# Patient Record
Sex: FEMALE | Race: BLACK OR AFRICAN AMERICAN | Employment: FULL TIME | URBAN - METROPOLITAN AREA
[De-identification: names, ages, dates, MRNs, and addresses within clinical notes are randomized per-mention and may not be internally consistent; named-entity substitution may affect disease eponyms.]

---

## 2017-01-19 DIAGNOSIS — Z30.011 ENCOUNTER FOR INITIAL PRESCRIPTION OF CONTRACEPTIVE PILLS: Primary | ICD-10-CM

## 2017-01-19 NOTE — TELEPHONE ENCOUNTER
Ortho Tri-Cycl      Last Written Prescription Date: 01/22/16  Last Fill Quantity: 28,  # refills: 11   Last Office Visit with [facility], [facility] or [facility] prescribing provider: 11/29/16

## 2017-01-20 ENCOUNTER — TELEPHONE (OUTPATIENT)
Dept: FAMILY MEDICINE | Facility: CLINIC | Age: 22
End: 2017-01-20

## 2017-01-20 RX ORDER — NORGESTIMATE AND ETHINYL ESTRADIOL 7DAYSX3 28
1 KIT ORAL DAILY
Qty: 28 TABLET | Refills: 9 | Status: SHIPPED | OUTPATIENT
Start: 2017-01-20 | End: 2017-11-29

## 2017-01-20 NOTE — TELEPHONE ENCOUNTER
Prescription approved per [facility] Refill Protocol.    Selena Lindsey, PharmD  [facility]  On behalf of [facility]

## 2017-03-08 ENCOUNTER — OFFICE VISIT (OUTPATIENT)
Dept: FAMILY MEDICINE | Facility: CLINIC | Age: 22
End: 2017-03-08
Payer: COMMERCIAL

## 2017-03-08 VITALS
DIASTOLIC BLOOD PRESSURE: 66 MMHG | SYSTOLIC BLOOD PRESSURE: 112 MMHG | WEIGHT: 161 LBS | TEMPERATURE: 98.1 F | HEART RATE: 81 BPM | BODY MASS INDEX: 29.63 KG/M2 | HEIGHT: 62 IN

## 2017-03-08 DIAGNOSIS — R07.0 THROAT PAIN: Primary | ICD-10-CM

## 2017-03-08 LAB
DEPRECATED S PYO AG THROAT QL EIA: NORMAL
HETEROPH AB SER QL: NEGATIVE
MICRO REPORT STATUS: NORMAL
SPECIMEN SOURCE: NORMAL

## 2017-03-08 PROCEDURE — 36415 COLL VENOUS BLD VENIPUNCTURE: CPT | Performed by: NURSE PRACTITIONER

## 2017-03-08 PROCEDURE — 87880 STREP A ASSAY W/OPTIC: CPT | Performed by: NURSE PRACTITIONER

## 2017-03-08 PROCEDURE — 86308 HETEROPHILE ANTIBODY SCREEN: CPT | Performed by: NURSE PRACTITIONER

## 2017-03-08 PROCEDURE — 99213 OFFICE O/P EST LOW 20 MIN: CPT | Performed by: NURSE PRACTITIONER

## 2017-03-08 PROCEDURE — 87081 CULTURE SCREEN ONLY: CPT | Performed by: NURSE PRACTITIONER

## 2017-03-08 NOTE — PATIENT INSTRUCTIONS
Gargle with salt water as needed for throat pain  Cepacol lozenges as needed for pain every 3 hrs  Tylenol 500 mg 4 times daily as needed   Rapid strep test negative     Mono test if also negative recommend to see ENT doctor     Thank you for choosing [facility].  You may be receiving a survey in the mail from [employer]aut[employer] regarding your visit today.  Please take a few minutes to complete and return the survey to let us know how we are doing.      If you have questions or concerns, please contact us via [employer] or you can contact your care team at [phone].    Our Clinic hours are:  Monday 6:40 am  to 7:00 pm  Tuesday -Friday 6:40 am to 5:00 pm    The [place] outpatient lab hours are:  Monday - Friday 6:10 am to 4:45 pm  Saturdays 7:00 am to 11:00 am  Appointments are required, call [phone]    If you have clinical questions after hours or would like to schedule an appointment,  call the clinic at [phone].

## 2017-03-08 NOTE — MR AVS SNAPSHOT
After Visit Summary   3/8/2017    Madelyn Quintnaa    MRN: [id number]           Patient Information     Date Of Birth          1995        Visit Information        Provider Department      3/8/2017 11:00 AM Ayah Goldstein APRN CNP [facility]        Today's Diagnoses     Throat pain    -  1      Care Instructions    Gargle with salt water as needed for throat pain  Cepacol lozenges as needed for pain every 3 hrs  Tylenol 500 mg 4 times daily as needed   Rapid strep test     Mono test if also negative recommend to see ENT doctor     Thank you for choosing [facility].  You may be receiving a survey in the mail from [employer] regarding your visit today.  Please take a few minutes to complete and return the survey to let us know how we are doing.      If you have questions or concerns, please contact us via [employer] or you can contact your care team at [phone].    Our Clinic hours are:  Monday 6:40 am  to 7:00 pm  Tuesday -Friday 6:40 am to 5:00 pm    The [place] outpatient lab hours are:  Monday - Friday 6:10 am to 4:45 pm  Saturdays 7:00 am to 11:00 am  Appointments are required, call [phone]    If you have clinical questions after hours or would like to schedule an appointment,  call the clinic at [phone].          Follow-ups after your visit        Who to contact     If you have questions or need follow up information about today's clinic visit or your schedule please contact [facility] directly at [phone].  Normal or non-critical lab and imaging results will be communicated to you by [employer], letter or phone within 4 business days after the clinic has received the results. If you do not hear from us within 7 days, please contact the clinic through [employer] or phone. If you have a critical or abnormal lab result, we will notify you by phone as soon as possible.  Submit refill requests through [employer] or call your pharmacy and they will  "forward the refill request to us. Please allow 3 business days for your refill to be completed.          Additional Information About Your Visit        [employer] Information     [email or web address] gives you secure access to your electronic health record. If you see a primary care provider, you can also send messages to your care team and make appointments. If you have questions, please call your primary care clinic.  If you do not have a primary care provider, please call [phone] and they will assist you.        Care EveryWhere ID     This is your Care EveryWhere ID. This could be used by other organizations to access your [place] medical records  [id number]        Your Vitals Were     Pulse Temperature Height BMI (Body Mass Index)          81 98.1  F (36.7  C) (Oral) 5' 2\" (1.575 m) 29.45 kg/m2         Blood Pressure from Last 3 Encounters:   03/08/17 112/66   11/29/16 114/56   11/26/16 105/58    Weight from Last 3 Encounters:   03/08/17 161 lb (73 kg)   11/29/16 154 lb (69.9 kg)   11/26/16 157 lb 9.6 oz (71.5 kg)              We Performed the Following     Mononucleosis screen     Rapid strep screen        Primary Care Provider Office Phone #    [facility] [phone]       No address on file        Thank you!     Thank you for choosing [facility]  for your care. Our goal is always to provide you with excellent care. Hearing back from our patients is one way we can continue to improve our services. Please take a few minutes to complete the written survey that you may receive in the mail after your visit with us. Thank you!             Your Updated Medication List - Protect others around you: Learn how to safely use, store and throw away your medicines at www.disposemymeds.org.          This list is accurate as of: 3/8/17 11:34 AM.  Always use your most recent med list.                   Brand Name Dispense Instructions for use    albuterol 108 (90 BASE) MCG/ACT Inhaler    PROAIR " HFA/PROVENTIL HFA/VENTOLIN HFA    1 Inhaler    Inhale 2 puffs into the lungs every 6 hours as needed for shortness of breath / dyspnea or wheezing       fluocinonide 0.05 % cream    LIDEX    60 g    Apply sparingly to affected area twice daily as needed.  Do not apply to face.       hydrOXYzine 50 MG capsule    VISTARIL    60 capsule    Take 1-2 capsules ( mg) at bedtime as needed for sleep.       LORazepam 1 MG tablet    ATIVAN    20 tablet    Take 1/2 - 1 tab as needed for panic up to 3 times a week PRN       norgestim-eth estrad triphasic 0.18/0.215/0.25 MG-35 MCG per tablet    ORTHO TRI-CYCLEN (28)    28 tablet    Take 1 tablet by mouth daily       sertraline 100 MG tablet    ZOLOFT    135 tablet    Take 1.5 tablets (150 mg) by mouth daily          Alert and oriented to person, place and time, memory intact, behavior appropriate to situation, PERRL.

## 2017-03-08 NOTE — PROGRESS NOTES
"  SUBJECTIVE:                                                    Madelyn Quintana is a 21 year old female who presents to clinic today for the following health issues:throat pain, bilateral ear pain and occasional dry cough for almost a month.  Denies fever, chills, shortness of breath , abdominal pain and nausea.     ENT Symptoms             Symptoms: cc Present Absent Comment   Fever/Chills   x Hasn't checked   Fatigue  x     Muscle Aches  x  Little bit   Eye Irritation       Sneezing   x    Nasal Андрей/Drg   x    Sinus Pressure/Pain   x    Loss of smell   x    Dental pain       Sore Throat x x     Swollen Glands x x     Ear Pain/Fullness  x     Cough  x     Wheeze   x    Chest Pain   x    Shortness of breath   x    Rash       Other         Symptom duration:  4 weeks   Symptom severity:  moderate   Treatments tried:  tylenol   Contacts:  unknown     Problem list and histories reviewed & adjusted, as indicated.  Additional history: as documented    Labs reviewed in EPIC    Reviewed and updated as needed this visit by clinical staff  Tobacco  Allergies  Meds       Reviewed and updated as needed this visit by Provider         ROS:  Constitutional, HEENT, cardiovascular, pulmonary, gi and gu systems are negative, except as otherwise noted.    OBJECTIVE:                                                    /66 (BP Location: Left arm, Cuff Size: Adult Regular)  Pulse 81  Temp 98.1  F (36.7  C) (Oral)  Ht 5' 2\" (1.575 m)  Wt 161 lb (73 kg)  BMI 29.45 kg/m2  Body mass index is 29.45 kg/(m^2).  GENERAL: healthy, alert and no distress  HENT: ear canals and TM's normal, oropharynx clear, oral mucous membranes moist and sinuses: not tender  NECK: no adenopathy, no asymmetry, masses, or scars and thyroid normal to palpation  RESP: lungs clear to auscultation - no rales, rhonchi or wheezes  CV: regular rate and rhythm, normal S1 S2, no S3 or S4, no murmur, click or rub, no peripheral edema and peripheral pulses " strong    Diagnostic Test Results:  Strep screen - Negative  Mono -  Negative     ASSESSMENT/PLAN:                                                      1. Throat pain    - Rapid strep screen  - Mononucleosis screen  - Beta strep group A culture  - OTOLARYNGOLOGY REFERRAL  -symptomatic therapy recommended, see AVS     See Patient Instructions    NATHANAEL Alvarez [facility]

## 2017-03-08 NOTE — NURSING NOTE
"Chief Complaint   Patient presents with     Pharyngitis     sore throat for 4 weeks, hard to swallow;      Patient Request     referral to allergy for food testing.       Initial /66 (BP Location: Left arm, Cuff Size: Adult Regular)  Pulse 81  Temp 98.1  F (36.7  C) (Oral)  Ht 5' 2\" (1.575 m)  Wt 161 lb (73 kg)  BMI 29.45 kg/m2 Estimated body mass index is 29.45 kg/(m^2) as calculated from the following:    Height as of this encounter: 5' 2\" (1.575 m).    Weight as of this encounter: 161 lb (73 kg).  Medication Reconciliation: complete  "

## 2017-03-10 LAB
BACTERIA SPEC CULT: NORMAL
MICRO REPORT STATUS: NORMAL
SPECIMEN SOURCE: NORMAL

## 2017-04-18 DIAGNOSIS — G47.9 SLEEP DISTURBANCE: ICD-10-CM

## 2017-04-18 DIAGNOSIS — F41.1 GENERALIZED ANXIETY DISORDER: ICD-10-CM

## 2017-04-18 RX ORDER — HYDROXYZINE PAMOATE 50 MG/1
CAPSULE ORAL
Qty: 60 CAPSULE | Refills: 3 | Status: SHIPPED | OUTPATIENT
Start: 2017-04-18 | End: 2018-01-22

## 2017-04-18 NOTE — TELEPHONE ENCOUNTER
Routing refill request to provider for review/approval because:  Drug not on the [facility] refill protocol for this indication (anxiety)    Selena Lindsey, PharmD  [facility]  On behalf of [facility]

## 2017-04-18 NOTE — TELEPHONE ENCOUNTER
Hydroxyzine 50 pamoate      Last Written Prescription Date: 8/4/16  Last Fill Quantity: 60,  # refills: 3   Last Office Visit with [facility], [facility] or [facility] prescribing provider: 3/8/17      Thank You!  Jammie Da Silva  [facility]  P: [phone] F:[phone]

## 2017-08-15 ENCOUNTER — OFFICE VISIT (OUTPATIENT)
Dept: FAMILY MEDICINE | Facility: CLINIC | Age: 22
End: 2017-08-15
Payer: COMMERCIAL

## 2017-08-15 VITALS
TEMPERATURE: 96.8 F | DIASTOLIC BLOOD PRESSURE: 64 MMHG | SYSTOLIC BLOOD PRESSURE: 110 MMHG | BODY MASS INDEX: 28.35 KG/M2 | RESPIRATION RATE: 18 BRPM | WEIGHT: 155 LBS | HEART RATE: 80 BPM

## 2017-08-15 DIAGNOSIS — R11.2 NON-INTRACTABLE VOMITING WITH NAUSEA, UNSPECIFIED VOMITING TYPE: ICD-10-CM

## 2017-08-15 DIAGNOSIS — R39.9 UTI SYMPTOMS: ICD-10-CM

## 2017-08-15 DIAGNOSIS — F33.0 MAJOR DEPRESSIVE DISORDER, RECURRENT EPISODE, MILD (H): ICD-10-CM

## 2017-08-15 DIAGNOSIS — N76.0 BV (BACTERIAL VAGINOSIS): ICD-10-CM

## 2017-08-15 DIAGNOSIS — Z11.3 SCREEN FOR STD (SEXUALLY TRANSMITTED DISEASE): ICD-10-CM

## 2017-08-15 DIAGNOSIS — B96.89 BV (BACTERIAL VAGINOSIS): ICD-10-CM

## 2017-08-15 DIAGNOSIS — Z01.411 ENCOUNTER FOR GYNECOLOGICAL EXAMINATION WITH ABNORMAL FINDING: Primary | ICD-10-CM

## 2017-08-15 LAB
ALBUMIN UR-MCNC: 30 MG/DL
APPEARANCE UR: CLEAR
BACTERIA #/AREA URNS HPF: ABNORMAL /HPF
BETA HCG QUAL IFA URINE: NEGATIVE
BILIRUB UR QL STRIP: NEGATIVE
COLOR UR AUTO: YELLOW
GLUCOSE UR STRIP-MCNC: NEGATIVE MG/DL
HGB UR QL STRIP: NEGATIVE
KETONES UR STRIP-MCNC: NEGATIVE MG/DL
LEUKOCYTE ESTERASE UR QL STRIP: ABNORMAL
MICRO REPORT STATUS: ABNORMAL
MUCOUS THREADS #/AREA URNS LPF: PRESENT /LPF
NITRATE UR QL: NEGATIVE
NON-SQ EPI CELLS #/AREA URNS LPF: ABNORMAL /LPF
PH UR STRIP: 6 PH (ref 5–7)
RBC #/AREA URNS AUTO: ABNORMAL /HPF (ref 0–2)
SP GR UR STRIP: >1.03 (ref 1–1.03)
SPECIMEN SOURCE: ABNORMAL
URN SPEC COLLECT METH UR: ABNORMAL
UROBILINOGEN UR STRIP-ACNC: 0.2 EU/DL (ref 0.2–1)
WBC #/AREA URNS AUTO: ABNORMAL /HPF (ref 0–2)
WET PREP SPEC: ABNORMAL

## 2017-08-15 PROCEDURE — 87491 CHLMYD TRACH DNA AMP PROBE: CPT | Performed by: NURSE PRACTITIONER

## 2017-08-15 PROCEDURE — 84703 CHORIONIC GONADOTROPIN ASSAY: CPT | Performed by: NURSE PRACTITIONER

## 2017-08-15 PROCEDURE — 87210 SMEAR WET MOUNT SALINE/INK: CPT | Performed by: NURSE PRACTITIONER

## 2017-08-15 PROCEDURE — 99213 OFFICE O/P EST LOW 20 MIN: CPT | Performed by: NURSE PRACTITIONER

## 2017-08-15 PROCEDURE — 81001 URINALYSIS AUTO W/SCOPE: CPT | Performed by: NURSE PRACTITIONER

## 2017-08-15 PROCEDURE — G0145 SCR C/V CYTO,THINLAYER,RESCR: HCPCS | Performed by: NURSE PRACTITIONER

## 2017-08-15 RX ORDER — ONDANSETRON 4 MG/1
4-8 TABLET, ORALLY DISINTEGRATING ORAL EVERY 8 HOURS PRN
Qty: 20 TABLET | Refills: 1 | Status: SHIPPED | OUTPATIENT
Start: 2017-08-15 | End: 2017-08-31 | Stop reason: ALTCHOICE

## 2017-08-15 RX ORDER — METRONIDAZOLE 500 MG/1
500 TABLET ORAL 2 TIMES DAILY
Qty: 14 TABLET | Refills: 0 | Status: SHIPPED | OUTPATIENT
Start: 2017-08-15 | End: 2017-08-31

## 2017-08-15 RX ORDER — CIPROFLOXACIN 250 MG/1
250 TABLET, FILM COATED ORAL 2 TIMES DAILY
Qty: 6 TABLET | Refills: 0 | Status: SHIPPED | OUTPATIENT
Start: 2017-08-15 | End: 2017-08-15

## 2017-08-15 ASSESSMENT — PAIN SCALES - GENERAL: PAINLEVEL: MODERATE PAIN (5)

## 2017-08-15 ASSESSMENT — PATIENT HEALTH QUESTIONNAIRE - PHQ9: SUM OF ALL RESPONSES TO PHQ QUESTIONS 1-9: 7

## 2017-08-15 NOTE — LETTER
My Depression Action Plan  Name: Madelyn Quintana   Date of Birth 1995  Date: 8/15/2017    My doctor: Honey Foley   My clinic: [address] [address] [phone]          GREEN    ZONE   Good Control    What it looks like:     Things are going generally well. You have normal up s and down s. You may even feel depressed from time to time, but bad moods usually last less than a day.   What you need to do:  1. Continue to care for yourself (see self care plan)  2. Check your depression survival kit and update it as needed  3. Follow your physician s recommendations including any medication.  4. Do not stop taking medication unless you consult with your physician first.           YELLOW         ZONE Getting Worse    What it looks like:     Depression is starting to interfere with your life.     It may be hard to get out of bed; you may be starting to isolate yourself from others.    Symptoms of depression are starting to last most all day and this has happened for several days.     You may have suicidal thoughts but they are not constant.   What you need to do:     1. Call your care team, your response to treatment will improve if you keep your care team informed of your progress. Yellow periods are signs an adjustment may need to be made.     2. Continue your self-care, even if you have to fake it!    3. Talk to someone in your support network    4. Open up your depression survival kit           RED    ZONE Medical Alert - Get Help    What it looks like:     Depression is seriously interfering with your life.     You may experience these or other symptoms: You can t get out of bed most days, can t work or engage in other necessary activities, you have trouble taking care of basic hygiene, or basic responsibilities, thoughts of suicide or death that will not go away, self-injurious behavior.     What you need to do:  1. Call your care team and  request a same-day appointment. If they are not available (weekends or after hours) call your local crisis line, emergency room or 911.      Electronically signed by: Honey Foley, August 15, 2017    Depression Self Care Plan / Survival Kit    Self-Care for Depression  Here s the deal. Your body and mind are really not as separate as most people think.  What you do and think affects how you feel and how you feel influences what you do and think. This means if you do things that people who feel good do, it will help you feel better.  Sometimes this is all it takes.  There is also a place for medication and therapy depending on how severe your depression is, so be sure to consult with your medical provider and/ or Behavioral Health Consultant if your symptoms are worsening or not improving.     In order to better manage my stress, I will:    Exercise  Get some form of exercise, every day. This will help reduce pain and release endorphins, the  feel good  chemicals in your brain. This is almost as good as taking antidepressants!  This is not the same as joining a gym and then never going! (they count on that by the way ) It can be as simple as just going for a walk or doing some gardening, anything that will get you moving.      Hygiene   Maintain good hygiene (Get out of bed in the morning, Make your bed, Brush your teeth, Take a shower, and Get dressed like you were going to work, even if you are unemployed).  If your clothes don't fit try to get ones that do.    Diet  I will strive to eat foods that are good for me, drink plenty of water, and avoid excessive sugar, caffeine, alcohol, and other mood-altering substances.  Some foods that are helpful in depression are: complex carbohydrates, B vitamins, flaxseed, fish or fish oil, fresh fruits and vegetables.    Psychotherapy  I agree to participate in Individual Therapy (if recommended).    Medication  If prescribed medications, I agree to take them.  Missing doses  can result in serious side effects.  I understand that drinking alcohol, or other illicit drug use, may cause potential side effects.  I will not stop my medication abruptly without first discussing it with my provider.    Staying Connected With Others  I will stay in touch with my friends, family members, and my primary care provider/team.    Use your imagination  Be creative.  We all have a creative side; it doesn t matter if it s oil painting, sand castles, or mud pies! This will also kick up the endorphins.    Witness Beauty  (AKA stop and smell the roses) Take a look outside, even in mid-winter. Notice colors, textures. Watch the squirrels and birds.     Service to others  Be of service to others.  There is always someone else in need.  By helping others we can  get out of ourselves  and remember the really important things.  This also provides opportunities for practicing all the other parts of the program.    Humor  Laugh and be silly!  Adjust your TV habits for less news and crime-drama and more comedy.    Control your stress  Try breathing deep, massage therapy, biofeedback, and meditation. Find time to relax each day.     My support system    Clinic Contact:  Phone number:    Contact 1:  Phone number:    Contact 2:  Phone number:    Protestant/:  Phone number:    Therapist:  Phone number:    Local crisis center:    Phone number:    Other community support:  Phone number:

## 2017-08-15 NOTE — MR AVS SNAPSHOT
After Visit Summary   8/15/2017    Madelyn Quintana    MRN: [id number]           Patient Information     Date Of Birth          1995        Visit Information        Provider Department      8/15/2017 7:20 AM Honey Foley APRN [facility]        Today's Diagnoses     UTI symptoms    -  1    Vaginal itching        Acute cystitis without hematuria        Encounter for gynecological examination with abnormal finding        Screen for STD (sexually transmitted disease)        Non-intractable vomiting with nausea, unspecified vomiting type        BV (bacterial vaginosis)          Care Instructions    Urine looks like possibly a start of a Urinary Tract Infection  - will not treat at thist donato      You also have bacterial vaginosis  - which we should treat with an antibiotics for 7 days    Will call you with urine pregnancy    Make sure you're getting a lot of fluids     Return to clinic if symptoms persist or do not resolve       Bacterial Vaginosis    You have a vaginal infection called bacterial vaginosis (BV). Both good and bad bacteria are present in a healthy vagina. BV occurs when these bacteria get out of balance. The number of bad bacteria increase. And the number of good bacteria decrease.  BV may or may not cause symptoms. If symptoms do occur, they can include:    Thin, gray, milky-white, or sometimes green discharge    Unpleasant odor or  fishy  smell    Itching, burning, or pain in or around the vagina  It is not known what causes BV, but certain factors can make the problem more likely. This can include:    Douching    Having sex with a new partner    Having sex with more than one partner  BV will sometimes go away on its own. But treatment is usually recommended. This is because untreated BV can increase the risk of more serious health problems such as:    Pelvic inflammatory disease (PID)     delivery (giving birth to a baby early if you re  pregnant)    HIV and certain other sexually transmitted diseases (STDs)    Infection after surgery on the reproductive organs  Home care  General care    BV is most often treated with medicines called antibiotics. These may be given as pills or as a vaginal cream. If antibiotics are prescribed, be sure to use them exactly as directed. Also, be sure to complete all of the medicine, even if your symptoms go away.    Avoid douching or having sex during treatment.    If you have sex with a female partner, ask your healthcare provider if she should also be treated.  Prevention    Limit or avoid douching.    Avoid having sex. If you do have sex, then take steps to lower your risk:    Use condoms when having sex.    Limit the number of partners you have sex with.  Follow-up care  Follow up with your healthcare provider, or as advised.  When to seek medical advice  Call your healthcare provider right away if:    You have a fever of 100.4 F (38 C) or higher, or as directed by your provider.    Your symptoms worsen, or they don t go away within a few days of starting treatment.    You have new pain in the lower belly or pelvic region.    You have side effects that bother you or a reaction to the pills or cream you re prescribed.    You or any partners you have sex with have new symptoms, such as a rash, joint pain, or sores.  Date Last Reviewed: 7/30/2015 2000-2017 [employer]. [address] [place] [address], [place] [address]. All rights reserved. This information is not intended as a substitute for professional medical care. Always follow your healthcare professional's instructions.                Follow-ups after your visit        Who to contact     If you have questions or need follow up information about today's clinic visit or your schedule please contact [facility] directly at [phone].  Normal or non-critical lab and imaging results will be communicated to you by MyChart, letter or  phone within 4 business days after the clinic has received the results. If you do not hear from us within 7 days, please contact the clinic through [employer] or phone. If you have a critical or abnormal lab result, we will notify you by phone as soon as possible.  Submit refill requests through [employer] or call your pharmacy and they will forward the refill request to us. Please allow 3 business days for your refill to be completed.          Additional Information About Your Visit        [employer] Information     [employer] gives you secure access to your electronic health record. If you see a primary care provider, you can also send messages to your care team and make appointments. If you have questions, please call your primary care clinic.  If you do not have a primary care provider, please call [phone] and they will assist you.        Care EveryWhere ID     This is your Care EveryWhere ID. This could be used by other organizations to access your [facility] medical records  [id number]        Your Vitals Were     Pulse Temperature Respirations BMI (Body Mass Index)          80 96.8  F (36  C) (Tympanic) 18 28.35 kg/m2         Blood Pressure from Last 3 Encounters:   08/15/17 110/64   03/08/17 112/66   11/29/16 114/56    Weight from Last 3 Encounters:   08/15/17 155 lb (70.3 kg)   03/08/17 161 lb (73 kg)   11/29/16 154 lb (69.9 kg)              We Performed the Following     *UA reflex to Microscopic and Culture ([place] and [facility] (except [place] and [place])     Beta HCG qual IFA urine     Chlamydia trachomatis PCR     Pap imaged thin layer screen only - recommended age 21 - 24 years     Urine Microscopic     Wet prep          Today's Medication Changes          These changes are accurate as of: 8/15/17  8:16 AM.  If you have any questions, ask your nurse or doctor.               Start taking these medicines.        Dose/Directions    metroNIDAZOLE 500 MG tablet   Commonly known as:  FLAGYL   Used for:   BV (bacterial vaginosis)   Started by:  Honey Foley APRN CNP        Dose:  500 mg   Take 1 tablet (500 mg) by mouth 2 times daily   Quantity:  14 tablet   Refills:  0       ondansetron 4 MG ODT tab   Commonly known as:  ZOFRAN ODT   Used for:  Non-intractable vomiting with nausea, unspecified vomiting type   Started by:  Honey Foley APRN CNP        Dose:  4-8 mg   Take 1-2 tablets (4-8 mg) by mouth every 8 hours as needed for nausea   Quantity:  20 tablet   Refills:  1            Where to get your medicines      These medications were sent to [facility] [place] [address]     Phone:  [phone]     metroNIDAZOLE 500 MG tablet    ondansetron 4 MG ODT tab                Primary Care Provider Office Phone # Fax #    NATHANAEL Medeiros [phone]       [place] [id number]        Equal Access to Services     JAY SALAZAR : Hadii caitlin ku hadasho Soomaali, waaxda luqadaha, qaybta kaalmada adeegyada, waxay orlandoin haykole aburto. So [facility] [phone].    ATENCIÓN: Si habla español, tiene a abdullahi disposición servicios gratuitos de asistencia lingüística. Llame al [phone].    We comply with applicable federal civil rights laws and [place] laws. We do not discriminate on the basis of race, color, national origin, age, disability sex, sexual orientation or gender identity.            Thank you!     Thank you for choosing [facility]  for your care. Our goal is always to provide you with excellent care. Hearing back from our patients is one way we can continue to improve our services. Please take a few minutes to complete the written survey that you may receive in the mail after your visit with us. Thank you!             Your Updated Medication List - Protect others around you: Learn how to safely use, store and throw away your  medicines at www.disposemymeds.org.          This list is accurate as of: 8/15/17  8:16 AM.  Always use your most recent med list.                   Brand Name Dispense Instructions for use Diagnosis    albuterol 108 (90 BASE) MCG/ACT Inhaler    PROAIR HFA/PROVENTIL HFA/VENTOLIN HFA    1 Inhaler    Inhale 2 puffs into the lungs every 6 hours as needed for shortness of breath / dyspnea or wheezing    Acute bronchitis with symptoms > 10 days       fluocinonide 0.05 % cream    LIDEX    60 g    Apply sparingly to affected area twice daily as needed.  Do not apply to face.    Rash       hydrOXYzine 50 MG capsule    VISTARIL    60 capsule    Take 1-2 capsules ( mg) at bedtime as needed for sleep.    Sleep disturbance, Generalized anxiety disorder       LORazepam 1 MG tablet    ATIVAN    20 tablet    Take 1/2 - 1 tab as needed for panic up to 3 times a week PRN    Panic disorder with agoraphobia       metroNIDAZOLE 500 MG tablet    FLAGYL    14 tablet    Take 1 tablet (500 mg) by mouth 2 times daily    BV (bacterial vaginosis)       norgestim-eth estrad triphasic 0.18/0.215/0.25 MG-35 MCG per tablet    ORTHO TRI-CYCLEN (28)    28 tablet    Take 1 tablet by mouth daily    Encounter for initial prescription of contraceptive pills       ondansetron 4 MG ODT tab    ZOFRAN ODT    20 tablet    Take 1-2 tablets (4-8 mg) by mouth every 8 hours as needed for nausea    Non-intractable vomiting with nausea, unspecified vomiting type       sertraline 100 MG tablet    ZOLOFT    135 tablet    Take 1.5 tablets (150 mg) by mouth daily    Generalized anxiety disorder, Panic disorder with agoraphobia

## 2017-08-15 NOTE — PATIENT INSTRUCTIONS
Urine looks like possibly a start of a Urinary Tract Infection  - will not treat at thist donato      You also have bacterial vaginosis  - which we should treat with an antibiotics for 7 days    Will call you with urine pregnancy    Make sure you're getting a lot of fluids     Return to clinic if symptoms persist or do not resolve       Bacterial Vaginosis    You have a vaginal infection called bacterial vaginosis (BV). Both good and bad bacteria are present in a healthy vagina. BV occurs when these bacteria get out of balance. The number of bad bacteria increase. And the number of good bacteria decrease.  BV may or may not cause symptoms. If symptoms do occur, they can include:    Thin, gray, milky-white, or sometimes green discharge    Unpleasant odor or  fishy  smell    Itching, burning, or pain in or around the vagina  It is not known what causes BV, but certain factors can make the problem more likely. This can include:    Douching    Having sex with a new partner    Having sex with more than one partner  BV will sometimes go away on its own. But treatment is usually recommended. This is because untreated BV can increase the risk of more serious health problems such as:    Pelvic inflammatory disease (PID)     delivery (giving birth to a baby early if you re pregnant)    HIV and certain other sexually transmitted diseases (STDs)    Infection after surgery on the reproductive organs  Home care  General care    BV is most often treated with medicines called antibiotics. These may be given as pills or as a vaginal cream. If antibiotics are prescribed, be sure to use them exactly as directed. Also, be sure to complete all of the medicine, even if your symptoms go away.    Avoid douching or having sex during treatment.    If you have sex with a female partner, ask your healthcare provider if she should also be treated.  Prevention    Limit or avoid douching.    Avoid having sex. If you do have sex, then take  steps to lower your risk:    Use condoms when having sex.    Limit the number of partners you have sex with.  Follow-up care  Follow up with your healthcare provider, or as advised.  When to seek medical advice  Call your healthcare provider right away if:    You have a fever of 100.4 F (38 C) or higher, or as directed by your provider.    Your symptoms worsen, or they don t go away within a few days of starting treatment.    You have new pain in the lower belly or pelvic region.    You have side effects that bother you or a reaction to the pills or cream you re prescribed.    You or any partners you have sex with have new symptoms, such as a rash, joint pain, or sores.  Date Last Reviewed: 7/30/2015 2000-2017 [employer]. [address] [place] [address], [place] [address]. All rights reserved. This information is not intended as a substitute for professional medical care. Always follow your healthcare professional's instructions.

## 2017-08-15 NOTE — NURSING NOTE
"Chief Complaint   Patient presents with     UTI       Initial /64 (BP Location: Right arm, Patient Position: Chair, Cuff Size: Adult Regular)  Pulse 80  Temp 96.8  F (36  C) (Tympanic)  Resp 18  Wt 155 lb (70.3 kg)  BMI 28.35 kg/m2 Estimated body mass index is 28.35 kg/(m^2) as calculated from the following:    Height as of 3/8/17: 5' 2\" (1.575 m).    Weight as of this encounter: 155 lb (70.3 kg).  Medication Reconciliation: complete    Health Maintenance that is potentially due pending provider review:  Pap Smear - pt informed    Tory Silveira MA  7:43 AM 8/15/2017  .        "

## 2017-08-15 NOTE — PROGRESS NOTES
SUBJECTIVE:                                                    Madelyn Quintana is a 22 year old female who presents to clinic today for the following health issues:      URINARY TRACT SYMPTOMS  Onset: x 1 week    Description:   Painful urination (Dysuria): YES  Blood in urine (Hematuria): no   Delay in urine (Hesitency): no     Intensity: severe    Progression of Symptoms:  worsening    Accompanying Signs & Symptoms:  Fever/chills: no   Flank pain YES- lower back pain  Nausea and vomiting: YES - vomiting   Any vaginal symptoms: vaginal discharge  Abdominal/Pelvic Pain: YES- abdominal    History:   History of frequent UTI's: no   History of kidney stones: no   Sexually Active: YES  Possibility of pregnancy: No    Precipitating factors:   NA    Therapies Tried and outcome: none    Mild vaginal itching   Has had Urinary Tract Infection's in the past also bacterial vaginosis   A lot of pelvic discomfort        Depression     Doing well on medication, no significant side effects         Problem list and histories reviewed & adjusted, as indicated.  Additional history: as documented    Patient Active Problem List   Diagnosis     Labial infection     CARDIOVASCULAR SCREENING; LDL GOAL LESS THAN 160     Generalized anxiety disorder     Major depressive disorder, recurrent episode, mild (H)     Panic disorder with agoraphobia     Vitamin D deficiency     History reviewed. No pertinent surgical history.    Social History   Substance Use Topics     Smoking status: Passive Smoke Exposure - Never Smoker     Smokeless tobacco: Never Used      Comment: smoking in house     Alcohol use Yes      Comment: during past week.     Family History   Problem Relation Age of Onset     Unknown/Adopted Maternal Grandfather      Unknown/Adopted Paternal Grandmother      Unknown/Adopted Paternal Grandfather          Current Outpatient Prescriptions   Medication Sig Dispense Refill     metroNIDAZOLE (FLAGYL) 500 MG tablet Take 1 tablet (500 mg)  by mouth 2 times daily 14 tablet 0     ondansetron (ZOFRAN ODT) 4 MG ODT tab Take 1-2 tablets (4-8 mg) by mouth every 8 hours as needed for nausea 20 tablet 1     hydrOXYzine (VISTARIL) 50 MG capsule Take 1-2 capsules ( mg) at bedtime as needed for sleep. 60 capsule 3     norgestim-eth estrad triphasic (ORTHO TRI-CYCLEN, 28,) 0.18/0.215/0.25 MG-35 MCG per tablet Take 1 tablet by mouth daily 28 tablet 9     albuterol (PROAIR HFA, PROVENTIL HFA, VENTOLIN HFA) 108 (90 BASE) MCG/ACT inhaler Inhale 2 puffs into the lungs every 6 hours as needed for shortness of breath / dyspnea or wheezing 1 Inhaler 3     fluocinonide (LIDEX) 0.05 % cream Apply sparingly to affected area twice daily as needed.  Do not apply to face. 60 g 1     sertraline (ZOLOFT) 100 MG tablet Take 1.5 tablets (150 mg) by mouth daily 135 tablet 3     LORazepam (ATIVAN) 1 MG tablet Take 1/2 - 1 tab as needed for panic up to 3 times a week PRN 20 tablet 3     Allergies   Allergen Reactions     Lactose Diarrhea     Intolerance         Reviewed and updated as needed this visit by clinical staffTobacco  Allergies  Meds  Problems  Med Hx  Surg Hx  Fam Hx  Soc Hx        Reviewed and updated as needed this visit by Provider  Allergies  Meds  Problems         ROS:  Constitutional, HEENT, cardiovascular, pulmonary, gi and gu systems are negative, except as otherwise noted.      OBJECTIVE:   /64 (BP Location: Right arm, Patient Position: Chair, Cuff Size: Adult Regular)  Pulse 80  Temp 96.8  F (36  C) (Tympanic)  Resp 18  Wt 155 lb (70.3 kg)  BMI 28.35 kg/m2  Body mass index is 28.35 kg/(m^2).  GENERAL APPEARANCE: healthy, alert and no distress  RESP: lungs clear to auscultation - no rales, rhonchi or wheezes  CV: regular rates and rhythm, normal S1 S2, no S3 or S4 and no murmur, click or rub  ABDOMEN: soft, mild tenderness of lower quadrants, without hepatosplenomegaly or masses and bowel sounds normal   (female): normal cervix,  adnexae, and uterus without masses and small amount of white, creamy discharge  MS: extremities normal- no gross deformities noted  NEURO: Normal strength and tone, mentation intact and speech normal  PSYCH: mentation appears normal and affect normal/bright    Diagnostic Test Results:  Results for orders placed or performed in visit on 08/15/17 (from the past 24 hour(s))   *UA reflex to Microscopic and Culture ([place] and [facility] (except [place] and [place])   Result Value Ref Range    Color Urine Yellow     Appearance Urine Clear     Glucose Urine Negative NEG mg/dL    Bilirubin Urine Negative NEG    Ketones Urine Negative NEG mg/dL    Specific Gravity Urine >1.030 1.003 - 1.035    Blood Urine Negative NEG    pH Urine 6.0 5.0 - 7.0 pH    Protein Albumin Urine 30 (A) NEG mg/dL    Urobilinogen Urine 0.2 0.2 - 1.0 EU/dL    Nitrite Urine Negative NEG    Leukocyte Esterase Urine Trace (A) NEG    Source Midstream Urine    Urine Microscopic   Result Value Ref Range    WBC Urine 5-10 (A) 0 - 2 /HPF    RBC Urine O - 2 0 - 2 /HPF    Squamous Epithelial /LPF Urine Few FEW /LPF    Bacteria Urine Few (A) NEG /HPF    Mucous Urine Present (A) NEG /LPF   Beta HCG qual IFA urine   Result Value Ref Range    Beta HCG Qual IFA Urine Negative NEG   Wet prep   Result Value Ref Range    Specimen Description Vagina     Wet Prep (A)      No yeast seen  Clue cells seen  No Trichomonas seen      Micro Report Status FINAL 08/15/2017        ASSESSMENT/PLAN:     1. Encounter for gynecological examination with abnormal finding    - Pap imaged thin layer screen only - recommended age 21 - 24 years    2. Major depressive disorder, recurrent episode, mild (H)  Doing well, no new concerns. No change in treatment     3. BV (bacterial vaginosis)    - Wet prep  - metroNIDAZOLE (FLAGYL) 500 MG tablet; Take 1 tablet (500 mg) by mouth 2 times daily  Dispense: 14 tablet; Refill: 0    4. UTI symptoms  Pelvic pressure and burning on urination. UA  negative for Urinary Tract Infection, will monitor symptoms and have patient return if symptoms persist   - *UA reflex to Microscopic and Culture ([place] and [facility] (except [place] and [place])  - Urine Microscopic    5. Non-intractable vomiting with nausea, unspecified vomiting type  Nausea with vomiting yesterday, still some nausea today. Urine pregnancy negative. Also has some diarrhea yesterday. Could possibly be gastritis. Will monitor symptoms.   - Beta HCG qual IFA urine  - ondansetron (ZOFRAN ODT) 4 MG ODT tab; Take 1-2 tablets (4-8 mg) by mouth every 8 hours as needed for nausea  Dispense: 20 tablet; Refill: 1    6. Screen for STD (sexually transmitted disease)    - Chlamydia trachomatis PCR    Patient Instructions   Urine looks like possibly a start of a Urinary Tract Infection  - will not treat at thist donato      You also have bacterial vaginosis  - which we should treat with an antibiotics for 7 days    Will call you with urine pregnancy    Make sure you're getting a lot of fluids     Return to clinic if symptoms persist or do not resolve       Bacterial Vaginosis    You have a vaginal infection called bacterial vaginosis (BV). Both good and bad bacteria are present in a healthy vagina. BV occurs when these bacteria get out of balance. The number of bad bacteria increase. And the number of good bacteria decrease.  BV may or may not cause symptoms. If symptoms do occur, they can include:    Thin, gray, milky-white, or sometimes green discharge    Unpleasant odor or  fishy  smell    Itching, burning, or pain in or around the vagina  It is not known what causes BV, but certain factors can make the problem more likely. This can include:    Douching    Having sex with a new partner    Having sex with more than one partner  BV will sometimes go away on its own. But treatment is usually recommended. This is because untreated BV can increase the risk of more serious health problems such as:    Pelvic  inflammatory disease (PID)     delivery (giving birth to a baby early if you re pregnant)    HIV and certain other sexually transmitted diseases (STDs)    Infection after surgery on the reproductive organs  Home care  General care    BV is most often treated with medicines called antibiotics. These may be given as pills or as a vaginal cream. If antibiotics are prescribed, be sure to use them exactly as directed. Also, be sure to complete all of the medicine, even if your symptoms go away.    Avoid douching or having sex during treatment.    If you have sex with a female partner, ask your healthcare provider if she should also be treated.  Prevention    Limit or avoid douching.    Avoid having sex. If you do have sex, then take steps to lower your risk:    Use condoms when having sex.    Limit the number of partners you have sex with.  Follow-up care  Follow up with your healthcare provider, or as advised.  When to seek medical advice  Call your healthcare provider right away if:    You have a fever of 100.4 F (38 C) or higher, or as directed by your provider.    Your symptoms worsen, or they don t go away within a few days of starting treatment.    You have new pain in the lower belly or pelvic region.    You have side effects that bother you or a reaction to the pills or cream you re prescribed.    You or any partners you have sex with have new symptoms, such as a rash, joint pain, or sores.  Date Last Reviewed: 2015-2017 [employer]. [address] [place] [address]. All rights reserved. This information is not intended as a substitute for professional medical care. Always follow your healthcare professional's instructions.            NATHANAEL Shaikh [facility]

## 2017-08-16 ENCOUNTER — TELEPHONE (OUTPATIENT)
Dept: FAMILY MEDICINE | Facility: CLINIC | Age: 22
End: 2017-08-16

## 2017-08-16 DIAGNOSIS — A74.9 CHLAMYDIA INFECTION: Primary | ICD-10-CM

## 2017-08-16 LAB
C TRACH DNA SPEC QL NAA+PROBE: POSITIVE
SPECIMEN SOURCE: ABNORMAL

## 2017-08-16 RX ORDER — AZITHROMYCIN 500 MG/1
1000 TABLET, FILM COATED ORAL ONCE
Qty: 2 TABLET | Refills: 0 | Status: SHIPPED | OUTPATIENT
Start: 2017-08-16 | End: 2017-08-16

## 2017-08-17 LAB
COPATH REPORT: NORMAL
PAP: NORMAL

## 2017-08-30 ENCOUNTER — TELEPHONE (OUTPATIENT)
Dept: FAMILY MEDICINE | Facility: CLINIC | Age: 22
End: 2017-08-30

## 2017-08-30 NOTE — TELEPHONE ENCOUNTER
Voice message was left.12;21 PM  Reason for Call:  Other     Detailed comments: Madelyn says she was seen earlier this week with nausea and stomach pains. She says she is having these issues again and called into work last night. She is wondering if she can get a doctor's note or if she has to be seen again    Phone Number Patient can be reached at: Home number on file [phone] (home)    Best Time: anytime    Can we leave a detailed message on this number? Did not say on her voice message.     Call taken on 8/30/2017 at 1:05 PM by Kanwal Mccoy

## 2017-08-30 NOTE — TELEPHONE ENCOUNTER
Would have patient be seen again. No note, this is 2 weeks ago. She may need another urine spec.    Thanks  NATHANAEL Khan CNP

## 2017-08-31 ENCOUNTER — OFFICE VISIT (OUTPATIENT)
Dept: FAMILY MEDICINE | Facility: CLINIC | Age: 22
End: 2017-08-31
Payer: COMMERCIAL

## 2017-08-31 VITALS
TEMPERATURE: 96.9 F | HEART RATE: 72 BPM | SYSTOLIC BLOOD PRESSURE: 104 MMHG | DIASTOLIC BLOOD PRESSURE: 60 MMHG | BODY MASS INDEX: 28.61 KG/M2 | WEIGHT: 156.4 LBS

## 2017-08-31 DIAGNOSIS — R11.0 NAUSEA: Primary | ICD-10-CM

## 2017-08-31 PROCEDURE — 99213 OFFICE O/P EST LOW 20 MIN: CPT | Performed by: PHYSICIAN ASSISTANT

## 2017-08-31 RX ORDER — PROMETHAZINE HYDROCHLORIDE 25 MG/1
25 TABLET ORAL EVERY 6 HOURS PRN
Qty: 20 TABLET | Refills: 1 | Status: SHIPPED | OUTPATIENT
Start: 2017-08-31 | End: 2018-03-14

## 2017-08-31 ASSESSMENT — ENCOUNTER SYMPTOMS
DIARRHEA: 1
NECK PAIN: 0
DOUBLE VISION: 0
BLURRED VISION: 0
SEIZURES: 0
TINGLING: 0
DIZZINESS: 0
HEADACHES: 0
HEMOPTYSIS: 0
PHOTOPHOBIA: 0
SENSORY CHANGE: 0
HALLUCINATIONS: 0
FEVER: 0
EYE DISCHARGE: 0
COUGH: 0
DYSURIA: 0
SPUTUM PRODUCTION: 0
EYE REDNESS: 0
WHEEZING: 0
SHORTNESS OF BREATH: 0
CONSTIPATION: 0
ABDOMINAL PAIN: 0
VOMITING: 1
EYE PAIN: 0
NEUROLOGICAL NEGATIVE: 1
BLOOD IN STOOL: 0
SORE THROAT: 0
NERVOUS/ANXIOUS: 0
LOSS OF CONSCIOUSNESS: 0
FOCAL WEAKNESS: 0
HEARTBURN: 0
MYALGIAS: 0
INSOMNIA: 0
PALPITATIONS: 0
DEPRESSION: 0
WEIGHT LOSS: 0
WEAKNESS: 0
FREQUENCY: 0
NAUSEA: 1
ORTHOPNEA: 0
DIAPHORESIS: 0
BACK PAIN: 0

## 2017-08-31 ASSESSMENT — LIFESTYLE VARIABLES: SUBSTANCE_ABUSE: 0

## 2017-08-31 NOTE — PROGRESS NOTES
HPI    SUBJECTIVE:   Madelyn Quintana is a 22 year old female who presents to clinic today for nausea, vomiting and diarrhea for the past 2 days. She's had no fever associated with this. She states that if she eats she has nausea. She denies any other problems. She states a couple of weeks ago she was seen for bacterial vaginosis and was treated with metronidazole but was not having problems until now.  She needs a work note because she has missed work for a couple of days  Diarrhea      Duration: 2 Days    Description:       Consistency of stool: loose and mucousy       Blood in stool: no        Number of loose stools past 24 hours: 5    Intensity:  moderate    Accompanying signs and symptoms:       Fever: no        Nausea/vomitting: YES       Abdominal pain: YES- right before needing to go to the bathroom and vomiting        Weight loss: no     History (recent antibiotics or travel/ill contacts/med changes/testing done): Antibiotics about 2 weeks ago     Precipitating or alleviating factors: None    Therapies tried and outcome: none    Problem list and histories reviewed & adjusted, as indicated.  Additional history: as documented    Patient Active Problem List   Diagnosis     Labial infection     CARDIOVASCULAR SCREENING; LDL GOAL LESS THAN 160     Generalized anxiety disorder     Major depressive disorder, recurrent episode, mild (H)     Panic disorder with agoraphobia     Vitamin D deficiency     History reviewed. No pertinent surgical history.    Social History   Substance Use Topics     Smoking status: Passive Smoke Exposure - Never Smoker     Smokeless tobacco: Never Used      Comment: smoking in house     Alcohol use Yes      Comment: during past week.     Family History   Problem Relation Age of Onset     Unknown/Adopted Paternal Grandmother      Unknown/Adopted Paternal Grandfather      Unknown/Adopted Maternal Grandfather          Current Outpatient Prescriptions   Medication Sig Dispense Refill      promethazine (PHENERGAN) 25 MG tablet Take 1 tablet (25 mg) by mouth every 6 hours as needed for nausea 20 tablet 1     hydrOXYzine (VISTARIL) 50 MG capsule Take 1-2 capsules ( mg) at bedtime as needed for sleep. 60 capsule 3     norgestim-eth estrad triphasic (ORTHO TRI-CYCLEN, 28,) 0.18/0.215/0.25 MG-35 MCG per tablet Take 1 tablet by mouth daily 28 tablet 9     albuterol (PROAIR HFA, PROVENTIL HFA, VENTOLIN HFA) 108 (90 BASE) MCG/ACT inhaler Inhale 2 puffs into the lungs every 6 hours as needed for shortness of breath / dyspnea or wheezing 1 Inhaler 3     fluocinonide (LIDEX) 0.05 % cream Apply sparingly to affected area twice daily as needed.  Do not apply to face. 60 g 1     sertraline (ZOLOFT) 100 MG tablet Take 1.5 tablets (150 mg) by mouth daily 135 tablet 3     LORazepam (ATIVAN) 1 MG tablet Take 1/2 - 1 tab as needed for panic up to 3 times a week PRN 20 tablet 3     Allergies   Allergen Reactions     Lactose Diarrhea     Intolerance     Labs reviewed in EPIC      Reviewed and updated as needed this visit by clinical staff     Reviewed and updated as needed this visit by Provider       Review of Systems   Constitutional: Negative for diaphoresis, fever, malaise/fatigue and weight loss.   HENT: Negative for congestion, ear discharge, ear pain, hearing loss, nosebleeds and sore throat.    Eyes: Negative for blurred vision, double vision, photophobia, pain, discharge and redness.   Respiratory: Negative for cough, hemoptysis, sputum production, shortness of breath and wheezing.    Cardiovascular: Negative for chest pain, palpitations, orthopnea and leg swelling.   Gastrointestinal: Positive for diarrhea, nausea and vomiting. Negative for abdominal pain, blood in stool, constipation, heartburn and melena.   Genitourinary: Negative.  Negative for dysuria, frequency and urgency.   Musculoskeletal: Negative for back pain, joint pain, myalgias and neck pain.   Skin: Negative for itching and rash.    Neurological: Negative.  Negative for dizziness, tingling, sensory change, focal weakness, seizures, loss of consciousness, weakness and headaches.   Endo/Heme/Allergies: Negative.    Psychiatric/Behavioral: Negative for depression, hallucinations, substance abuse and suicidal ideas. The patient is not nervous/anxious and does not have insomnia.          Physical Exam   Constitutional: She is oriented to person, place, and time and well-developed, well-nourished, and in no distress. No distress.   HENT:   Head: Normocephalic and atraumatic.   Right Ear: External ear normal.   Left Ear: External ear normal.   Nose: Nose normal.   Eyes: Conjunctivae and EOM are normal. Pupils are equal, round, and reactive to light. Right eye exhibits no discharge. Left eye exhibits no discharge. No scleral icterus.   Neck: Normal range of motion. Neck supple. No JVD present. No tracheal deviation present. No thyromegaly present.   Cardiovascular: Normal rate, regular rhythm, normal heart sounds and intact distal pulses.  Exam reveals no gallop and no friction rub.    No murmur heard.  Pulmonary/Chest: Effort normal and breath sounds normal. No stridor. No respiratory distress. She has no wheezes. She has no rales. She exhibits no tenderness.   Abdominal: Soft. Bowel sounds are normal. She exhibits no distension and no mass. There is no tenderness. There is no rebound and no guarding.   Musculoskeletal: Normal range of motion. She exhibits no edema or tenderness.   Lymphadenopathy:     She has no cervical adenopathy.   Neurological: She is alert and oriented to person, place, and time. She has normal reflexes. No cranial nerve deficit. She exhibits normal muscle tone. Gait normal.   Skin: Skin is warm and dry. No rash noted. She is not diaphoretic. No erythema. No pallor.   Psychiatric: Mood, memory, affect and judgment normal.         (R11.0) Nausea  (primary encounter diagnosis)  Comment:   Plan: promethazine (PHENERGAN) 25 MG  tablet           Symptomatic measures including clear liquids for 24 hours and then brat diet after that. A trial of promethazine was recommended. Follow-up if fever, increasing diarrhea or vomiting or abdominal pain

## 2017-08-31 NOTE — NURSING NOTE
"Chief Complaint   Patient presents with     Diarrhea     Nausea       Initial /60 (BP Location: Right arm, Patient Position: Chair, Cuff Size: Adult Regular)  Pulse 72  Temp 96.9  F (36.1  C) (Tympanic)  Wt 156 lb 6.4 oz (70.9 kg)  BMI 28.61 kg/m2 Estimated body mass index is 28.61 kg/(m^2) as calculated from the following:    Height as of 3/8/17: 5' 2\" (1.575 m).    Weight as of this encounter: 156 lb 6.4 oz (70.9 kg).  Medication Reconciliation: complete    Health Maintenance that is potentially due pending provider review:  NONE    n/a    Is there anyone who you would like to be able to receive your results? Yes  If yes have patient fill out LORETTA FRIEDMAN CMA    "

## 2017-08-31 NOTE — LETTER
[facility]  [address] [place] [address]  Phone: [phone]  Fax: [phone]    August 31, 2017        Madelyn Quintana  [address] [place] [address]          To whom it may concern:    RE: Madelyn Quintana    Patient was seen and treated today at our clinic and missed work 8/29 - 8/31/17 due to illness.    Please contact me for questions or concerns.      Sincerely,        Austyn Uriarte PA-C

## 2017-08-31 NOTE — MR AVS SNAPSHOT
After Visit Summary   8/31/2017    Madelyn Quintana    MRN: [id number]           Patient Information     Date Of Birth          1995        Visit Information        Provider Department      8/31/2017 10:20 AM Austyn Uriarte PA-C [facility]        Today's Diagnoses     Nausea    -  1       Follow-ups after your visit        Follow-up notes from your care team     Return if symptoms worsen or fail to improve.      Who to contact     If you have questions or need follow up information about today's clinic visit or your schedule please contact [facility] directly at [phone].  Normal or non-critical lab and imaging results will be communicated to you by MyChart, letter or phone within 4 business days after the clinic has received the results. If you do not hear from us within 7 days, please contact the clinic through [employer] or phone. If you have a critical or abnormal lab result, we will notify you by phone as soon as possible.  Submit refill requests through [employer] or call your pharmacy and they will forward the refill request to us. Please allow 3 business days for your refill to be completed.          Additional Information About Your Visit        MyChart Information     [employer] gives you secure access to your electronic health record. If you see a primary care provider, you can also send messages to your care team and make appointments. If you have questions, please call your primary care clinic.  If you do not have a primary care provider, please call [phone] and they will assist you.        Care EveryWhere ID     This is your Care EveryWhere ID. This could be used by other organizations to access your [facility] medical records  [id number]        Your Vitals Were     Pulse Temperature BMI (Body Mass Index)             72 96.9  F (36.1  C) (Tympanic) 28.61 kg/m2          Blood Pressure from Last 3 Encounters:   08/31/17 104/60   08/15/17 110/64    03/08/17 112/66    Weight from Last 3 Encounters:   08/31/17 156 lb 6.4 oz (70.9 kg)   08/15/17 155 lb (70.3 kg)   03/08/17 161 lb (73 kg)              Today, you had the following     No orders found for display         Today's Medication Changes          These changes are accurate as of: 8/31/17 11:28 AM.  If you have any questions, ask your nurse or doctor.               Start taking these medicines.        Dose/Directions    promethazine 25 MG tablet   Commonly known as:  PHENERGAN   Used for:  Nausea   Started by:  Austyn Uriarte PA-C        Dose:  25 mg   Take 1 tablet (25 mg) by mouth every 6 hours as needed for nausea   Quantity:  20 tablet   Refills:  1         Stop taking these medicines if you haven't already. Please contact your care team if you have questions.     ondansetron 4 MG ODT tab   Commonly known as:  ZOFRAN ODT   Stopped by:  Austyn Uriarte PA-C                Where to get your medicines      These medications were sent to [facility] [place] [address]     Phone:  [phone]     promethazine 25 MG tablet                Primary Care Provider Office Phone # Fax #    Honey HernándezNATHANAEL [facility] [phone]       [place] [address]        Equal Access to Services     JAY SALAZAR AH: Asha altamirano Soeri, waaxda luluisadaha, qaybta kaalmada katelyn, kristi aburto. So [facility] [phone].    ATENCIÓN: Si habla español, tiene a abdullahi disposición servicios gratuitos de asistencia lingüística. Mary al [phone].    We comply with applicable federal civil rights laws and [place] laws. We do not discriminate on the basis of race, color, national origin, age, disability sex, sexual orientation or gender identity.            Thank you!     Thank you for choosing [facility]  for your care. Our goal is  always to provide you with excellent care. Hearing back from our patients is one way we can continue to improve our services. Please take a few minutes to complete the written survey that you may receive in the mail after your visit with us. Thank you!             Your Updated Medication List - Protect others around you: Learn how to safely use, store and throw away your medicines at www.disposemymeds.org.          This list is accurate as of: 8/31/17 11:28 AM.  Always use your most recent med list.                   Brand Name Dispense Instructions for use Diagnosis    albuterol 108 (90 BASE) MCG/ACT Inhaler    PROAIR HFA/PROVENTIL HFA/VENTOLIN HFA    1 Inhaler    Inhale 2 puffs into the lungs every 6 hours as needed for shortness of breath / dyspnea or wheezing    Acute bronchitis with symptoms > 10 days       fluocinonide 0.05 % cream    LIDEX    60 g    Apply sparingly to affected area twice daily as needed.  Do not apply to face.    Rash       hydrOXYzine 50 MG capsule    VISTARIL    60 capsule    Take 1-2 capsules ( mg) at bedtime as needed for sleep.    Sleep disturbance, Generalized anxiety disorder       LORazepam 1 MG tablet    ATIVAN    20 tablet    Take 1/2 - 1 tab as needed for panic up to 3 times a week PRN    Panic disorder with agoraphobia       norgestim-eth estrad triphasic 0.18/0.215/0.25 MG-35 MCG per tablet    ORTHO TRI-CYCLEN (28)    28 tablet    Take 1 tablet by mouth daily    Encounter for initial prescription of contraceptive pills       promethazine 25 MG tablet    PHENERGAN    20 tablet    Take 1 tablet (25 mg) by mouth every 6 hours as needed for nausea    Nausea       sertraline 100 MG tablet    ZOLOFT    135 tablet    Take 1.5 tablets (150 mg) by mouth daily    Generalized anxiety disorder, Panic disorder with agoraphobia

## 2017-10-30 DIAGNOSIS — F40.01 PANIC DISORDER WITH AGORAPHOBIA: ICD-10-CM

## 2017-10-31 RX ORDER — LORAZEPAM 1 MG/1
TABLET ORAL
Qty: 20 TABLET | Refills: 3 | Status: SHIPPED | OUTPATIENT
Start: 2017-10-31 | End: 2018-07-18

## 2017-10-31 NOTE — TELEPHONE ENCOUNTER
Routing refill request to provider for review/approval because:  Drug not on the [facility] refill protocol     Analia Montes RN

## 2017-11-29 DIAGNOSIS — Z30.011 ENCOUNTER FOR INITIAL PRESCRIPTION OF CONTRACEPTIVE PILLS: ICD-10-CM

## 2017-11-29 RX ORDER — NORGESTIMATE AND ETHINYL ESTRADIOL 7DAYSX3 28
KIT ORAL
Qty: 84 TABLET | Refills: 2 | Status: SHIPPED | OUTPATIENT
Start: 2017-11-29 | End: 2018-05-15

## 2017-12-04 DIAGNOSIS — F40.01 PANIC DISORDER WITH AGORAPHOBIA: ICD-10-CM

## 2017-12-04 DIAGNOSIS — F41.1 GENERALIZED ANXIETY DISORDER: ICD-10-CM

## 2017-12-05 RX ORDER — SERTRALINE HYDROCHLORIDE 100 MG/1
TABLET, FILM COATED ORAL
Qty: 135 TABLET | Refills: 0 | Status: SHIPPED | OUTPATIENT
Start: 2017-12-05 | End: 2018-03-14

## 2017-12-07 DIAGNOSIS — J20.9 ACUTE BRONCHITIS WITH SYMPTOMS > 10 DAYS: ICD-10-CM

## 2017-12-07 RX ORDER — ALBUTEROL SULFATE 90 UG/1
AEROSOL, METERED RESPIRATORY (INHALATION)
Qty: 18 G | Refills: 1 | Status: SHIPPED | OUTPATIENT
Start: 2017-12-07 | End: 2018-10-09

## 2018-01-22 DIAGNOSIS — F41.1 GENERALIZED ANXIETY DISORDER: ICD-10-CM

## 2018-01-22 DIAGNOSIS — G47.9 SLEEP DISTURBANCE: ICD-10-CM

## 2018-01-22 RX ORDER — HYDROXYZINE PAMOATE 50 MG/1
CAPSULE ORAL
Qty: 60 CAPSULE | Refills: 3 | Status: SHIPPED | OUTPATIENT
Start: 2018-01-22 | End: 2018-10-23

## 2018-01-26 ENCOUNTER — OFFICE VISIT (OUTPATIENT)
Dept: FAMILY MEDICINE | Facility: CLINIC | Age: 23
End: 2018-01-26
Payer: COMMERCIAL

## 2018-01-26 VITALS
DIASTOLIC BLOOD PRESSURE: 60 MMHG | HEIGHT: 62 IN | BODY MASS INDEX: 31.83 KG/M2 | RESPIRATION RATE: 22 BRPM | HEART RATE: 80 BPM | WEIGHT: 173 LBS | SYSTOLIC BLOOD PRESSURE: 110 MMHG | TEMPERATURE: 97.2 F | OXYGEN SATURATION: 95 %

## 2018-01-26 DIAGNOSIS — R05.9 COUGH: ICD-10-CM

## 2018-01-26 DIAGNOSIS — J00 ACUTE RHINITIS, UNSPECIFIED TYPE: Primary | ICD-10-CM

## 2018-01-26 DIAGNOSIS — F33.0 MAJOR DEPRESSIVE DISORDER, RECURRENT EPISODE, MILD (H): ICD-10-CM

## 2018-01-26 DIAGNOSIS — Z23 NEED FOR PROPHYLACTIC VACCINATION AND INOCULATION AGAINST INFLUENZA: ICD-10-CM

## 2018-01-26 LAB
FLUAV+FLUBV AG SPEC QL: NEGATIVE
FLUAV+FLUBV AG SPEC QL: NEGATIVE
SPECIMEN SOURCE: NORMAL

## 2018-01-26 PROCEDURE — 99213 OFFICE O/P EST LOW 20 MIN: CPT | Mod: 25 | Performed by: NURSE PRACTITIONER

## 2018-01-26 PROCEDURE — 87804 INFLUENZA ASSAY W/OPTIC: CPT | Performed by: NURSE PRACTITIONER

## 2018-01-26 PROCEDURE — 90471 IMMUNIZATION ADMIN: CPT | Performed by: NURSE PRACTITIONER

## 2018-01-26 PROCEDURE — 90686 IIV4 VACC NO PRSV 0.5 ML IM: CPT | Performed by: NURSE PRACTITIONER

## 2018-01-26 RX ORDER — FLUTICASONE PROPIONATE 50 MCG
2 SPRAY, SUSPENSION (ML) NASAL DAILY
Qty: 1 BOTTLE | Refills: 3 | Status: SHIPPED | OUTPATIENT
Start: 2018-01-26 | End: 2022-09-13

## 2018-01-26 RX ORDER — BENZONATATE 100 MG/1
100-200 CAPSULE ORAL 3 TIMES DAILY PRN
Qty: 42 CAPSULE | Refills: 0 | Status: SHIPPED | OUTPATIENT
Start: 2018-01-26 | End: 2018-03-14

## 2018-01-26 ASSESSMENT — ANXIETY QUESTIONNAIRES
6. BECOMING EASILY ANNOYED OR IRRITABLE: MORE THAN HALF THE DAYS
GAD7 TOTAL SCORE: 8
2. NOT BEING ABLE TO STOP OR CONTROL WORRYING: SEVERAL DAYS
7. FEELING AFRAID AS IF SOMETHING AWFUL MIGHT HAPPEN: SEVERAL DAYS
1. FEELING NERVOUS, ANXIOUS, OR ON EDGE: SEVERAL DAYS
GAD7 TOTAL SCORE: 8
5. BEING SO RESTLESS THAT IT IS HARD TO SIT STILL: SEVERAL DAYS
GAD7 TOTAL SCORE: 8
7. FEELING AFRAID AS IF SOMETHING AWFUL MIGHT HAPPEN: SEVERAL DAYS
3. WORRYING TOO MUCH ABOUT DIFFERENT THINGS: SEVERAL DAYS
4. TROUBLE RELAXING: SEVERAL DAYS

## 2018-01-26 ASSESSMENT — PATIENT HEALTH QUESTIONNAIRE - PHQ9
SUM OF ALL RESPONSES TO PHQ QUESTIONS 1-9: 12
10. IF YOU CHECKED OFF ANY PROBLEMS, HOW DIFFICULT HAVE THESE PROBLEMS MADE IT FOR YOU TO DO YOUR WORK, TAKE CARE OF THINGS AT HOME, OR GET ALONG WITH OTHER PEOPLE: SOMEWHAT DIFFICULT
SUM OF ALL RESPONSES TO PHQ QUESTIONS 1-9: 12

## 2018-01-26 NOTE — LETTER
My Depression Action Plan  Name: Madelyn Quintana   Date of Birth 1995  Date: 1/26/2018    My doctor: Honey Foley   My clinic: [address] [address] [phone]          GREEN    ZONE   Good Control    What it looks like:     Things are going generally well. You have normal up s and down s. You may even feel depressed from time to time, but bad moods usually last less than a day.   What you need to do:  1. Continue to care for yourself (see self care plan)  2. Check your depression survival kit and update it as needed  3. Follow your physician s recommendations including any medication.  4. Do not stop taking medication unless you consult with your physician first.           YELLOW         ZONE Getting Worse    What it looks like:     Depression is starting to interfere with your life.     It may be hard to get out of bed; you may be starting to isolate yourself from others.    Symptoms of depression are starting to last most all day and this has happened for several days.     You may have suicidal thoughts but they are not constant.   What you need to do:     1. Call your care team, your response to treatment will improve if you keep your care team informed of your progress. Yellow periods are signs an adjustment may need to be made.     2. Continue your self-care, even if you have to fake it!    3. Talk to someone in your support network    4. Open up your depression survival kit           RED    ZONE Medical Alert - Get Help    What it looks like:     Depression is seriously interfering with your life.     You may experience these or other symptoms: You can t get out of bed most days, can t work or engage in other necessary activities, you have trouble taking care of basic hygiene, or basic responsibilities, thoughts of suicide or death that will not go away, self-injurious behavior.     What you need to do:  1. Call your care team and  request a same-day appointment. If they are not available (weekends or after hours) call your local crisis line, emergency room or 911.      Electronically signed by: Sandie Llanos, January 26, 2018    Depression Self Care Plan / Survival Kit    Self-Care for Depression  Here s the deal. Your body and mind are really not as separate as most people think.  What you do and think affects how you feel and how you feel influences what you do and think. This means if you do things that people who feel good do, it will help you feel better.  Sometimes this is all it takes.  There is also a place for medication and therapy depending on how severe your depression is, so be sure to consult with your medical provider and/ or Behavioral Health Consultant if your symptoms are worsening or not improving.     In order to better manage my stress, I will:    Exercise  Get some form of exercise, every day. This will help reduce pain and release endorphins, the  feel good  chemicals in your brain. This is almost as good as taking antidepressants!  This is not the same as joining a gym and then never going! (they count on that by the way ) It can be as simple as just going for a walk or doing some gardening, anything that will get you moving.      Hygiene   Maintain good hygiene (Get out of bed in the morning, Make your bed, Brush your teeth, Take a shower, and Get dressed like you were going to work, even if you are unemployed).  If your clothes don't fit try to get ones that do.    Diet  I will strive to eat foods that are good for me, drink plenty of water, and avoid excessive sugar, caffeine, alcohol, and other mood-altering substances.  Some foods that are helpful in depression are: complex carbohydrates, B vitamins, flaxseed, fish or fish oil, fresh fruits and vegetables.    Psychotherapy  I agree to participate in Individual Therapy (if recommended).    Medication  If prescribed medications, I agree to take them.  Missing  doses can result in serious side effects.  I understand that drinking alcohol, or other illicit drug use, may cause potential side effects.  I will not stop my medication abruptly without first discussing it with my provider.    Staying Connected With Others  I will stay in touch with my friends, family members, and my primary care provider/team.    Use your imagination  Be creative.  We all have a creative side; it doesn t matter if it s oil painting, sand castles, or mud pies! This will also kick up the endorphins.    Witness Beauty  (AKA stop and smell the roses) Take a look outside, even in mid-winter. Notice colors, textures. Watch the squirrels and birds.     Service to others  Be of service to others.  There is always someone else in need.  By helping others we can  get out of ourselves  and remember the really important things.  This also provides opportunities for practicing all the other parts of the program.    Humor  Laugh and be silly!  Adjust your TV habits for less news and crime-drama and more comedy.    Control your stress  Try breathing deep, massage therapy, biofeedback, and meditation. Find time to relax each day.     My support system    Clinic Contact:  Phone number:    Contact 1:  Phone number:    Contact 2:  Phone number:    Judaism/:  Phone number:    Therapist:  Phone number:    Local crisis center:    Phone number:    Other community support:  Phone number:

## 2018-01-26 NOTE — PROGRESS NOTES

## 2018-01-26 NOTE — NURSING NOTE
"Chief Complaint   Patient presents with     Pharyngitis       Initial /60  Pulse 80  Temp 97.2  F (36.2  C) (Tympanic)  Resp 22  Ht 5' 2\" (1.575 m)  Wt 173 lb (78.5 kg)  SpO2 95%  BMI 31.64 kg/m2 Estimated body mass index is 31.64 kg/(m^2) as calculated from the following:    Height as of this encounter: 5' 2\" (1.575 m).    Weight as of this encounter: 173 lb (78.5 kg).  Medication Reconciliation: complete    Health Maintenance that is potentially due pending provider review:  PHQ9 and GAD7        Is there anyone who you would like to be able to receive your results? If yes have patient fill out LORETTA    "

## 2018-01-26 NOTE — LETTER
[facility]  [address] [place] [address]  [phone]          January 26, 2018    Madelyn Quintana                                                                                                                     [address] [place] [id number]        To Whom it May Concern,    Madelyn was seen in clinic today 1/26/2018 for acute illness, due to illness she has missed work 1/24/2018 and 1/25/2018. She is okay to go back to work when needed.         Sincerely,       NATHANAEL Khan CNP

## 2018-01-26 NOTE — PROGRESS NOTES
SUBJECTIVE:   Madelyn Quintana is a 22 year old female who presents to clinic today for the following health issues:      ENT Symptoms             Symptoms: cc Present Absent Comment   Fever/Chills  x  Cold to hot sensation   Fatigue  x     Muscle Aches  x     Eye Irritation   x    Sneezing  x     Nasal Андрей/Drg  x  No post nasal drainage    Sinus Pressure/Pain   x    Loss of smell  x     Dental pain   x    Sore Throat  x     Swollen Glands  x     Ear Pain/Fullness   x    Cough  x  Productive - green sputum, no blood    Wheeze  x     Chest Pain  x     Shortness of breath  x     Rash   x    Other    Not sleeping well because of coughing     Symptom duration:  Answers for HPI/ROS submitted by the patient on 1/26/2018   If you checked off any problems, how difficult have these problems made it for you to do your work, take care of things at home, or get along with other people?: Somewhat difficult  PHQ9 TOTAL SCORE: 12  VICTORIA 7 TOTAL SCORE: 8     Symptom severity: 5 days   Treatments tried:  Otc Robitussin    Contacts:  none     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 Changed in the last couple of months. No significant changes, losses or stressors around this time or recently.     See PHQ-9 for current symptoms.  Other associated symptoms: None    Complicating factors:   Significant life event:  No   Current substance abuse:  None  Anxiety or Panic symptoms:  No    PHQ-9 8/4/2016 8/15/2017 1/26/2018   Total Score 11 7 12   Q9: Suicide Ideation Several days Not at all Several days     In the past two weeks have you had thoughts of suicide or self-harm?  Yes  In the past two weeks have you thought of a plan or intent to harm yourself? No.  Do you have concerns about your personal safety or the  safety of others?   No  PHQ-9  English  PHQ-9   Any Language  Suicide Assessment Five-step Evaluation and Treatment (SAFE-T)      Problem list and histories reviewed & adjusted, as indicated.  Additional history: as documented    Patient Active Problem List   Diagnosis     Labial infection     CARDIOVASCULAR SCREENING; LDL GOAL LESS THAN 160     Generalized anxiety disorder     Major depressive disorder, recurrent episode, mild (H)     Panic disorder with agoraphobia     Vitamin D deficiency     History reviewed. No pertinent surgical history.    Social History   Substance Use Topics     Smoking status: Passive Smoke Exposure - Never Smoker     Smokeless tobacco: Never Used      Comment: smoking in house     Alcohol use Yes      Comment: during past week.     Family History   Problem Relation Age of Onset     Unknown/Adopted Paternal Grandmother      Unknown/Adopted Paternal Grandfather      Unknown/Adopted Maternal Grandfather          Current Outpatient Prescriptions   Medication Sig Dispense Refill     fluticasone (FLONASE) 50 MCG/ACT spray Spray 2 sprays into both nostrils daily 1 Bottle 3     benzonatate (TESSALON) 100 MG capsule Take 1-2 capsules (100-200 mg) by mouth 3 times daily as needed 42 capsule 0     hydrOXYzine (VISTARIL) 50 MG capsule Take 1-2 capsules ( mg) at bedtime as needed for sleep. 60 capsule 3     VENTOLIN  (90 BASE) MCG/ACT Inhaler INHALE 2 PUFFS INTO THE LUNGS EVERY 6 HOURS AS NEEDED FOR SHORTNESS OF BREATH, DIFFICULTY BREATHING OR WHEEZING. 18 g 1     sertraline (ZOLOFT) 100 MG tablet TAKE ONE AND ONE-HALF TABLETS BY MOUTH DAILY 135 tablet 0     TRI-LINYAH 0.18/0.215/0.25 MG-35 MCG per tablet TAKE ONE TABLET BY MOUTH EVERY DAY 84 tablet 2     LORazepam (ATIVAN) 1 MG tablet Take 1/2 - 1 tab as needed for panic up to 3 times a week PRN 20 tablet 3     promethazine (PHENERGAN) 25 MG tablet Take 1 tablet (25 mg) by mouth every 6 hours as needed for nausea 20 tablet 1      "fluocinonide (LIDEX) 0.05 % cream Apply sparingly to affected area twice daily as needed.  Do not apply to face. 60 g 1     Allergies   Allergen Reactions     Lactose Diarrhea     Intolerance       Reviewed and updated as needed this visit by clinical staff  Tobacco  Allergies  Meds  Problems  Med Hx  Surg Hx  Fam Hx  Soc Hx        Reviewed and updated as needed this visit by Provider  Tobacco  Allergies  Meds  Problems  Med Hx  Surg Hx  Fam Hx  Soc Hx          ROS:  Constitutional, HEENT, cardiovascular, pulmonary, gi and gu systems are negative, except as otherwise noted.    OBJECTIVE:     /60  Pulse 80  Temp 97.2  F (36.2  C) (Tympanic)  Resp 22  Ht 5' 2\" (1.575 m)  Wt 173 lb (78.5 kg)  SpO2 95%  BMI 31.64 kg/m2  Body mass index is 31.64 kg/(m^2).  GENERAL APPEARANCE: healthy, alert and no distress  EYES: Eyes grossly normal to inspection, PERRL and conjunctivae and sclerae normal  HENT: ear canals and TM's normal, nose and mouth without ulcers or lesions and nasal mucosa edematous without rhinorrhea  NECK: no adenopathy, no asymmetry, masses, or scars and thyroid normal to palpation  RESP: lungs clear to auscultation - no rales, rhonchi or wheezes  CV: regular rates and rhythm, normal S1 S2, no S3 or S4 and no murmur, click or rub  ABDOMEN: soft, nontender, without hepatosplenomegaly or masses and bowel sounds normal  PSYCH: mentation appears normal, affect normal and not as bright due to illness    Diagnostic Test Results:  Results for orders placed or performed in visit on 01/26/18 (from the past 24 hour(s))   Influenza A/B antigen   Result Value Ref Range    Influenza A/B Agn Specimen Nasal     Influenza A Negative NEG^Negative    Influenza B Negative NEG^Negative       ASSESSMENT/PLAN:     1. Acute rhinitis, unspecified type  Supportive cares along with nasal steroid and tessalon for cough.   - fluticasone (FLONASE) 50 MCG/ACT spray; Spray 2 sprays into both nostrils daily  " Dispense: 1 Bottle; Refill: 3  - benzonatate (TESSALON) 100 MG capsule; Take 1-2 capsules (100-200 mg) by mouth 3 times daily as needed  Dispense: 42 capsule; Refill: 0    2. Cough  Influenza negative. See above   - Influenza A/B antigen  - fluticasone (FLONASE) 50 MCG/ACT spray; Spray 2 sprays into both nostrils daily  Dispense: 1 Bottle; Refill: 3  - benzonatate (TESSALON) 100 MG capsule; Take 1-2 capsules (100-200 mg) by mouth 3 times daily as needed  Dispense: 42 capsule; Refill: 0    3. Major depressive disorder, recurrent episode, mild (H)  Doing worse recently in the last couple of months. Talked with patient over the phone after office visit. Feels Zoloft isn't as effective anymore. Has some suicidal thoughts, no plans and has a good support system. Discussed patient seeing me in the next week to discuss further options. Patient agreeable and will make appointment.     4. Need for prophylactic vaccination and inoculation against influenza    - FLU VAC, SPLIT VIRUS IM > 3 YO (QUADRIVALENT) [70193]  - Vaccine Administration, Initial [68465]    Patient Instructions   Influenza negative     This is most likely sinus symptoms causing cough and sore throat     Make sure you are getting a lot of rest and fluids  Ibuprofen and/or tylenol for discomfort or fever  Warm salt water gargles 3-4 times a day for sore throat   Start Flonase nasal spray daily (this may take a couple of weeks to be most effective)  Cool mist vaporizer at night   If you can tolerate you can use a nasal saline flush such as the [place] Pot  Sleep with head of bed up at night to promote drainage     If no improvement after a week return to clinic             NATHANAEL [facility]

## 2018-01-26 NOTE — MR AVS SNAPSHOT
After Visit Summary   1/26/2018    Madelyn Quintana    MRN: [id number]           Patient Information     Date Of Birth          1995        Visit Information        Provider Department      1/26/2018 9:00 AM Honey Foley APRN CNP [facility]        Today's Diagnoses     Cough    -  1    Acute rhinitis, unspecified type          Care Instructions    Influenza negative     This is most likely sinus symptoms causing cough and sore throat     Make sure you are getting a lot of rest and fluids  Ibuprofen and/or tylenol for discomfort or fever  Warm salt water gargles 3-4 times a day for sore throat   Start Flonase nasal spray daily (this may take a couple of weeks to be most effective)  Cool mist vaporizer at night   If you can tolerate you can use a nasal saline flush such as the [place] Pot  Sleep with head of bed up at night to promote drainage     If no improvement after a week return to clinic                 Follow-ups after your visit        Who to contact     If you have questions or need follow up information about today's clinic visit or your schedule please contact [facility] directly at [phone].  Normal or non-critical lab and imaging results will be communicated to you by [employer], letter or phone within 4 business days after the clinic has received the results. If you do not hear from us within 7 days, please contact the clinic through [employer] or phone. If you have a critical or abnormal lab result, we will notify you by phone as soon as possible.  Submit refill requests through [employer] or call your pharmacy and they will forward the refill request to us. Please allow 3 business days for your refill to be completed.          Additional Information About Your Visit        MyChart Information     [employer] gives you secure access to your electronic health record. If you see a primary care provider, you can also send messages to your care team and  "make appointments. If you have questions, please call your primary care clinic.  If you do not have a primary care provider, please call [phone] and they will assist you.        Care EveryWhere ID     This is your Care EveryWhere ID. This could be used by other organizations to access your [place] medical records  [id number]        Your Vitals Were     Pulse Temperature Respirations Height Pulse Oximetry BMI (Body Mass Index)    80 97.2  F (36.2  C) (Tympanic) 22 5' 2\" (1.575 m) 95% 31.64 kg/m2       Blood Pressure from Last 3 Encounters:   01/26/18 110/60   08/31/17 104/60   08/15/17 110/64    Weight from Last 3 Encounters:   01/26/18 173 lb (78.5 kg)   08/31/17 156 lb 6.4 oz (70.9 kg)   08/15/17 155 lb (70.3 kg)              We Performed the Following     Influenza A/B antigen          Today's Medication Changes          These changes are accurate as of 1/26/18 10:20 AM.  If you have any questions, ask your nurse or doctor.               Start taking these medicines.        Dose/Directions    benzonatate 100 MG capsule   Commonly known as:  TESSALON   Used for:  Cough, Acute rhinitis, unspecified type   Started by:  Honey Foley APRN CNP        Dose:  100-200 mg   Take 1-2 capsules (100-200 mg) by mouth 3 times daily as needed   Quantity:  42 capsule   Refills:  0       fluticasone 50 MCG/ACT spray   Commonly known as:  FLONASE   Used for:  Acute rhinitis, unspecified type, Cough   Started by:  Honey Foley APRN CNP        Dose:  2 spray   Spray 2 sprays into both nostrils daily   Quantity:  1 Bottle   Refills:  3            Where to get your medicines      These medications were sent to [facility] [address] [address]     Phone:  [phone]     benzonatate 100 MG capsule    fluticasone 50 MCG/ACT spray                Primary Care Provider Office Phone # Fax #    NATHANAEL Medeiros [phone] " [phone]       [facility] [address] [facility] [address]        Equal Access to Services     JAY SALAZAR : Hadii aad ku hadtamyjudd Roxy, watrevada luqjulieth, tereseta kawesleyda xavimilena, kristi nevaeh moniciera hurleyrian gayatricarlos chino aburto. So [facility] [phone].    ATENCIÓN: Si habla español, tiene a abdullahi disposición servicios gratuitos de asistencia lingüística. Llame al [phone].    We comply with applicable federal civil rights laws and [place] laws. We do not discriminate on the basis of race, color, national origin, age, disability, sex, sexual orientation, or gender identity.            Thank you!     Thank you for choosing [facility]  for your care. Our goal is always to provide you with excellent care. Hearing back from our patients is one way we can continue to improve our services. Please take a few minutes to complete the written survey that you may receive in the mail after your visit with us. Thank you!             Your Updated Medication List - Protect others around you: Learn how to safely use, store and throw away your medicines at www.disposemymeds.org.          This list is accurate as of 1/26/18 10:20 AM.  Always use your most recent med list.                   Brand Name Dispense Instructions for use Diagnosis    benzonatate 100 MG capsule    TESSALON    42 capsule    Take 1-2 capsules (100-200 mg) by mouth 3 times daily as needed    Cough, Acute rhinitis, unspecified type       fluocinonide 0.05 % cream    LIDEX    60 g    Apply sparingly to affected area twice daily as needed.  Do not apply to face.    Rash       fluticasone 50 MCG/ACT spray    FLONASE    1 Bottle    Spray 2 sprays into both nostrils daily    Acute rhinitis, unspecified type, Cough       hydrOXYzine 50 MG capsule    VISTARIL    60 capsule    Take 1-2 capsules ( mg) at bedtime as needed for sleep.    Sleep disturbance, Generalized anxiety disorder       LORazepam 1 MG tablet    ATIVAN     20 tablet    Take 1/2 - 1 tab as needed for panic up to 3 times a week PRN    Panic disorder with agoraphobia       promethazine 25 MG tablet    PHENERGAN    20 tablet    Take 1 tablet (25 mg) by mouth every 6 hours as needed for nausea    Nausea       sertraline 100 MG tablet    ZOLOFT    135 tablet    TAKE ONE AND ONE-HALF TABLETS BY MOUTH DAILY    Generalized anxiety disorder, Panic disorder with agoraphobia       TRI-LINYAH 0.18/0.215/0.25 MG-35 MCG per tablet   Generic drug:  norgestim-eth estrad triphasic     84 tablet    TAKE ONE TABLET BY MOUTH EVERY DAY    Encounter for initial prescription of contraceptive pills       VENTOLIN  (90 BASE) MCG/ACT Inhaler   Generic drug:  albuterol     18 g    INHALE 2 PUFFS INTO THE LUNGS EVERY 6 HOURS AS NEEDED FOR SHORTNESS OF BREATH, DIFFICULTY BREATHING OR WHEEZING.    Acute bronchitis with symptoms > 10 days

## 2018-01-26 NOTE — PATIENT INSTRUCTIONS
Influenza negative     This is most likely sinus symptoms causing cough and sore throat     Make sure you are getting a lot of rest and fluids  Ibuprofen and/or tylenol for discomfort or fever  Warm salt water gargles 3-4 times a day for sore throat   Start Flonase nasal spray daily (this may take a couple of weeks to be most effective)  Cool mist vaporizer at night   If you can tolerate you can use a nasal saline flush such as the Safia Pot  Sleep with head of bed up at night to promote drainage     If no improvement after a week return to clinic

## 2018-01-27 ASSESSMENT — PATIENT HEALTH QUESTIONNAIRE - PHQ9: SUM OF ALL RESPONSES TO PHQ QUESTIONS 1-9: 12

## 2018-01-27 ASSESSMENT — ANXIETY QUESTIONNAIRES: GAD7 TOTAL SCORE: 8

## 2018-03-14 ENCOUNTER — OFFICE VISIT (OUTPATIENT)
Dept: FAMILY MEDICINE | Facility: CLINIC | Age: 23
End: 2018-03-14
Payer: COMMERCIAL

## 2018-03-14 VITALS
DIASTOLIC BLOOD PRESSURE: 68 MMHG | BODY MASS INDEX: 32.15 KG/M2 | SYSTOLIC BLOOD PRESSURE: 120 MMHG | TEMPERATURE: 97.4 F | WEIGHT: 175.8 LBS | HEART RATE: 80 BPM | RESPIRATION RATE: 16 BRPM

## 2018-03-14 DIAGNOSIS — F40.01 PANIC DISORDER WITH AGORAPHOBIA: ICD-10-CM

## 2018-03-14 DIAGNOSIS — F33.0 MAJOR DEPRESSIVE DISORDER, RECURRENT EPISODE, MILD (H): Primary | ICD-10-CM

## 2018-03-14 PROCEDURE — 99214 OFFICE O/P EST MOD 30 MIN: CPT | Performed by: NURSE PRACTITIONER

## 2018-03-14 RX ORDER — CITALOPRAM HYDROBROMIDE 20 MG/1
TABLET ORAL
Qty: 30 TABLET | Refills: 0 | Status: SHIPPED | OUTPATIENT
Start: 2018-03-14 | End: 2018-04-26

## 2018-03-14 ASSESSMENT — PAIN SCALES - GENERAL: PAINLEVEL: NO PAIN (0)

## 2018-03-14 NOTE — MR AVS SNAPSHOT
After Visit Summary   3/14/2018    Madelyn Quintana    MRN: [id number]           Patient Information     Date Of Birth          1995        Visit Information        Provider Department      3/14/2018 2:40 PM Honey Foley APRN [facility]        Today's Diagnoses     Major depressive disorder, recurrent episode, mild (H)    -  1    Panic disorder with agoraphobia        Hx of self-harm          Care Instructions    Wean down off of the Zoloft - start with 100 mg daily for 7 days, then 50 mg daily for 5 days, then 50 mg every other day for 5 days, then stop     Start Celexa as advised on bottle     Know and go to your resources, including myself     Schedule appointment with psychiatry with medication management AND counseling/therapy - other resources below     Return to clinic to see me in 1 month     [facility]) -- [place] -- [phone]  [facility])-- [phone]  [facility] ([place]) -- [phone]  [facility]) -- [phone]  [facility] ([place]) -- [phone]  [facility] ([place], [facility] as well) -- [phone]  [facility]) -- [phone]  [facility] ([place], multiple locations) -- [phone]  [facility] ([facility], [facility] locations) -- [phone]              Follow-ups after your visit        Additional Services     MENTAL HEALTH REFERRAL  - Adult; Psychiatry and Medication Management; Psychiatry; Other: [facility] [phone]; We will contact you to schedule the appointment or please call with any questions       All scheduling is subject to the client's specific insurance plan & benefits, provider/location availability, and provider clinical specialities.  Please arrive 15 minutes early for your first appointment and bring your completed paperwork.    Please be aware  that coverage of these services is subject to the terms and limitations of your health insurance plan.  Call member services at your health plan with any benefit or coverage questions.                            Who to contact     If you have questions or need follow up information about today's clinic visit or your schedule please contact [facility] directly at [phone].  Normal or non-critical lab and imaging results will be communicated to you by MyChart, letter or phone within 4 business days after the clinic has received the results. If you do not hear from us within 7 days, please contact the clinic through [employer] or phone. If you have a critical or abnormal lab result, we will notify you by phone as soon as possible.  Submit refill requests through [employer] or call your pharmacy and they will forward the refill request to us. Please allow 3 business days for your refill to be completed.          Additional Information About Your Visit        MyChart Information     [employer] gives you secure access to your electronic health record. If you see a primary care provider, you can also send messages to your care team and make appointments. If you have questions, please call your primary care clinic.  If you do not have a primary care provider, please call [phone] and they will assist you.        Care EveryWhere ID     This is your Care EveryWhere ID. This could be used by other organizations to access your [place] medical records  [id number]        Your Vitals Were     Pulse Temperature Respirations BMI (Body Mass Index)          80 97.4  F (36.3  C) (Tympanic) 16 32.15 kg/m2         Blood Pressure from Last 3 Encounters:   03/14/18 120/68   01/26/18 110/60   08/31/17 104/60    Weight from Last 3 Encounters:   03/14/18 175 lb 12.8 oz (79.7 kg)   01/26/18 173 lb (78.5 kg)   08/31/17 156 lb 6.4 oz (70.9 kg)              We Performed the Following     MENTAL HEALTH REFERRAL  - Adult;  Psychiatry and Medication Management; Psychiatry; Other: [facility] [phone]; We will contact you to schedule the appointment or please call with any questions          Today's Medication Changes          These changes are accurate as of 3/14/18  4:09 PM.  If you have any questions, ask your nurse or doctor.               Start taking these medicines.        Dose/Directions    citalopram 20 MG tablet   Commonly known as:  celeXA   Used for:  Panic disorder with agoraphobia, Major depressive disorder, recurrent episode, mild (H)   Started by:  Honey Foley APRN CNP        Take 1/2 tablet (10 mg) for 1-2 weeks, then increase to 1 tablet orally daily   Quantity:  30 tablet   Refills:  0         Stop taking these medicines if you haven't already. Please contact your care team if you have questions.     sertraline 100 MG tablet   Commonly known as:  ZOLOFT   Stopped by:  Honey Foley APRN CNP                Where to get your medicines      These medications were sent to [place] [address]     Phone:  [phone]     citalopram 20 MG tablet                Primary Care Provider Office Phone # Fax #    NATHANAEL Medeiros [phone]       [place] [id number]        Equal Access to Services     JAY SALAZAR : Asha lentz hadasho Soomaali, waaxda luqadaha, qaybta kaalmada adeegyada, kristi aburto. So [facility] [phone].    ATENCIÓN: Si habla español, tiene a abdullahi disposición servicios gratuitos de asistencia lingüística. Llame al [phone].    We comply with applicable federal civil rights laws and [place] laws. We do not discriminate on the basis of race, color, national origin, age, disability, sex, sexual orientation, or gender identity.            Thank you!     Thank you for choosing  [facility]  for your care. Our goal is always to provide you with excellent care. Hearing back from our patients is one way we can continue to improve our services. Please take a few minutes to complete the written survey that you may receive in the mail after your visit with us. Thank you!             Your Updated Medication List - Protect others around you: Learn how to safely use, store and throw away your medicines at www.disposemymeds.org.          This list is accurate as of 3/14/18  4:09 PM.  Always use your most recent med list.                   Brand Name Dispense Instructions for use Diagnosis    citalopram 20 MG tablet    celeXA    30 tablet    Take 1/2 tablet (10 mg) for 1-2 weeks, then increase to 1 tablet orally daily    Panic disorder with agoraphobia, Major depressive disorder, recurrent episode, mild (H)       fluocinonide 0.05 % cream    LIDEX    60 g    Apply sparingly to affected area twice daily as needed.  Do not apply to face.    Rash       fluticasone 50 MCG/ACT spray    FLONASE    1 Bottle    Spray 2 sprays into both nostrils daily    Acute rhinitis, unspecified type, Cough       hydrOXYzine 50 MG capsule    VISTARIL    60 capsule    Take 1-2 capsules ( mg) at bedtime as needed for sleep.    Sleep disturbance, Generalized anxiety disorder       LORazepam 1 MG tablet    ATIVAN    20 tablet    Take 1/2 - 1 tab as needed for panic up to 3 times a week PRN    Panic disorder with agoraphobia       TRI-LINYAH 0.18/0.215/0.25 MG-35 MCG per tablet   Generic drug:  norgestim-eth estrad triphasic     84 tablet    TAKE ONE TABLET BY MOUTH EVERY DAY    Encounter for initial prescription of contraceptive pills       VENTOLIN  (90 BASE) MCG/ACT Inhaler   Generic drug:  albuterol     18 g    INHALE 2 PUFFS INTO THE LUNGS EVERY 6 HOURS AS NEEDED FOR SHORTNESS OF BREATH, DIFFICULTY BREATHING OR WHEEZING.    Acute bronchitis with symptoms > 10 days

## 2018-03-14 NOTE — PROGRESS NOTES
SUBJECTIVE:   Madelyn Quintana is a 22 year old female who presents to clinic today for the following health issues:      Depression and Anxiety Follow-Up    Status since last visit: feels more impulsive, depression has worsened more than anxiety    Other associated symptoms:None    Complicating factors:     Significant life event: No     Current substance abuse: None    PHQ-9 8/4/2016 8/15/2017 1/26/2018   Total Score 11 7 12   Q9: Suicide Ideation Several days Not at all Several days     VICTORIA-7 SCORE 6/7/2016 8/4/2016 1/26/2018   Total Score - - 8 (mild anxiety)   Total Score 16 8 8     In the past two weeks have you had thoughts of suicide or self-harm?  Yes  In the past two weeks have you thought of a plan or intent to harm yourself? Yes  In the past two weeks have you acted on these thoughts in any way?  Yes  Do you have concerns about your personal safety or the safety of others?   No  PHQ-9  English  PHQ-9   Any Language  VICTORIA-7  Suicide Assessment Five-step Evaluation and Treatment (SAFE-T)      In January at last office visit - patient reports having feelings of intrusive thoughts and then deepen, will self-harm - mainly cutting, has done since childhood. Cut 3 days ago. Doesn't have a plan to kill herself. Cuts about once a month or so.   Triggers - none - has a very supportive boyfriend  Dad has significant depression and anxiety per patient, however is not medicated - doesn't believe in  Hospitalized once in ER a couple of years ago for outburst     Admits to having a bad childhood, but will not expand  Has seen Caren He in [place] - counseling. Hasn't seen in about a year, due to time consumption and got to be so she didn't have anything to talk about. She was doing medication management at that time. Has been on Zoloft since about 2015 per charts.   Has more anxiety and feels this almost makes depression worse     Sleeps okay, goes back and forth, either sleeping too much or not at all   Denies  any drug or alcohol use, only cigarettes     Lorazepam for anxiety- has had to take 1.5 tablets to be effective, but works. Not as well as it used to.     Problem list and histories reviewed & adjusted, as indicated.  Additional history: as documented    Patient Active Problem List   Diagnosis     Labial infection     CARDIOVASCULAR SCREENING; LDL GOAL LESS THAN 160     Generalized anxiety disorder     Major depressive disorder, recurrent episode, mild (H)     Panic disorder with agoraphobia     Vitamin D deficiency     History reviewed. No pertinent surgical history.    Social History   Substance Use Topics     Smoking status: Current Every Day Smoker     Smokeless tobacco: Never Used      Comment: smoking in house     Alcohol use Yes      Comment: during past week.     Family History   Problem Relation Age of Onset     Unknown/Adopted Paternal Grandmother      Unknown/Adopted Paternal Grandfather      Unknown/Adopted Maternal Grandfather          Current Outpatient Prescriptions   Medication Sig Dispense Refill     citalopram (CELEXA) 20 MG tablet Take 1/2 tablet (10 mg) for 1-2 weeks, then increase to 1 tablet orally daily 30 tablet 0     hydrOXYzine (VISTARIL) 50 MG capsule Take 1-2 capsules ( mg) at bedtime as needed for sleep. 60 capsule 3     VENTOLIN  (90 BASE) MCG/ACT Inhaler INHALE 2 PUFFS INTO THE LUNGS EVERY 6 HOURS AS NEEDED FOR SHORTNESS OF BREATH, DIFFICULTY BREATHING OR WHEEZING. 18 g 1     TRI-LINYAH 0.18/0.215/0.25 MG-35 MCG per tablet TAKE ONE TABLET BY MOUTH EVERY DAY 84 tablet 2     LORazepam (ATIVAN) 1 MG tablet Take 1/2 - 1 tab as needed for panic up to 3 times a week PRN 20 tablet 3     fluocinonide (LIDEX) 0.05 % cream Apply sparingly to affected area twice daily as needed.  Do not apply to face. 60 g 1     fluticasone (FLONASE) 50 MCG/ACT spray Spray 2 sprays into both nostrils daily (Patient not taking: Reported on 3/14/2018) 1 Bottle 3     Allergies   Allergen Reactions      Lactose Diarrhea     Intolerance       Reviewed and updated as needed this visit by clinical staff  Tobacco  Allergies  Meds  Problems  Med Hx  Surg Hx  Fam Hx  Soc Hx        Reviewed and updated as needed this visit by Provider  Tobacco  Allergies  Meds  Problems  Med Hx  Surg Hx  Fam Hx  Soc Hx          ROS:  Constitutional, HEENT, cardiovascular, pulmonary, gi and gu systems are negative, except as otherwise noted.    OBJECTIVE:     /68 (BP Location: Right arm, Patient Position: Chair, Cuff Size: Adult Regular)  Pulse 80  Temp 97.4  F (36.3  C) (Tympanic)  Resp 16  Wt 175 lb 12.8 oz (79.7 kg)  BMI 32.15 kg/m2  Body mass index is 32.15 kg/(m^2).  GENERAL APPEARANCE: healthy, alert and no distress  RESP: lungs clear to auscultation - no rales, rhonchi or wheezes  CV: regular rates and rhythm, normal S1 S2, no S3 or S4 and no murmur, click or rub  MS: extremities normal- no gross deformities noted  SKIN: 2-3 day old - 4 superficial cuts on left inner wrist. Scarring cuts on right inner wrist  NEURO: Normal strength and tone, mentation intact and speech normal  PSYCH: mentation appears normal, affect normal/bright and laughing, seemed to laugh at inappropriate times such as when talking about self harm    Diagnostic Test Results:  none     ASSESSMENT/PLAN:     1. Major depressive disorder, recurrent episode, mild (H)  Patient has a significant history of major depression. Has not seen psych in well over a year. Has been on Zoloft since 2015, reports not working very well. Is cutting again has no intent on killing herself. Has occasional inappropriate response to situation such as laughing when talking about self-harming herself. Patient denies any drug or alcohol use. Patient reports good support system and feels safe going home with medication change with close follow up. Will wean off of Zoloft and start Celexa. Will call patient next week to ensure she makes appointments with psych and  counseling.    - citalopram (CELEXA) 20 MG tablet; Take 1/2 tablet (10 mg) for 1-2 weeks, then increase to 1 tablet orally daily  Dispense: 30 tablet; Refill: 0  - MENTAL HEALTH REFERRAL  - Adult; Psychiatry and Medication Management; Psychiatry; Other: [facility] [phone]; We will contact you to schedule the appointment or please call with any questions    2. Panic disorder with agoraphobia  Patient has intermittent panic attacks, takes Lorazepam when needed. None given today, would prefer not to do benzo. Change medication and get into psych and therapy.   - citalopram (CELEXA) 20 MG tablet; Take 1/2 tablet (10 mg) for 1-2 weeks, then increase to 1 tablet orally daily  Dispense: 30 tablet; Refill: 0  - MENTAL HEALTH REFERRAL  - Adult; Psychiatry and Medication Management; Psychiatry; Other: [facility] [phone]; We will contact you to schedule the appointment or please call with any questions    3. Hx of self-harm    - MENTAL HEALTH REFERRAL  - Adult; Psychiatry and Medication Management; Psychiatry; Other: [facility] [phone]; We will contact you to schedule the appointment or please call with any questions    Patient Instructions   Wean down off of the Zoloft - start with 100 mg daily for 7 days, then 50 mg daily for 5 days, then 50 mg every other day for 5 days, then stop     Start Celexa as advised on bottle     Know and go to your resources, including myself     Schedule appointment with psychiatry with medication management AND counseling/therapy - other resources below     Return to clinic to see me in 1 month     [facility] ([place]) -- Alphonse Genao -- [phone]  [facility] ([place])-- [phone]  [facility] ([place]) -- [phone]  [facility] ([facility]) -- [phone]  [facility] ([place]) -- [phone]  [facility] ([place], other [facility] as well) -- [phone]  [facility] ([place]) -- [phone]  [facility] ([place], multiple locations) -- [phone]  [facility] ([facility], multiple locations) -- [phone]          NATHANAEL Shaikh [facility]

## 2018-03-14 NOTE — PATIENT INSTRUCTIONS
Wean down off of the Zoloft - start with 100 mg daily for 7 days, then 50 mg daily for 5 days, then 50 mg every other day for 5 days, then stop     Start Celexa as advised on bottle     Know and go to your resources, including myself     Schedule appointment with psychiatry with medication management AND counseling/therapy - other resources below     Return to clinic to see me in 1 month     [place] ([place]) -- Alphonse Genao -- [phone]  [facility] ([place])-- [phone]  [facility] ([place]) -- [phone]  [facility]) -- [phone]  [facility] ([place]) -- [phone]  [facility] ([place], other [facility]) -- [phone]  [facility] ([place]) -- [phone]  [facility] ([place], multiple locations) -- [phone]  [facility] ([facility], multiple locations) -- [phone]

## 2018-03-14 NOTE — NURSING NOTE
"Chief Complaint   Patient presents with     Gastrophageal Reflux       Initial /68 (BP Location: Right arm, Patient Position: Chair, Cuff Size: Adult Regular)  Pulse 80  Temp 97.4  F (36.3  C) (Tympanic)  Resp 16  Wt 175 lb 12.8 oz (79.7 kg)  BMI 32.15 kg/m2 Estimated body mass index is 32.15 kg/(m^2) as calculated from the following:    Height as of 1/26/18: 5' 2\" (1.575 m).    Weight as of this encounter: 175 lb 12.8 oz (79.7 kg).      Health Maintenance that is potentially due pending provider review:  PHQ9    Tory Osborne MA  2:56 PM 3/14/2018  .        "

## 2018-03-16 ASSESSMENT — ANXIETY QUESTIONNAIRES
1. FEELING NERVOUS, ANXIOUS, OR ON EDGE: MORE THAN HALF THE DAYS
7. FEELING AFRAID AS IF SOMETHING AWFUL MIGHT HAPPEN: MORE THAN HALF THE DAYS
2. NOT BEING ABLE TO STOP OR CONTROL WORRYING: MORE THAN HALF THE DAYS
5. BEING SO RESTLESS THAT IT IS HARD TO SIT STILL: SEVERAL DAYS
3. WORRYING TOO MUCH ABOUT DIFFERENT THINGS: MORE THAN HALF THE DAYS
GAD7 TOTAL SCORE: 14
6. BECOMING EASILY ANNOYED OR IRRITABLE: MORE THAN HALF THE DAYS

## 2018-03-16 ASSESSMENT — PATIENT HEALTH QUESTIONNAIRE - PHQ9: 5. POOR APPETITE OR OVEREATING: NEARLY EVERY DAY

## 2018-03-17 ASSESSMENT — PATIENT HEALTH QUESTIONNAIRE - PHQ9: SUM OF ALL RESPONSES TO PHQ QUESTIONS 1-9: 13

## 2018-03-17 ASSESSMENT — ANXIETY QUESTIONNAIRES: GAD7 TOTAL SCORE: 14

## 2018-04-27 ENCOUNTER — MYC MEDICAL ADVICE (OUTPATIENT)
Dept: FAMILY MEDICINE | Facility: CLINIC | Age: 23
End: 2018-04-27

## 2018-05-15 ENCOUNTER — OFFICE VISIT (OUTPATIENT)
Dept: FAMILY MEDICINE | Facility: CLINIC | Age: 23
End: 2018-05-15
Payer: COMMERCIAL

## 2018-05-15 VITALS
SYSTOLIC BLOOD PRESSURE: 110 MMHG | BODY MASS INDEX: 33.51 KG/M2 | DIASTOLIC BLOOD PRESSURE: 68 MMHG | HEART RATE: 92 BPM | WEIGHT: 183.2 LBS

## 2018-05-15 DIAGNOSIS — F40.01 PANIC DISORDER WITH AGORAPHOBIA: ICD-10-CM

## 2018-05-15 DIAGNOSIS — F41.1 GENERALIZED ANXIETY DISORDER: ICD-10-CM

## 2018-05-15 DIAGNOSIS — F33.0 MAJOR DEPRESSIVE DISORDER, RECURRENT EPISODE, MILD (H): Primary | ICD-10-CM

## 2018-05-15 DIAGNOSIS — Z30.011 ENCOUNTER FOR INITIAL PRESCRIPTION OF CONTRACEPTIVE PILLS: ICD-10-CM

## 2018-05-15 DIAGNOSIS — R53.83 FATIGUE, UNSPECIFIED TYPE: ICD-10-CM

## 2018-05-15 LAB
ALBUMIN SERPL-MCNC: 3.7 G/DL (ref 3.4–5)
ALP SERPL-CCNC: 92 U/L (ref 40–150)
ALT SERPL W P-5'-P-CCNC: 26 U/L (ref 0–50)
ANION GAP SERPL CALCULATED.3IONS-SCNC: 10 MMOL/L (ref 3–14)
AST SERPL W P-5'-P-CCNC: 24 U/L (ref 0–45)
BILIRUB SERPL-MCNC: 0.2 MG/DL (ref 0.2–1.3)
BUN SERPL-MCNC: 12 MG/DL (ref 7–30)
CALCIUM SERPL-MCNC: 9.2 MG/DL (ref 8.5–10.1)
CHLORIDE SERPL-SCNC: 107 MMOL/L (ref 94–109)
CO2 SERPL-SCNC: 22 MMOL/L (ref 20–32)
CREAT SERPL-MCNC: 0.71 MG/DL (ref 0.52–1.04)
ERYTHROCYTE [DISTWIDTH] IN BLOOD BY AUTOMATED COUNT: 12.8 % (ref 10–15)
GFR SERPL CREATININE-BSD FRML MDRD: >90 ML/MIN/1.7M2
GLUCOSE SERPL-MCNC: 85 MG/DL (ref 70–99)
HCT VFR BLD AUTO: 41 % (ref 35–47)
HGB BLD-MCNC: 13.9 G/DL (ref 11.7–15.7)
MCH RBC QN AUTO: 29.3 PG (ref 26.5–33)
MCHC RBC AUTO-ENTMCNC: 33.9 G/DL (ref 31.5–36.5)
MCV RBC AUTO: 87 FL (ref 78–100)
PLATELET # BLD AUTO: 291 10E9/L (ref 150–450)
POTASSIUM SERPL-SCNC: 4.1 MMOL/L (ref 3.4–5.3)
PROT SERPL-MCNC: 7.6 G/DL (ref 6.8–8.8)
RBC # BLD AUTO: 4.74 10E12/L (ref 3.8–5.2)
SODIUM SERPL-SCNC: 139 MMOL/L (ref 133–144)
TSH SERPL DL<=0.005 MIU/L-ACNC: 0.66 MU/L (ref 0.4–4)
WBC # BLD AUTO: 6.2 10E9/L (ref 4–11)

## 2018-05-15 PROCEDURE — 36415 COLL VENOUS BLD VENIPUNCTURE: CPT | Performed by: NURSE PRACTITIONER

## 2018-05-15 PROCEDURE — 84443 ASSAY THYROID STIM HORMONE: CPT | Performed by: NURSE PRACTITIONER

## 2018-05-15 PROCEDURE — 80053 COMPREHEN METABOLIC PANEL: CPT | Performed by: NURSE PRACTITIONER

## 2018-05-15 PROCEDURE — 82306 VITAMIN D 25 HYDROXY: CPT | Performed by: NURSE PRACTITIONER

## 2018-05-15 PROCEDURE — 99214 OFFICE O/P EST MOD 30 MIN: CPT | Performed by: NURSE PRACTITIONER

## 2018-05-15 PROCEDURE — 85027 COMPLETE CBC AUTOMATED: CPT | Performed by: NURSE PRACTITIONER

## 2018-05-15 RX ORDER — SERTRALINE HYDROCHLORIDE 100 MG/1
150 TABLET, FILM COATED ORAL DAILY
Qty: 90 TABLET | Refills: 1 | Status: SHIPPED | OUTPATIENT
Start: 2018-05-15 | End: 2018-11-16

## 2018-05-15 RX ORDER — NORGESTIMATE AND ETHINYL ESTRADIOL 7DAYSX3 28
1 KIT ORAL DAILY
Qty: 84 TABLET | Refills: 3 | Status: SHIPPED | OUTPATIENT
Start: 2018-05-15 | End: 2019-10-07

## 2018-05-15 ASSESSMENT — ANXIETY QUESTIONNAIRES
1. FEELING NERVOUS, ANXIOUS, OR ON EDGE: SEVERAL DAYS
5. BEING SO RESTLESS THAT IT IS HARD TO SIT STILL: SEVERAL DAYS
GAD7 TOTAL SCORE: 12
GAD7 TOTAL SCORE: 12
7. FEELING AFRAID AS IF SOMETHING AWFUL MIGHT HAPPEN: MORE THAN HALF THE DAYS
GAD7 TOTAL SCORE: 12
4. TROUBLE RELAXING: SEVERAL DAYS
7. FEELING AFRAID AS IF SOMETHING AWFUL MIGHT HAPPEN: MORE THAN HALF THE DAYS
3. WORRYING TOO MUCH ABOUT DIFFERENT THINGS: NEARLY EVERY DAY
6. BECOMING EASILY ANNOYED OR IRRITABLE: MORE THAN HALF THE DAYS
2. NOT BEING ABLE TO STOP OR CONTROL WORRYING: MORE THAN HALF THE DAYS

## 2018-05-15 ASSESSMENT — PATIENT HEALTH QUESTIONNAIRE - PHQ9
10. IF YOU CHECKED OFF ANY PROBLEMS, HOW DIFFICULT HAVE THESE PROBLEMS MADE IT FOR YOU TO DO YOUR WORK, TAKE CARE OF THINGS AT HOME, OR GET ALONG WITH OTHER PEOPLE: EXTREMELY DIFFICULT
SUM OF ALL RESPONSES TO PHQ QUESTIONS 1-9: 23
SUM OF ALL RESPONSES TO PHQ QUESTIONS 1-9: 23

## 2018-05-15 NOTE — NURSING NOTE
"No chief complaint on file.      Initial /68 (BP Location: Right arm, Patient Position: Chair, Cuff Size: Adult Large)  Pulse 92  Wt 183 lb 3.2 oz (83.1 kg)  BMI 33.51 kg/m2 Estimated body mass index is 33.51 kg/(m^2) as calculated from the following:    Height as of 1/26/18: 5' 2\" (1.575 m).    Weight as of this encounter: 183 lb 3.2 oz (83.1 kg).      Health Maintenance that is potentially due pending provider review:  NONE    Troy Osborne MA  10:03 AM 5/15/2018  .          "

## 2018-05-15 NOTE — MR AVS SNAPSHOT
After Visit Summary   5/15/2018    Madelyn Quintana    MRN: [id number]           Patient Information     Date Of Birth          1995        Visit Information        Provider Department      5/15/2018 9:40 AM Honey Foley APRN CNP [facility]        Today's Diagnoses     Major depressive disorder, recurrent episode, mild (H)    -  1    Panic disorder with agoraphobia        Generalized anxiety disorder          Care Instructions    Wean down on Celexa - do 1/2 tab every other day this week - take Wednesday and Friday, start Zoloft on Saturday     Start Zoloft at 1 tab for 1 week, then 1.5 tabs     [facility] with [facility] for [facility]ry and med management and counseling     [facility]  ([place]) -- [phone]    Follow up in 1 month or sooner if needed, reach out to support people if needed                Follow-ups after your visit        Who to contact     If you have questions or need follow up information about today's clinic visit or your schedule please contact [facility] directly at [phone].  Normal or non-critical lab and imaging results will be communicated to you by [employer]ter[employer], letter or phone within 4 business days after the clinic has received the results. If you do not hear from us within 7 days, please contact the clinic through [employer] or phone. If you have a critical or abnormal lab result, we will notify you by phone as soon as possible.  Submit refill requests through [employer] or call your pharmacy and they will forward the refill request to us. Please allow 3 business days for your refill to be completed.          Additional Information About Your Visit        Spotter[employer] Information     [employer] gives you secure access to your electronic health record. If you see a primary care provider, you can also send messages to your care team and make appointments. If you have questions, please call your primary care clinic.  If  you do not have a primary care provider, please call [phone] and they will assist you.        Care EveryWhere ID     This is your Care EveryWhere ID. This could be used by other organizations to access your [facility] medical records  [id number]        Your Vitals Were     Pulse BMI (Body Mass Index)                92 33.51 kg/m2           Blood Pressure from Last 3 Encounters:   05/15/18 110/68   03/14/18 120/68   01/26/18 110/60    Weight from Last 3 Encounters:   05/15/18 183 lb 3.2 oz (83.1 kg)   03/14/18 175 lb 12.8 oz (79.7 kg)   01/26/18 173 lb (78.5 kg)              Today, you had the following     No orders found for display         Today's Medication Changes          These changes are accurate as of 5/15/18 10:24 AM.  If you have any questions, ask your nurse or doctor.               Start taking these medicines.        Dose/Directions    sertraline 100 MG tablet   Commonly known as:  ZOLOFT   Used for:  Major depressive disorder, recurrent episode, mild (H), Panic disorder with agoraphobia, Generalized anxiety disorder   Started by:  Honey Foley APRN CNP        Dose:  150 mg   Take 1.5 tablets (150 mg) by mouth daily   Quantity:  90 tablet   Refills:  1         Stop taking these medicines if you haven't already. Please contact your care team if you have questions.     citalopram 20 MG tablet   Commonly known as:  celeXA   Stopped by:  Honey Foley APRN CNP                Where to get your medicines      These medications were sent to [facility] [place] [id number]     Phone:  [phone]     sertraline 100 MG tablet                Primary Care Provider Office Phone # Fax #    NATHANAEL Medeiros [phone]       [address] [address]        Equal Access to Services     JAY SALAZAR AH: Asha Ramsey, peter cavazos, yamilex  kristi russellin hayaan adeeg kharash la'aan ah. Taya [facility] [phone].    ATENCIÓN: Si arsenio willis, tiene a abdullahi disposición servicios gratuitos de asistencia lingüística. Mary al [phone].    We comply with applicable federal civil rights laws and [place] laws. We do not discriminate on the basis of race, color, national origin, age, disability, sex, sexual orientation, or gender identity.            Thank you!     Thank you for choosing [facility]  for your care. Our goal is always to provide you with excellent care. Hearing back from our patients is one way we can continue to improve our services. Please take a few minutes to complete the written survey that you may receive in the mail after your visit with us. Thank you!             Your Updated Medication List - Protect others around you: Learn how to safely use, store and throw away your medicines at www.disposemymeds.org.          This list is accurate as of 5/15/18 10:24 AM.  Always use your most recent med list.                   Brand Name Dispense Instructions for use Diagnosis    fluocinonide 0.05 % cream    LIDEX    60 g    Apply sparingly to affected area twice daily as needed.  Do not apply to face.    Rash       fluticasone 50 MCG/ACT spray    FLONASE    1 Bottle    Spray 2 sprays into both nostrils daily    Acute rhinitis, unspecified type, Cough       hydrOXYzine 50 MG capsule    VISTARIL    60 capsule    Take 1-2 capsules ( mg) at bedtime as needed for sleep.    Sleep disturbance, Generalized anxiety disorder       LORazepam 1 MG tablet    ATIVAN    20 tablet    Take 1/2 - 1 tab as needed for panic up to 3 times a week PRN    Panic disorder with agoraphobia       sertraline 100 MG tablet    ZOLOFT    90 tablet    Take 1.5 tablets (150 mg) by mouth daily    Major depressive disorder, recurrent episode, mild (H), Panic disorder with agoraphobia, Generalized anxiety disorder       TRI-LINYAH  0.18/0.215/0.25 MG-35 MCG per tablet   Generic drug:  norgestim-eth estrad triphasic     84 tablet    TAKE ONE TABLET BY MOUTH EVERY DAY    Encounter for initial prescription of contraceptive pills       VENTOLIN  (90 Base) MCG/ACT Inhaler   Generic drug:  albuterol     18 g    INHALE 2 PUFFS INTO THE LUNGS EVERY 6 HOURS AS NEEDED FOR SHORTNESS OF BREATH, DIFFICULTY BREATHING OR WHEEZING.    Acute bronchitis with symptoms > 10 days

## 2018-05-15 NOTE — PATIENT INSTRUCTIONS
Wean down on Celexa - do 1/2 tab every other day this week - take Wednesday and Friday, start Zoloft on Saturday     Start Zoloft at 1 tab for 1 week, then 1.5 tabs     [facility] with [facility] for [facility]ry and med management and counseling     [facility]  ([place]) -- [phone]    Follow up in 1 month or sooner if needed, reach out to support people if needed

## 2018-05-15 NOTE — PROGRESS NOTES
SUBJECTIVE:   Madelyn Quintana is a 22 year old female who presents to clinic today for the following health issues:      Depression and Anxiety Follow-Up    Status since last visit: Worsened since starting the Citalopram    Other associated symptoms:Very fatigued, no energy to do anything    Complicating factors:     Significant life event: No     Current substance abuse: None    Noticed worsening since starting Celexa  Can't get out of bed, wants to just sleep and lay around. Not been going out with friends    Hasn't been exercising, eats generally healthy otherwise   Saw psychiatry - was started on Trazodone and took for a couple of days - did not like the way it made her feel so stopped  Catches herself falling asleep here and there  Felt better on the Zoloft     PHQ-9 1/26/2018 3/16/2018 5/15/2018   Total Score 12 13 23   Q9: Suicide Ideation Several days Several days Several days     VICTORIA-7 SCORE 1/26/2018 3/16/2018 5/15/2018   Total Score 8 (mild anxiety) - 12 (moderate anxiety)   Total Score 8 14 12     In the past two weeks have you had thoughts of suicide or self-harm?  Yes, thinks she would be better off  In the past two weeks have you thought of a plan or intent to harm yourself? Yes - just thinks about cutting, but hasn't   In the past two weeks have you acted on these thoughts in any way?  No. Has not cut since prior to last visit   Do you have concerns about your personal safety or the safety of others?   No  PHQ-9  English  PHQ-9   Any Language  VICTORIA-7  Suicide Assessment Five-step Evaluation and Treatment (SAFE-T)          Problem list and histories reviewed & adjusted, as indicated.  Additional history: as documented    Patient Active Problem List   Diagnosis     Labial infection     CARDIOVASCULAR SCREENING; LDL GOAL LESS THAN 160     Generalized anxiety disorder     Major depressive disorder, recurrent episode, mild (H)     Panic disorder with agoraphobia     Vitamin D deficiency     History  reviewed. No pertinent surgical history.    Social History   Substance Use Topics     Smoking status: Current Every Day Smoker     Smokeless tobacco: Never Used      Comment: smoking in house     Alcohol use Yes      Comment: during past week.     Family History   Problem Relation Age of Onset     Unknown/Adopted Paternal Grandmother      Unknown/Adopted Paternal Grandfather      Unknown/Adopted Maternal Grandfather          Current Outpatient Prescriptions   Medication Sig Dispense Refill     fluocinonide (LIDEX) 0.05 % cream Apply sparingly to affected area twice daily as needed.  Do not apply to face. 60 g 1     hydrOXYzine (VISTARIL) 50 MG capsule Take 1-2 capsules ( mg) at bedtime as needed for sleep. 60 capsule 3     LORazepam (ATIVAN) 1 MG tablet Take 1/2 - 1 tab as needed for panic up to 3 times a week PRN 20 tablet 3     norgestim-eth estrad triphasic (TRI-LINYAH) 0.18/0.215/0.25 MG-35 MCG per tablet Take 1 tablet by mouth daily 84 tablet 3     sertraline (ZOLOFT) 100 MG tablet Take 1.5 tablets (150 mg) by mouth daily 90 tablet 1     VENTOLIN  (90 BASE) MCG/ACT Inhaler INHALE 2 PUFFS INTO THE LUNGS EVERY 6 HOURS AS NEEDED FOR SHORTNESS OF BREATH, DIFFICULTY BREATHING OR WHEEZING. 18 g 1     fluticasone (FLONASE) 50 MCG/ACT spray Spray 2 sprays into both nostrils daily (Patient not taking: Reported on 3/14/2018) 1 Bottle 3     Allergies   Allergen Reactions     Lactose Diarrhea     Intolerance       Reviewed and updated as needed this visit by clinical staff  Tobacco  Allergies  Meds  Problems  Med Hx  Surg Hx  Fam Hx  Soc Hx        Reviewed and updated as needed this visit by Provider  Tobacco  Allergies  Meds  Problems  Med Hx  Surg Hx  Fam Hx  Soc Hx          ROS:  Constitutional, HEENT, cardiovascular, pulmonary, gi and gu systems are negative, except as otherwise noted.    OBJECTIVE:     /68 (BP Location: Right arm, Patient Position: Chair, Cuff Size: Adult Large)   Pulse 92  Wt 183 lb 3.2 oz (83.1 kg)  BMI 33.51 kg/m2  Body mass index is 33.51 kg/(m^2).  GENERAL APPEARANCE: healthy, alert and no distress  EYES: Eyes grossly normal to inspection, PERRL and conjunctivae and sclerae normal  HENT: ear canals and TM's normal and nose and mouth without ulcers or lesions  NECK: no adenopathy, no asymmetry, masses, or scars and thyroid normal to palpation  RESP: lungs clear to auscultation - no rales, rhonchi or wheezes  CV: regular rates and rhythm, normal S1 S2, no S3 or S4 and no murmur, click or rub  ABDOMEN: soft, nontender, without hepatosplenomegaly or masses and bowel sounds normal  MS: extremities normal- no gross deformities noted  SKIN: no suspicious lesions or rashes  NEURO: Normal strength and tone, mentation intact and speech normal  PSYCH: mentation appears normal and affect normal/bright    Diagnostic Test Results:  Results for orders placed or performed in visit on 05/15/18   TSH with free T4 reflex   Result Value Ref Range    TSH 0.66 0.40 - 4.00 mU/L   CBC with platelets   Result Value Ref Range    WBC 6.2 4.0 - 11.0 10e9/L    RBC Count 4.74 3.8 - 5.2 10e12/L    Hemoglobin 13.9 11.7 - 15.7 g/dL    Hematocrit 41.0 35.0 - 47.0 %    MCV 87 78 - 100 fl    MCH 29.3 26.5 - 33.0 pg    MCHC 33.9 31.5 - 36.5 g/dL    RDW 12.8 10.0 - 15.0 %    Platelet Count 291 150 - 450 10e9/L   Vitamin D Deficiency   Result Value Ref Range    Vitamin D Deficiency screening 23 20 - 75 ug/L   Comprehensive metabolic panel (BMP + Alb, Alk Phos, ALT, AST, Total. Bili, TP)   Result Value Ref Range    Sodium 139 133 - 144 mmol/L    Potassium 4.1 3.4 - 5.3 mmol/L    Chloride 107 94 - 109 mmol/L    Carbon Dioxide 22 20 - 32 mmol/L    Anion Gap 10 3 - 14 mmol/L    Glucose 85 70 - 99 mg/dL    Urea Nitrogen 12 7 - 30 mg/dL    Creatinine 0.71 0.52 - 1.04 mg/dL    GFR Estimate >90 >60 mL/min/1.7m2    GFR Estimate If Black >90 >60 mL/min/1.7m2    Calcium 9.2 8.5 - 10.1 mg/dL    Bilirubin Total 0.2 0.2  - 1.3 mg/dL    Albumin 3.7 3.4 - 5.0 g/dL    Protein Total 7.6 6.8 - 8.8 g/dL    Alkaline Phosphatase 92 40 - 150 U/L    ALT 26 0 - 50 U/L    AST 24 0 - 45 U/L         ASSESSMENT/PLAN:     1. Major depressive disorder, recurrent episode, mild (H)  Patient still feeling down, did see psychiatry and was not impressed with them and doesn't want to go back, not sure exactly where she went. Started on Celexa as didn't feel Zoloft was effective. Patient complaining of more fatigue and depression on Celexa and felt better on Zoloft. Patient still having thoughts she would be better off and thinks about cutting as she has in the past, but hasn't since before last office visit. Giggles when asked about this. Patient to schedule with [facility], needs to see psychiatry and counseling. Patient to follow up with me in 1 month. Provider feels patient is safe to go home.  - sertraline (ZOLOFT) 100 MG tablet; Take 1.5 tablets (150 mg) by mouth daily  Dispense: 90 tablet; Refill: 1    2. Panic disorder with agoraphobia  Medication change   - sertraline (ZOLOFT) 100 MG tablet; Take 1.5 tablets (150 mg) by mouth daily  Dispense: 90 tablet; Refill: 1    3. Generalized anxiety disorder  Medication change   - sertraline (ZOLOFT) 100 MG tablet; Take 1.5 tablets (150 mg) by mouth daily  Dispense: 90 tablet; Refill: 1    4. Fatigue, unspecified type  Patient feeling more fatigued, could be Celexa, however should rule out other causes   - TSH with free T4 reflex  - CBC with platelets  - Vitamin D Deficiency  - Comprehensive metabolic panel (BMP + Alb, Alk Phos, ALT, AST, Total. Bili, TP)    5. Encounter for initial prescription of contraceptive pills    - norgestim-eth estrad triphasic (TRI-LINYAH) 0.18/0.215/0.25 MG-35 MCG per tablet; Take 1 tablet by mouth daily  Dispense: 84 tablet; Refill: 3    Patient Instructions   Wean down on Celexa - do 1/2 tab every other day this week - take Wednesday and Friday, start Zoloft on  Saturday     Start Zoloft at 1 tab for 1 week, then 1.5 tabs     Schedule with [facility] for psychiatry and med management and counseling     [facility] ([place]) -- [phone]    Follow up in 1 month or sooner if needed, reach out to support people if needed            NATHANAEL Shaikh [facility]

## 2018-05-16 LAB — DEPRECATED CALCIDIOL+CALCIFEROL SERPL-MC: 23 UG/L (ref 20–75)

## 2018-05-16 ASSESSMENT — ANXIETY QUESTIONNAIRES: GAD7 TOTAL SCORE: 12

## 2018-05-16 ASSESSMENT — PATIENT HEALTH QUESTIONNAIRE - PHQ9: SUM OF ALL RESPONSES TO PHQ QUESTIONS 1-9: 23

## 2018-06-19 ENCOUNTER — MYC MEDICAL ADVICE (OUTPATIENT)
Dept: FAMILY MEDICINE | Facility: CLINIC | Age: 23
End: 2018-06-19

## 2018-06-19 NOTE — TELEPHONE ENCOUNTER
Panel Management Review      Patient has the following on her problem list:     Depression / Dysthymia review    Measure:  Needs PHQ-9 score of 4 or less during index window.  Administer PHQ-9 and if score is 5 or more, send encounter to provider for next steps.    5 - 7 month window range: 5/26/2018-9/26/2018    PHQ-9 SCORE 1/26/2018 3/16/2018 5/15/2018   Total Score MyChart 12 (Moderate depression) - 23 (Severe depression)   Total Score 12 13 23       If PHQ-9 recheck is 5 or more, route to provider for next steps.    Patient is due for:  PHQ9      Composite cancer screening  Chart review shows that this patient is due/due soon for the following None  Summary:    Patient is due/failing the following:   PHQ9    Action needed:   Patient needs office visit for depression. and Patient needs to do PHQ9.    Type of outreach:    Sent igobubblehart message.    Questions for provider review:    None                                                                                                                                    dw     Chart routed to Care Team .

## 2018-07-18 ENCOUNTER — OFFICE VISIT (OUTPATIENT)
Dept: FAMILY MEDICINE | Facility: CLINIC | Age: 23
End: 2018-07-18
Payer: COMMERCIAL

## 2018-07-18 VITALS
TEMPERATURE: 97.3 F | HEART RATE: 88 BPM | DIASTOLIC BLOOD PRESSURE: 62 MMHG | BODY MASS INDEX: 34.24 KG/M2 | SYSTOLIC BLOOD PRESSURE: 108 MMHG | WEIGHT: 187.2 LBS

## 2018-07-18 DIAGNOSIS — F41.9 ANXIETY: Primary | ICD-10-CM

## 2018-07-18 DIAGNOSIS — F40.01 PANIC DISORDER WITH AGORAPHOBIA: ICD-10-CM

## 2018-07-18 PROCEDURE — 99213 OFFICE O/P EST LOW 20 MIN: CPT | Performed by: PHYSICIAN ASSISTANT

## 2018-07-18 RX ORDER — TRAZODONE HYDROCHLORIDE 50 MG/1
TABLET, FILM COATED ORAL
Refills: 0 | COMMUNITY
Start: 2018-03-22 | End: 2020-11-02

## 2018-07-18 ASSESSMENT — ENCOUNTER SYMPTOMS
SENSORY CHANGE: 0
MYALGIAS: 0
SEIZURES: 0
SPUTUM PRODUCTION: 0
NECK PAIN: 0
DOUBLE VISION: 0
VOMITING: 0
WEIGHT LOSS: 0
EYE PAIN: 0
HEMOPTYSIS: 0
ABDOMINAL PAIN: 0
NEUROLOGICAL NEGATIVE: 1
FEVER: 0
HEADACHES: 0
BLOOD IN STOOL: 0
FOCAL WEAKNESS: 0
NAUSEA: 0
BACK PAIN: 0
HEARTBURN: 0
EYE DISCHARGE: 0
WEAKNESS: 0
WHEEZING: 0
INSOMNIA: 0
CONSTIPATION: 0
BLURRED VISION: 0
DEPRESSION: 0
FREQUENCY: 0
PHOTOPHOBIA: 0
TINGLING: 0
PALPITATIONS: 0
DIAPHORESIS: 0
DIARRHEA: 0
SHORTNESS OF BREATH: 0
NERVOUS/ANXIOUS: 1
HALLUCINATIONS: 0
DYSURIA: 0
ORTHOPNEA: 0
LOSS OF CONSCIOUSNESS: 0
SORE THROAT: 0
EYE REDNESS: 0
DIZZINESS: 0
COUGH: 0

## 2018-07-18 ASSESSMENT — LIFESTYLE VARIABLES: SUBSTANCE_ABUSE: 0

## 2018-07-18 NOTE — LETTER
[facility]  [address] [place] [address]  Phone: [phone]  Fax: [phone]    July 18, 2018        Madelyn Quintana  [address]   [facility] [address]          To whom it may concern:    RE: Madelyn Quintana    Patient was seen and treated today at our clinic.She would benefit from a  pet.    Please contact me for questions or concerns.      Sincerely,        Austyn Uriarte PA-C

## 2018-07-18 NOTE — PROGRESS NOTES
HPI    SUBJECTIVE:   Madelyn Quintana is a 23 year old female who presents to clinic today for anxiety and depression and she would like to have a  pet.  She is a note for her apartment complex      Would like a letter for a  animal     Problem list and histories reviewed & adjusted, as indicated.  Additional history: as documented    Patient Active Problem List   Diagnosis     Labial infection     CARDIOVASCULAR SCREENING; LDL GOAL LESS THAN 160     Generalized anxiety disorder     Major depressive disorder, recurrent episode, mild (H)     Panic disorder with agoraphobia     Vitamin D deficiency     History reviewed. No pertinent surgical history.    Social History   Substance Use Topics     Smoking status: Current Every Day Smoker     Smokeless tobacco: Never Used      Comment: smoking in house     Alcohol use Yes      Comment: during past week.     Family History   Problem Relation Age of Onset     Unknown/Adopted Paternal Grandmother      Unknown/Adopted Paternal Grandfather      Unknown/Adopted Maternal Grandfather          Current Outpatient Prescriptions   Medication Sig Dispense Refill     hydrOXYzine (VISTARIL) 50 MG capsule Take 1-2 capsules ( mg) at bedtime as needed for sleep. 60 capsule 3     LORazepam (ATIVAN) 1 MG tablet Take 1/2 - 1 tab as needed for panic up to 3 times a week PRN 20 tablet 3     norgestim-eth estrad triphasic (TRI-LINYAH) 0.18/0.215/0.25 MG-35 MCG per tablet Take 1 tablet by mouth daily 84 tablet 3     sertraline (ZOLOFT) 100 MG tablet Take 1.5 tablets (150 mg) by mouth daily 90 tablet 1     VENTOLIN  (90 BASE) MCG/ACT Inhaler INHALE 2 PUFFS INTO THE LUNGS EVERY 6 HOURS AS NEEDED FOR SHORTNESS OF BREATH, DIFFICULTY BREATHING OR WHEEZING. 18 g 1     fluocinonide (LIDEX) 0.05 % cream Apply sparingly to affected area twice daily as needed.  Do not apply to face. (Patient not taking: Reported on 7/18/2018) 60 g 1     fluticasone (FLONASE) 50 MCG/ACT spray  Spray 2 sprays into both nostrils daily (Patient not taking: Reported on 3/14/2018) 1 Bottle 3     traZODone (DESYREL) 50 MG tablet MAY TAKE 25MG-100MG PO QHS PRN  0     Allergies   Allergen Reactions     Lactose Diarrhea     Intolerance     Labs reviewed in EPIC    Reviewed and updated as needed this visit by clinical staff       Reviewed and updated as needed this visit by Provider       Review of Systems   Constitutional: Negative for diaphoresis, fever, malaise/fatigue and weight loss.   HENT: Negative for congestion, ear discharge, ear pain, hearing loss, nosebleeds and sore throat.    Eyes: Negative for blurred vision, double vision, photophobia, pain, discharge and redness.   Respiratory: Negative for cough, hemoptysis, sputum production, shortness of breath and wheezing.    Cardiovascular: Negative for chest pain, palpitations, orthopnea and leg swelling.   Gastrointestinal: Negative for abdominal pain, blood in stool, constipation, diarrhea, heartburn, melena, nausea and vomiting.   Genitourinary: Negative.  Negative for dysuria, frequency and urgency.   Musculoskeletal: Negative for back pain, joint pain, myalgias and neck pain.   Skin: Negative for itching and rash.   Neurological: Negative.  Negative for dizziness, tingling, sensory change, focal weakness, seizures, loss of consciousness, weakness and headaches.   Endo/Heme/Allergies: Negative.    Psychiatric/Behavioral: Negative for depression, hallucinations, substance abuse and suicidal ideas. The patient is nervous/anxious. The patient does not have insomnia.          Physical Exam   Constitutional: She is oriented to person, place, and time and well-developed, well-nourished, and in no distress. No distress.   HENT:   Head: Normocephalic and atraumatic.   Right Ear: External ear normal.   Left Ear: External ear normal.   Nose: Nose normal.   Eyes: Conjunctivae and EOM are normal. Pupils are equal, round, and reactive to light. Right eye exhibits no  discharge. Left eye exhibits no discharge. No scleral icterus.   Neck: Normal range of motion. Neck supple. No JVD present. No tracheal deviation present. No thyromegaly present.   Cardiovascular: Normal rate, regular rhythm, normal heart sounds and intact distal pulses.  Exam reveals no gallop and no friction rub.    No murmur heard.  Pulmonary/Chest: Effort normal and breath sounds normal. No stridor. No respiratory distress. She has no wheezes. She has no rales. She exhibits no tenderness.   Abdominal: Soft. Bowel sounds are normal. She exhibits no distension and no mass. There is no tenderness. There is no rebound and no guarding.   Musculoskeletal: Normal range of motion. She exhibits no edema or tenderness.   Lymphadenopathy:     She has no cervical adenopathy.   Neurological: She is alert and oriented to person, place, and time. She has normal reflexes. No cranial nerve deficit. She exhibits normal muscle tone. Gait normal.   Skin: Skin is warm and dry. No rash noted. She is not diaphoretic. No erythema. No pallor.   Psychiatric: Memory, affect and judgment normal. Her mood appears anxious. She expresses no homicidal and no suicidal ideation. She expresses no suicidal plans and no homicidal plans.         (F41.9) Anxiety  (primary encounter diagnosis)  Comment:   Plan:    Follow up as needed

## 2018-07-18 NOTE — MR AVS SNAPSHOT
After Visit Summary   7/18/2018    Madelyn Quintana    MRN: [id number]           Patient Information     Date Of Birth          1995        Visit Information        Provider Department      7/18/2018 11:00 AM Austyn Uriarte PA-C [facility]        Today's Diagnoses     Anxiety    -  1       Follow-ups after your visit        Follow-up notes from your care team     Return if symptoms worsen or fail to improve.      Who to contact     If you have questions or need follow up information about today's clinic visit or your schedule please contact [facility] directly at [phone].  Normal or non-critical lab and imaging results will be communicated to you by MyChart, letter or phone within 4 business days after the clinic has received the results. If you do not hear from us within 7 days, please contact the clinic through OnlineMarkethart or phone. If you have a critical or abnormal lab result, we will notify you by phone as soon as possible.  Submit refill requests through Paperlinks or call your pharmacy and they will forward the refill request to us. Please allow 3 business days for your refill to be completed.          Additional Information About Your Visit        MyChart Information     Paperlinks gives you secure access to your electronic health record. If you see a primary care provider, you can also send messages to your care team and make appointments. If you have questions, please call your primary care clinic.  If you do not have a primary care provider, please call [phone] and they will assist you.        Care EveryWhere ID     This is your Care EveryWhere ID. This could be used by other organizations to access your [place] medical records  [id number]        Your Vitals Were     Pulse Temperature BMI (Body Mass Index)             88 97.3  F (36.3  C) (Tympanic) 34.24 kg/m2          Blood Pressure from Last 3 Encounters:   07/18/18 108/62   05/15/18 110/68    03/14/18 120/68    Weight from Last 3 Encounters:   07/18/18 187 lb 3.2 oz (84.9 kg)   05/15/18 183 lb 3.2 oz (83.1 kg)   03/14/18 175 lb 12.8 oz (79.7 kg)              Today, you had the following     No orders found for display       Primary Care Provider Office Phone # Fax #    Honey Foley, APRN [facility] [phone]       [facility] [address] [address]        Equal Access to Services     JAY SALAZAR : Hadii aad ku hadasho Soomaali, waaxda luqadaha, qaybta kaalmada aderianyacher, kristi magana . So [facility] [phone].    ATENCIÓN: Si habla español, tiene a abdullahi disposición servicios gratuitos de asistencia lingüística. Mary al [phone].    We comply with applicable federal civil rights laws and [place] laws. We do not discriminate on the basis of race, color, national origin, age, disability, sex, sexual orientation, or gender identity.            Thank you!     Thank you for choosing [facility]  for your care. Our goal is always to provide you with excellent care. Hearing back from our patients is one way we can continue to improve our services. Please take a few minutes to complete the written survey that you may receive in the mail after your visit with us. Thank you!             Your Updated Medication List - Protect others around you: Learn how to safely use, store and throw away your medicines at www.disposemymeds.org.          This list is accurate as of 7/18/18 12:35 PM.  Always use your most recent med list.                   Brand Name Dispense Instructions for use Diagnosis    fluocinonide 0.05 % cream    LIDEX    60 g    Apply sparingly to affected area twice daily as needed.  Do not apply to face.    Rash       fluticasone 50 MCG/ACT spray    FLONASE    1 Bottle    Spray 2 sprays into both nostrils daily    Acute rhinitis, unspecified type, Cough       hydrOXYzine 50 MG capsule    VISTARIL    60 capsule     Take 1-2 capsules ( mg) at bedtime as needed for sleep.    Sleep disturbance, Generalized anxiety disorder       LORazepam 1 MG tablet    ATIVAN    20 tablet    Take 1/2 - 1 tab as needed for panic up to 3 times a week PRN    Panic disorder with agoraphobia       norgestim-eth estrad triphasic 0.18/0.215/0.25 MG-35 MCG per tablet    TRI-LINYAH    84 tablet    Take 1 tablet by mouth daily    Encounter for initial prescription of contraceptive pills       sertraline 100 MG tablet    ZOLOFT    90 tablet    Take 1.5 tablets (150 mg) by mouth daily    Major depressive disorder, recurrent episode, mild (H), Panic disorder with agoraphobia, Generalized anxiety disorder       traZODone 50 MG tablet    DESYREL     MAY TAKE 25MG-100MG PO QHS PRN        VENTOLIN  (90 Base) MCG/ACT Inhaler   Generic drug:  albuterol     18 g    INHALE 2 PUFFS INTO THE LUNGS EVERY 6 HOURS AS NEEDED FOR SHORTNESS OF BREATH, DIFFICULTY BREATHING OR WHEEZING.    Acute bronchitis with symptoms > 10 days

## 2018-07-18 NOTE — TELEPHONE ENCOUNTER
Controlled Substance Refill Request for LORazepam (ATIVAN) 1 MG tablet  Problem List Complete:  Yes, Panic disorder with agoraphobia [F40.01]    checked in past 3 months?  No, route to RN     PHQ-9 SCORE 1/26/2018 3/16/2018 5/15/2018   Total Score MyChart 12 (Moderate depression) - 23 (Severe depression)   Total Score 12 13 23     VICTORIA-7 SCORE 1/26/2018 3/16/2018 5/15/2018   Total Score 8 (mild anxiety) - 12 (moderate anxiety)   Total Score 8 14 12       Last Written Prescription Date:  10/31/17  Last Fill Quantity: 20,  # refills: 1   Last office visit: 7/18/2018 with prescribing provider:     Future Office Visit:

## 2018-07-18 NOTE — NURSING NOTE
"Chief Complaint   Patient presents with     Letter Request       Initial /62 (BP Location: Right arm, Patient Position: Sitting, Cuff Size: Adult Large)  Pulse 88  Temp 97.3  F (36.3  C) (Tympanic)  Wt 187 lb 3.2 oz (84.9 kg)  BMI 34.24 kg/m2 Estimated body mass index is 34.24 kg/(m^2) as calculated from the following:    Height as of 1/26/18: 5' 2\" (1.575 m).    Weight as of this encounter: 187 lb 3.2 oz (84.9 kg).      Health Maintenance that is potentially due pending provider review:  NONE    n/a    Is there anyone who you would like to be able to receive your results? No  If yes have patient fill out LORETTA    Marleni FRIEDMAN CMA    "

## 2018-07-19 RX ORDER — LORAZEPAM 1 MG/1
TABLET ORAL
Qty: 20 TABLET | Refills: 3 | Status: SHIPPED | OUTPATIENT
Start: 2018-07-19 | End: 2018-10-23

## 2018-07-19 NOTE — TELEPHONE ENCOUNTER
Routing refill request to provider for review/approval because:  Drug not on the [facility] refill protocol   Poonam CAMPBELL RN

## 2018-09-17 ENCOUNTER — TELEPHONE (OUTPATIENT)
Dept: FAMILY MEDICINE | Facility: CLINIC | Age: 23
End: 2018-09-17

## 2018-09-17 ENCOUNTER — MYC MEDICAL ADVICE (OUTPATIENT)
Dept: FAMILY MEDICINE | Facility: CLINIC | Age: 23
End: 2018-09-17

## 2018-09-17 DIAGNOSIS — Z11.3 SCREEN FOR STD (SEXUALLY TRANSMITTED DISEASE): Primary | ICD-10-CM

## 2018-09-17 NOTE — TELEPHONE ENCOUNTER
Panel Management Review      Patient has the following on her problem list:     Depression / Dysthymia review    Measure:  Needs PHQ-9 score of 4 or less during index window.  Administer PHQ-9 and if score is 5 or more, send encounter to provider for next steps.    5 - 7 month window range: 5/26/2018-9/26/2018    PHQ-9 SCORE 1/26/2018 3/16/2018 5/15/2018   Total Score MyChart 12 (Moderate depression) - 23 (Severe depression)   Total Score 12 13 23       If PHQ-9 recheck is 5 or more, route to provider for next steps.    Patient is due for:  PHQ9      Composite cancer screening  Chart review shows that this patient is due/due soon for the following chlamydia screening  Summary:    Patient is due/failing the following:   PHQ9    Action needed:   Patient needs to do PHQ9. and Patient needs  lab only appointment    Type of outreach:    Sent OffiSynchart message.    Questions for provider review:    None                                                                                                                                    Sandee SUE CMA     Chart routed to Care Team .

## 2018-10-09 ENCOUNTER — HOSPITAL ENCOUNTER (EMERGENCY)
Facility: CLINIC | Age: 23
Discharge: HOME | End: 2018-10-09
Attending: NURSE PRACTITIONER | Admitting: NURSE PRACTITIONER
Payer: COMMERCIAL

## 2018-10-09 VITALS
BODY MASS INDEX: 29.02 KG/M2 | DIASTOLIC BLOOD PRESSURE: 81 MMHG | TEMPERATURE: 99 F | OXYGEN SATURATION: 95 % | WEIGHT: 170 LBS | SYSTOLIC BLOOD PRESSURE: 120 MMHG | HEIGHT: 64 IN | RESPIRATION RATE: 16 BRPM

## 2018-10-09 DIAGNOSIS — J06.9 VIRAL URI WITH COUGH: ICD-10-CM

## 2018-10-09 DIAGNOSIS — R06.2 WHEEZING: ICD-10-CM

## 2018-10-09 DIAGNOSIS — J20.9 ACUTE BRONCHITIS WITH SYMPTOMS > 10 DAYS: ICD-10-CM

## 2018-10-09 PROCEDURE — G0463 HOSPITAL OUTPT CLINIC VISIT: HCPCS | Performed by: NURSE PRACTITIONER

## 2018-10-09 PROCEDURE — 99213 OFFICE O/P EST LOW 20 MIN: CPT | Mod: Z6 | Performed by: NURSE PRACTITIONER

## 2018-10-09 RX ORDER — ALBUTEROL SULFATE 90 UG/1
AEROSOL, METERED RESPIRATORY (INHALATION)
Qty: 18 G | Refills: 1 | Status: SHIPPED | OUTPATIENT
Start: 2018-10-09 | End: 2020-08-24

## 2018-10-09 RX ORDER — PREDNISONE 20 MG/1
TABLET ORAL
Qty: 10 TABLET | Refills: 0 | Status: SHIPPED | OUTPATIENT
Start: 2018-10-09 | End: 2018-11-29

## 2018-10-09 NOTE — LETTER
[facility] EMERGENCY DEPARTMENT  [address] [facility] [address]  [phone]          October 9, 2018    RE:  Madelyn Quintana                                                                                                                                                       [address]   [facility] [address]            To whom it may concern:    Madelyn Quintana was under my professional care for illness. Please excuse her from work.      Sincerely,           NATHANAEL Salazar CNP

## 2018-10-09 NOTE — DISCHARGE INSTRUCTIONS
Viral or Bacterial Bronchitis with Wheezing (Adult)    Bronchitis is an infection of the air passages. It often occurs during a cold and is usually caused by a virus. Symptoms include cough with mucus (phlegm) and low-grade fever. This illness is contagious during the first few days and is spread through the air by coughing and sneezing, or by direct contact (touching the sick person and then touching your own eyes, nose, or mouth).  If there is a lot of inflammation, air flow is restricted. The air passages may also go into spasm, especially if you have asthma. This causes wheezing and difficulty breathing even in people who do not have asthma.  Bronchitis usually lasts 7 to 14 days. The wheezing should improve with treatment during the first week. An inhaler is often prescribed to relax the air passages and stop wheezing. Antibiotics will be prescribed if your doctor thinks there is also a secondary bacterial infection.  Home care    If symptoms are severe, rest at home for the first 2 to 3 days. When you go back to your usual activities, don't let yourself get too tired.    Do not smoke. Also avoid being exposed to secondhand smoke.    You may use over-the-counter medicine to control fever or pain, unless another medicine was prescribed. Note: If you have chronic liver or kidney disease or have ever had a stomach ulcer or gastrointestinal bleeding, talk with your healthcare provider before using these medicines. Also talk to your provider if you are taking medicine to prevent blood clots.) Aspirin should never be given to anyone younger than 18 years of age who is ill with a viral infection or fever. It may cause severe liver or brain damage.    Your appetite may be poor, so a light diet is fine. Avoid dehydration by drinking 6 to 8 glasses of fluids per day (such as water, soft drinks, sports drinks, juices, tea, or soup). Extra fluids will help loosen secretions in the nose and lungs.    Over-the-counter  cough, cold, and sore-throat medicines will not shorten the length of the illness, but they may be helpful to reduce symptoms. (Note: Do not use decongestants if you have high blood pressure.)    If you were given an inhaler, use it exactly as directed. If you need to use it more often than prescribed, your condition may be worsening. If this happens, contact your healthcare provider.    If prescribed, finish all antibiotic medicine, even if you are feeling better after only a few days.  Follow-up care  Follow up with your healthcare provider, or as advised. If you had an X-ray or ECG (electrocardiogram), a specialist will review it. You will be notified of any new findings that may affect your care.  If you are age 65 or older, or if you have a chronic lung disease or condition that affects your immune system, or you smoke, ask your healthcare provider about getting a pneumococcal vaccine and a yearly flu shot (influenza vaccine).  When to seek medical advice  Call your healthcare provider right away if any of these occur:    Fever of 100.4 F (38 C) or higher, or as directed by your healthcare provider    Coughing up increasing amounts of colored sputum    Weakness, drowsiness, headache, facial pain, ear pain, or a stiff neck  Call 911  Call 911 if any of these occur.    Coughing up blood    Worsening weakness, drowsiness, headache, or stiff neck    Increased wheezing not helped with medication, shortness of breath, or pain with breathing  Date Last Reviewed: 9/13/2015 2000-2017 [employer]. [address] [place] [address]. All rights reserved. This information is not intended as a substitute for professional medical care. Always follow your healthcare professional's instructions.

## 2018-10-09 NOTE — ED PROVIDER NOTES
History     Chief Complaint   Patient presents with     Nasal Congestion     x  one week      Vomiting     HPI  Madelyn Quintana is a 23 year old female who presents to urgent care for evaluation of cough, wheezing, nasal congestion, and vomiting.  Symptoms started 1 week ago.  Chest feels tight.  Denies fever or chills. Vomiting after coughing today.  Denies chest pain or shortness of breath.  Patient has been treating her symptoms with albuterol inhaler which she reports has not been helping.    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 No pertinent surgical history.    Family History:    Family History   Problem Relation Age of Onset     Unknown/Adopted Paternal Grandmother      Unknown/Adopted Paternal Grandfather      Unknown/Adopted Maternal Grandfather        Social History:  Marital Status:  Single [1]  Social History   Substance Use Topics     Smoking status: Current Every Day Smoker     Smokeless tobacco: Never Used      Comment: smoking in house     Alcohol use Yes      Comment: during past week.        Medications:      albuterol (VENTOLIN HFA) 108 (90 Base) MCG/ACT inhaler   predniSONE (DELTASONE) 20 MG tablet   fluocinonide (LIDEX) 0.05 % cream   fluticasone (FLONASE) 50 MCG/ACT spray   hydrOXYzine  "(VISTARIL) 50 MG capsule   LORazepam (ATIVAN) 1 MG tablet   norgestim-eth estrad triphasic (TRI-LINYAH) 0.18/0.215/0.25 MG-35 MCG per tablet   sertraline (ZOLOFT) 100 MG tablet   traZODone (DESYREL) 50 MG tablet   [DISCONTINUED] VENTOLIN  (90 BASE) MCG/ACT Inhaler         Review of Systems  As mentioned above in the history present illness. All other systems were reviewed and are negative.    Physical Exam   BP: 120/81  Heart Rate: 85  Temp: 99  F (37.2  C)  Resp: 16  Height: 162.6 cm (5' 4\")  Weight: 77.1 kg (170 lb)  SpO2: 95 %      Physical Exam    GENERAL APPEARANCE: healthy, alert and no distress  EYES: EOMI, conjunctiva clear  HENT: bilateral ear canals clear, intact, and without inflammation. Right TM normal. Left TM normal. Nose normal.  Oropharynx without ulcers, erythema or lesions  NECK: supple, nontender, no lymphadenopathy  RESP: faint expiratory wheeze in the bases, otherwise clear.  CV: regular rates and rhythm, normal S1 S2, no murmur noted      ED Course     ED Course     Procedures             No results found for this or any previous visit (from the past 24 hour(s)).    Medications - No data to display    Assessments & Plan (with Medical Decision Making)   History and exam are clinically consistent with a viral URI with cough, likely bronchitis.  Lung sounds with faint expiratory wheezing in the bases but otherwise clear.  No indication for chest x-ray.  Patient is afebrile, no tachypnea, no tachycardia.  Low suspicion for pneumonia.  Patient was given a prescription for prednisone and refill of albuterol inhaler as well as a spacer to use for her wheezing.  Some reasons to return discussed with patient.  I have reviewed the nursing notes.    I have reviewed the findings, diagnosis, plan and need for follow up with the patient.      New Prescriptions    PREDNISONE (DELTASONE) 20 MG TABLET    Take two tablets (= 40mg) each day for 5 (five) days       Final diagnoses:   Viral URI with cough "   Wheezing       10/9/2018   [facility] EMERGENCY DEPARTMENT     China Durand, NATHANAEL CNP  10/09/18 4716

## 2018-10-09 NOTE — ED AVS SNAPSHOT
[facility] Emergency Department    [address] [address]    Phone:  [phone]    Fax:  [phone]                                       Madelyn Quintana   MRN: [id number]    Department:  [facility] Emergency Department   Date of Visit:  10/9/2018           After Visit Summary Signature Page     I have received my discharge instructions, and my questions have been answered. I have discussed any challenges I see with this plan with the nurse or doctor.    ..........................................................................................................................................  Patient/Patient Representative Signature      ..........................................................................................................................................  Patient Representative Print Name and Relationship to Patient    ..................................................               ................................................  Date                                   Time    ..........................................................................................................................................  Reviewed by Signature/Title    ...................................................              ..............................................  Date                                               Time          22EPIC Rev 08/18

## 2018-10-09 NOTE — ED AVS SNAPSHOT
[facility] Emergency Department    [address] [place] [address]    Phone:  [phone]    Fax:  [phone]                                       Madelyn Quintana   MRN: [id number]    Department:  [facility] Emergency Department   Date of Visit:  10/9/2018           Patient Information     Date Of Birth          1995        Your diagnoses for this visit were:     Viral URI with cough     Wheezing     Acute bronchitis with symptoms > 10 days        You were seen by China Durand APRN CNP.      Follow-up Information     Follow up with Honey Foley APRN CNP.    Specialty:  Nurse Practitioner - Family    Why:  As needed    Contact information:    [facility]  [address] [facility] [address]  [phone]          Discharge Instructions         Viral or Bacterial Bronchitis with Wheezing (Adult)    Bronchitis is an infection of the air passages. It often occurs during a cold and is usually caused by a virus. Symptoms include cough with mucus (phlegm) and low-grade fever. This illness is contagious during the first few days and is spread through the air by coughing and sneezing, or by direct contact (touching the sick person and then touching your own eyes, nose, or mouth).  If there is a lot of inflammation, air flow is restricted. The air passages may also go into spasm, especially if you have asthma. This causes wheezing and difficulty breathing even in people who do not have asthma.  Bronchitis usually lasts 7 to 14 days. The wheezing should improve with treatment during the first week. An inhaler is often prescribed to relax the air passages and stop wheezing. Antibiotics will be prescribed if your doctor thinks there is also a secondary bacterial infection.  Home care    If symptoms are severe, rest at home for the first 2 to 3 days. When you go back to your usual activities, don't let yourself get too tired.    Do not smoke. Also avoid being  exposed to secondhand smoke.    You may use over-the-counter medicine to control fever or pain, unless another medicine was prescribed. Note: If you have chronic liver or kidney disease or have ever had a stomach ulcer or gastrointestinal bleeding, talk with your healthcare provider before using these medicines. Also talk to your provider if you are taking medicine to prevent blood clots.) Aspirin should never be given to anyone younger than 18 years of age who is ill with a viral infection or fever. It may cause severe liver or brain damage.    Your appetite may be poor, so a light diet is fine. Avoid dehydration by drinking 6 to 8 glasses of fluids per day (such as water, soft drinks, sports drinks, juices, tea, or soup). Extra fluids will help loosen secretions in the nose and lungs.    Over-the-counter cough, cold, and sore-throat medicines will not shorten the length of the illness, but they may be helpful to reduce symptoms. (Note: Do not use decongestants if you have high blood pressure.)    If you were given an inhaler, use it exactly as directed. If you need to use it more often than prescribed, your condition may be worsening. If this happens, contact your healthcare provider.    If prescribed, finish all antibiotic medicine, even if you are feeling better after only a few days.  Follow-up care  Follow up with your healthcare provider, or as advised. If you had an X-ray or ECG (electrocardiogram), a specialist will review it. You will be notified of any new findings that may affect your care.  If you are age 65 or older, or if you have a chronic lung disease or condition that affects your immune system, or you smoke, ask your healthcare provider about getting a pneumococcal vaccine and a yearly flu shot (influenza vaccine).  When to seek medical advice  Call your healthcare provider right away if any of these occur:    Fever of 100.4 F (38 C) or higher, or as directed by your healthcare  provider    Coughing up increasing amounts of colored sputum    Weakness, drowsiness, headache, facial pain, ear pain, or a stiff neck  Call 911  Call 911 if any of these occur.    Coughing up blood    Worsening weakness, drowsiness, headache, or stiff neck    Increased wheezing not helped with medication, shortness of breath, or pain with breathing  Date Last Reviewed: 9/13/2015 2000-2017 [employer]. [address] [place] [address], [place] [address]. All rights reserved. This information is not intended as a substitute for professional medical care. Always follow your healthcare professional's instructions.          24 Hour Appointment Hotline       To make an appointment at any [facility], call [phone] ([phone]). If you don't have a family doctor or clinic, we will help you find one. [facility] are conveniently located to serve the needs of you and your family.             Review of your medicines      START taking        Dose / Directions Last dose taken    predniSONE 20 MG tablet   Commonly known as:  DELTASONE   Quantity:  10 tablet        Take two tablets (= 40mg) each day for 5 (five) days   Refills:  0          Our records show that you are taking the medicines listed below. If these are incorrect, please call your family doctor or clinic.        Dose / Directions Last dose taken    albuterol 108 (90 Base) MCG/ACT inhaler   Commonly known as:  VENTOLIN HFA   Quantity:  18 g        INHALE 2 PUFFS INTO THE LUNGS EVERY 6 HOURS AS NEEDED FOR SHORTNESS OF BREATH, DIFFICULTY BREATHING OR WHEEZING.   Refills:  1        fluocinonide 0.05 % cream   Commonly known as:  LIDEX   Quantity:  60 g        Apply sparingly to affected area twice daily as needed.  Do not apply to face.   Refills:  1        fluticasone 50 MCG/ACT spray   Commonly known as:  FLONASE   Dose:  2 spray   Quantity:  1 Bottle        Spray 2 sprays into both nostrils daily   Refills:  3        hydrOXYzine 50 MG  capsule   Commonly known as:  VISTARIL   Quantity:  60 capsule        Take 1-2 capsules ( mg) at bedtime as needed for sleep.   Refills:  3        LORazepam 1 MG tablet   Commonly known as:  ATIVAN   Quantity:  20 tablet        Take 1/2 - 1 tab as needed for panic up to 3 times a week PRN   Refills:  3        norgestim-eth estrad triphasic 0.18/0.215/0.25 MG-35 MCG per tablet   Commonly known as:  TRI-LINYAH   Dose:  1 tablet   Quantity:  84 tablet        Take 1 tablet by mouth daily   Refills:  3        sertraline 100 MG tablet   Commonly known as:  ZOLOFT   Dose:  150 mg   Quantity:  90 tablet        Take 1.5 tablets (150 mg) by mouth daily   Refills:  1        traZODone 50 MG tablet   Commonly known as:  DESYREL        MAY TAKE 25MG-100MG PO QHS PRN   Refills:  0                Prescriptions were sent or printed at these locations (2 Prescriptions)                   [facility] [place] [id number] [facility]   [address] [address]    Telephone:  [phone]   Fax:  [phone]   Hours:                  E-Prescribed (2 of 2)         albuterol (VENTOLIN HFA) 108 (90 Base) MCG/ACT inhaler               predniSONE (DELTASONE) 20 MG tablet                Orders Needing Specimen Collection     None      Pending Results     No orders found from 10/7/2018 to 10/10/2018.            Pending Culture Results     No orders found from 10/7/2018 to 10/10/2018.            Pending Results Instructions     If you had any lab results that were not finalized at the time of your Discharge, you can call the ED Lab Result RN at [phone]. You will be contacted by this team for any positive Lab results or changes in treatment. The nurses are available 7 days a week from 10A to 6:30P.  You can leave a message 24 hours per day and they will return your call.        Test Results From Your Hospital Stay               Thank you for choosing [facility]       Thank you for choosing [facility] for your  care. Our goal is always to provide you with excellent care. Hearing back from our patients is one way we can continue to improve our services. Please take a few minutes to complete the written survey that you may receive in the mail after you visit with us. Thank you!        [facility] Information     [facility] gives you secure access to your electronic health record. If you see a primary care provider, you can also send messages to your care team and make appointments. If you have questions, please call your primary care clinic.  If you do not have a primary care provider, please call [phone] and they will assist you.        Care EveryWhere ID     This is your Care EveryWhere ID. This could be used by other organizations to access your [place] medical records  [id number]        Equal Access to Services     JAY SALAZAR : Asha Ramsey, peter cavazos, yamilex zepeda, kristi aburto. So [facility] [phone].    ATENCIÓN: Si habla español, tiene a abdullahi disposición servicios gratuitos de asistencia lingüística. Llame al [phone].    We comply with applicable federal civil rights laws and [place] laws. We do not discriminate on the basis of race, color, national origin, age, disability, sex, sexual orientation, or gender identity.            After Visit Summary       This is your record. Keep this with you and show to your community pharmacist(s) and doctor(s) at your next visit.

## 2018-10-23 ENCOUNTER — MYC REFILL (OUTPATIENT)
Dept: FAMILY MEDICINE | Facility: CLINIC | Age: 23
End: 2018-10-23

## 2018-10-23 DIAGNOSIS — G47.9 SLEEP DISTURBANCE: ICD-10-CM

## 2018-10-23 DIAGNOSIS — F33.0 MAJOR DEPRESSIVE DISORDER, RECURRENT EPISODE, MILD (H): ICD-10-CM

## 2018-10-23 DIAGNOSIS — F40.01 PANIC DISORDER WITH AGORAPHOBIA: ICD-10-CM

## 2018-10-23 DIAGNOSIS — F41.1 GENERALIZED ANXIETY DISORDER: Primary | ICD-10-CM

## 2018-10-23 DIAGNOSIS — F41.1 GENERALIZED ANXIETY DISORDER: ICD-10-CM

## 2018-10-24 RX ORDER — LORAZEPAM 1 MG/1
TABLET ORAL
Qty: 20 TABLET | Refills: 0 | Status: SHIPPED | OUTPATIENT
Start: 2018-10-24 | End: 2018-12-14

## 2018-10-24 RX ORDER — HYDROXYZINE PAMOATE 50 MG/1
CAPSULE ORAL
Qty: 60 CAPSULE | Refills: 3 | Status: SHIPPED | OUTPATIENT
Start: 2018-10-24 | End: 2024-04-24

## 2018-10-24 NOTE — TELEPHONE ENCOUNTER
Please notify patient of Ativan script and that I would like her to be evaluated for medication management for her depression, anxiety and panic disorder as she is using the Ativan over half the days of the month. We should look into other options.    Thanks  NATHANAEL Khan CNP

## 2018-10-24 NOTE — TELEPHONE ENCOUNTER
Message from [employer]:  Original authorizing provider: Austyn Uriarte PA-C    Madelyn Quintana would like a refill of the following medications:  LORazepam (ATIVAN) 1 MG tablet [Austyn Uriarte PA-C]    Preferred pharmacy: [facility], [place] - [address] [place] [address]    Comment:      Medication renewals requested in this message routed to other providers:  hydrOXYzine (VISTARIL) 50 MG capsule [Elan Guerrero MD]

## 2018-10-24 NOTE — TELEPHONE ENCOUNTER
Message from [employer]:  Original authorizing provider: Elan Guerrero MD    Madelyn Quintana would like a refill of the following medications:  hydrOXYzine (VISTARIL) 50 MG capsule [Elan Guerrero MD]    Preferred pharmacy: [address]    Comment:      Medication renewals requested in this message routed to other providers:  LORazepam (ATIVAN) 1 MG tablet [Austyn Uriarte PA-C]

## 2018-10-26 ENCOUNTER — ALLIED HEALTH/NURSE VISIT (OUTPATIENT)
Dept: FAMILY MEDICINE | Facility: CLINIC | Age: 23
End: 2018-10-26
Payer: COMMERCIAL

## 2018-10-26 DIAGNOSIS — Z23 NEED FOR VACCINATION: ICD-10-CM

## 2018-10-26 DIAGNOSIS — Z23 NEED FOR PROPHYLACTIC VACCINATION AND INOCULATION AGAINST INFLUENZA: ICD-10-CM

## 2018-10-26 PROCEDURE — 90714 TD VACC NO PRESV 7 YRS+ IM: CPT

## 2018-10-26 PROCEDURE — 99207 ZZC NO CHARGE LOS: CPT

## 2018-10-26 PROCEDURE — 90686 IIV4 VACC NO PRSV 0.5 ML IM: CPT

## 2018-10-26 PROCEDURE — 90471 IMMUNIZATION ADMIN: CPT

## 2018-10-26 PROCEDURE — 90472 IMMUNIZATION ADMIN EACH ADD: CPT

## 2018-10-26 NOTE — MR AVS SNAPSHOT
After Visit Summary   10/26/2018    Madelyn Quintana    MRN: [id number]           Patient Information     Date Of Birth          1995        Visit Information        Provider Department      10/26/2018 9:30 AM [place] FP/IM CMA/LPN [facility]        Today's Diagnoses     Need for vaccination        Need for prophylactic vaccination and inoculation against influenza           Follow-ups after your visit        Who to contact     If you have questions or need follow up information about today's clinic visit or your schedule please contact [facility] directly at [phone].  Normal or non-critical lab and imaging results will be communicated to you by BullGuardhart, letter or phone within 4 business days after the clinic has received the results. If you do not hear from us within 7 days, please contact the clinic through [employer] or phone. If you have a critical or abnormal lab result, we will notify you by phone as soon as possible.  Submit refill requests through [employer] or call your pharmacy and they will forward the refill request to us. Please allow 3 business days for your refill to be completed.          Additional Information About Your Visit        MyChart Information     [employer] gives you secure access to your electronic health record. If you see a primary care provider, you can also send messages to your care team and make appointments. If you have questions, please call your primary care clinic.  If you do not have a primary care provider, please call [phone] and they will assist you.        Care EveryWhere ID     This is your Care EveryWhere ID. This could be used by other organizations to access your [place] medical records  [id number]         Blood Pressure from Last 3 Encounters:   10/09/18 120/81   07/18/18 108/62   05/15/18 110/68    Weight from Last 3 Encounters:   10/09/18 170 lb (77.1 kg)   07/18/18 187 lb 3.2 oz (84.9 kg)   05/15/18 183 lb 3.2 oz (83.1  kg)              We Performed the Following     1st  Administration  [16384]     Each additional admin.  (Right click and add QUANTITY)  [[id number]]     FLU VACCINE, SPLIT VIRUS, IM (QUADRIVALENT) [36367]- >3 YRS     TD PRSERV FREE >=7 YRS ADS IM [[id number]]        Primary Care Provider Office Phone # Fax #    NATHANAEL Medeiros [facility] [phone]       [facility] [address] [place] [address]        Equal Access to Services     JAY SALAZAR : Hadii aad ku hadasho Soomaali, waaxda luqadaha, qaybta kaalmada adeegyada, waxay idiin hayaan aderian magana . So [facility] [phone].    ATENCIÓN: Si habla español, tiene a abdullahi disposición servicios gratuitos de asistencia lingüística. Ll[facility] [phone].    We comply with applicable federal civil rights laws and [place] laws. We do not discriminate on the basis of race, color, national origin, age, disability, sex, sexual orientation, or gender identity.            Thank you!     Thank you for choosing [facility]  for your care. Our goal is always to provide you with excellent care. Hearing back from our patients is one way we can continue to improve our services. Please take a few minutes to complete the written survey that you may receive in the mail after your visit with us. Thank you!             Your Updated Medication List - Protect others around you: Learn how to safely use, store and throw away your medicines at www.disposemymeds.org.          This list is accurate as of 10/26/18  9:38 AM.  Always use your most recent med list.                   Brand Name Dispense Instructions for use Diagnosis    albuterol 108 (90 Base) MCG/ACT inhaler    VENTOLIN HFA    18 g    INHALE 2 PUFFS INTO THE LUNGS EVERY 6 HOURS AS NEEDED FOR SHORTNESS OF BREATH, DIFFICULTY BREATHING OR WHEEZING.    Acute bronchitis with symptoms > 10 days       fluocinonide 0.05 % cream    LIDEX    60 g    Apply sparingly to affected area twice  daily as needed.  Do not apply to face.    Rash       fluticasone 50 MCG/ACT spray    FLONASE    1 Bottle    Spray 2 sprays into both nostrils daily    Acute rhinitis, unspecified type, Cough       hydrOXYzine 50 MG capsule    VISTARIL    60 capsule    Take 1-2 capsules ( mg) at bedtime as needed for sleep.    Sleep disturbance, Generalized anxiety disorder       LORazepam 1 MG tablet    ATIVAN    20 tablet    Take 1/2 - 1 tab as needed for panic up to 3 times a week AS NEEDED needs to see psychology for further refills    Panic disorder with agoraphobia       norgestim-eth estrad triphasic 0.18/0.215/0.25 MG-35 MCG per tablet    TRI-LINYAH    84 tablet    Take 1 tablet by mouth daily    Encounter for initial prescription of contraceptive pills       predniSONE 20 MG tablet    DELTASONE    10 tablet    Take two tablets (= 40mg) each day for 5 (five) days        sertraline 100 MG tablet    ZOLOFT    90 tablet    Take 1.5 tablets (150 mg) by mouth daily    Major depressive disorder, recurrent episode, mild (H), Panic disorder with agoraphobia, Generalized anxiety disorder       traZODone 50 MG tablet    DESYREL     MAY TAKE 25MG-100MG PO QHS PRN

## 2018-10-26 NOTE — PROGRESS NOTES

## 2018-11-09 ENCOUNTER — TELEPHONE (OUTPATIENT)
Dept: FAMILY MEDICINE | Facility: CLINIC | Age: 23
End: 2018-11-09

## 2018-11-09 DIAGNOSIS — Z11.8 SPECIAL SCREENING EXAMINATION FOR CHLAMYDIAL DISEASE: Primary | ICD-10-CM

## 2018-11-09 NOTE — TELEPHONE ENCOUNTER
Panel Management Review      Patient has the following on her problem list:     Depression / Dysthymia review    Measure:  Needs PHQ-9 score of 4 or less during index window.  Administer PHQ-9 and if score is 5 or more, send encounter to provider for next steps.    5 - 7 month window range: unknown    PHQ-9 SCORE 1/26/2018 3/16/2018 5/15/2018   Total Score MyChart 12 (Moderate depression) - 23 (Severe depression)   Total Score 12 13 23       If PHQ-9 recheck is 5 or more, route to provider for next steps.    Patient is due for:  PHQ9      Composite cancer screening  Chart review shows that this patient is due/due soon for the following chlamydia    Summary:    Patient is due/failing the following:   chlamydia and PHQ9    Action needed:   Patient needs to do PHQ9. and Patient needs  lab only appointment    Type of outreach:    Sent On[employer] Images message.    Questions for provider review:    None                                                                                                                                    Sandee SUE [facility]       Chart routed to Care Team .

## 2018-11-16 DIAGNOSIS — F33.0 MAJOR DEPRESSIVE DISORDER, RECURRENT EPISODE, MILD (H): ICD-10-CM

## 2018-11-16 DIAGNOSIS — F40.01 PANIC DISORDER WITH AGORAPHOBIA: ICD-10-CM

## 2018-11-16 DIAGNOSIS — F41.1 GENERALIZED ANXIETY DISORDER: ICD-10-CM

## 2018-11-17 NOTE — TELEPHONE ENCOUNTER
"Requested Prescriptions   Pending Prescriptions Disp Refills     sertraline (ZOLOFT) 100 MG tablet   Last Written Prescription Date:  5/15/18  Last Fill Quantity: 90,  # refills: 1   Last office visit: 5/15/2018 with prescribing provider:  LAY Foley   Future Office Visit:     90 tablet 0     Sig: TAKE 1.5 TABLETS BY MOUTH ONCE DAILY    SSRIs Protocol Failed    11/16/2018  3:31 PM       Failed - PHQ-9 score less than 5 in past 6 months    Please review last PHQ-9 score.   PHQ-9 SCORE 1/26/2018 3/16/2018 5/15/2018   Total Score MyChart 12 (Moderate depression) - 23 (Severe depression)   Total Score 12 13 23     VICTORIA-7 SCORE 1/26/2018 3/16/2018 5/15/2018   Total Score 8 (mild anxiety) - 12 (moderate anxiety)   Total Score 8 14 12                Passed - Patient is age 18 or older       Passed - No active pregnancy on record       Passed - No positive pregnancy test in last 12 months       Passed - Recent (6 mo) or future (30 days) visit within the authorizing provider's specialty    Patient had office visit in the last 6 months or has a visit in the next 30 days with authorizing provider or within the authorizing provider's specialty.  See \"Patient Info\" tab in inbasket, or \"Choose Columns\" in Meds & Orders section of the refill encounter.              "

## 2018-11-19 RX ORDER — SERTRALINE HYDROCHLORIDE 100 MG/1
150 TABLET, FILM COATED ORAL DAILY
Qty: 45 TABLET | Refills: 0 | Status: SHIPPED | OUTPATIENT
Start: 2018-11-19 | End: 2019-03-20

## 2018-11-29 ENCOUNTER — OFFICE VISIT (OUTPATIENT)
Dept: FAMILY MEDICINE | Facility: CLINIC | Age: 23
End: 2018-11-29
Payer: COMMERCIAL

## 2018-11-29 ENCOUNTER — TELEPHONE (OUTPATIENT)
Dept: FAMILY MEDICINE | Facility: CLINIC | Age: 23
End: 2018-11-29

## 2018-11-29 VITALS
HEART RATE: 96 BPM | TEMPERATURE: 96.9 F | WEIGHT: 187.8 LBS | OXYGEN SATURATION: 97 % | DIASTOLIC BLOOD PRESSURE: 66 MMHG | BODY MASS INDEX: 32.06 KG/M2 | HEIGHT: 64 IN | SYSTOLIC BLOOD PRESSURE: 126 MMHG

## 2018-11-29 DIAGNOSIS — L20.82 FLEXURAL ECZEMA: ICD-10-CM

## 2018-11-29 DIAGNOSIS — R06.02 SOB (SHORTNESS OF BREATH): Primary | ICD-10-CM

## 2018-11-29 PROCEDURE — 99214 OFFICE O/P EST MOD 30 MIN: CPT | Performed by: INTERNAL MEDICINE

## 2018-11-29 RX ORDER — FLUOCINONIDE 0.5 MG/G
CREAM TOPICAL
Qty: 60 G | Refills: 1 | Status: SHIPPED | OUTPATIENT
Start: 2018-11-29 | End: 2022-10-12

## 2018-11-29 RX ORDER — PREDNISONE 20 MG/1
20 TABLET ORAL DAILY
Qty: 5 TABLET | Refills: 0 | Status: SHIPPED | OUTPATIENT
Start: 2018-11-29 | End: 2019-10-07

## 2018-11-29 ASSESSMENT — ANXIETY QUESTIONNAIRES
5. BEING SO RESTLESS THAT IT IS HARD TO SIT STILL: MORE THAN HALF THE DAYS
GAD7 TOTAL SCORE: 10
2. NOT BEING ABLE TO STOP OR CONTROL WORRYING: SEVERAL DAYS
3. WORRYING TOO MUCH ABOUT DIFFERENT THINGS: SEVERAL DAYS
7. FEELING AFRAID AS IF SOMETHING AWFUL MIGHT HAPPEN: MORE THAN HALF THE DAYS
1. FEELING NERVOUS, ANXIOUS, OR ON EDGE: MORE THAN HALF THE DAYS
6. BECOMING EASILY ANNOYED OR IRRITABLE: MORE THAN HALF THE DAYS
IF YOU CHECKED OFF ANY PROBLEMS ON THIS QUESTIONNAIRE, HOW DIFFICULT HAVE THESE PROBLEMS MADE IT FOR YOU TO DO YOUR WORK, TAKE CARE OF THINGS AT HOME, OR GET ALONG WITH OTHER PEOPLE: NOT DIFFICULT AT ALL

## 2018-11-29 ASSESSMENT — PATIENT HEALTH QUESTIONNAIRE - PHQ9
5. POOR APPETITE OR OVEREATING: NOT AT ALL
SUM OF ALL RESPONSES TO PHQ QUESTIONS 1-9: 11

## 2018-11-29 NOTE — MR AVS SNAPSHOT
After Visit Summary   11/29/2018    Madelyn Quintana    MRN: [id number]           Patient Information     Date Of Birth          1995        Visit Information        Provider Department      11/29/2018 1:40 PM Katharina Max, DO [facility]        Today's Diagnoses     SOB (shortness of breath)    -  1    Flexural eczema        Rash          Care Instructions    1. Referral to Derm  2. Try Dr. Scholls Freeze Away on the plantar wart  3. You may have asthma.  You need to have breathing tests done.  Please call the Pulmonary lab at [phone] to schedule  4. Start inhaled steroid.  You should start seeing some improvement after 1-2 weeks.  If symptoms worsen, then be seen soon.  Likely have viral illness.    Understanding Asthma    Asthma causes swelling and narrowing of the airways in your lungs. Medical experts are not exactly sure what causes asthma. It is believed to be caused by a mix of inherited and environmental factors.  Healthy lungs  Inside the lungs there are branching airways made of stretchy tissue. Each airway is wrapped with bands of muscle. The airways become more narrow as they go deeper into the lungs. The smallest airways end in clusters of tiny balloon-like air sacs (alveoli). These clusters are surrounded by blood vessels. When you breathe in (inhale), air enters the lungs. It travels down through the airways until it reaches the air sacs. When you breathe out (exhale), air travels up through the airways and out of the lungs. The airways produce mucus that traps particles you breathe in. Normally, the mucus is then swept out of the lungs by tiny hairs (cilia) that line the airways. The mucus is swallowed or coughed up.  What the lungs do  The air you inhale contains oxygen. When oxygen reaches the air sacs, it passes into the blood vessels surrounding the sacs. Your blood then delivers oxygen to all of your cells. As you exhale, carbon dioxide is removed in a  similar way from the blood in the air sacs, and from the body.  When you have asthma  People with asthma have very sensitive airways. This means the airways react to certain things called triggers (such as pollen, dust, or smoke) and become swollen and narrowed. Inflammation makes the airways swollen and narrowed. This is a long-lasting (chronic) problem. The airways may not always be narrowed enough to notice breathing problems.  Symptoms of chronic inflammation:     Coughing    Chest tightness    Shortness of breath    Wheezing (a whistling noise, especially when breathing out)    Low energy or feeling tired  In some people, over time chronic mild inflammation can lead to lasting (permanent) scarring of airways and loss of lung function.  Moderate flare-ups  When sensitive airways are irritated by a trigger, the muscles around the airways tighten. The lining of the airways swells. Thick, sticky mucus increases and partly clogs the airways. All of this makes you work harder to keep breathing.  Symptoms of moderate flare-ups:    Coughing, especially at night    Getting tired or out of breath easily    Wheezing    Chest tightness    Faster breathing when at rest  Severe flare-ups  Severe flare-ups are life-threatening. In a severe flare-up, the muscle tightening, swelling, and mucus production are even worse. It s very hard to breathe. Your body can't get enough oxygen and can't remove carbon dioxide. Waste gas is trapped in the alveoli, and gas exchange can t occur. The body is not getting enough oxygen. Without oxygen, body tissues, especially brain tissue, begin to get damaged. If this goes on for long, it can lead to severe brain damage or death.  Call 911 (or have someone call for you) if you have any of these symptoms and they are not relieved right away by taking your quick-relief medicine as prescribed:    Severe trouble breathing    Too short of breath to talk or walk    Lips or fingers turning  blue    Feeling lightheaded or dizzy, as though you are about to pass out    Peak flow less than 50% of your personal best, if you use peak flow monitoring  Asthma is a long-term condition. So it s important to work with your healthcare provider to manage it. If you smoke, get help to quit. Know your triggers and figure out how to avoid them. It s also very important to take your medicines as directed. That means taking them even when you feel good.  Date Last Reviewed: 12/1/2016 2000-2018 [employer]. [address] [place] [address]. All rights reserved. This information is not intended as a substitute for professional medical care. Always follow your healthcare professional's instructions.                Follow-ups after your visit        Additional Services     DERMATOLOGY REFERRAL       Your provider has referred you to: FM[facility]: [facility] [phone]   [email or web address]    Please be aware that coverage of these services is subject to the terms and limitations of your health insurance plan.  Call member services at your health plan with any benefit or coverage questions.      Please bring the following with you to your appointment:    (1) Any X-Rays, CTs or MRIs which have been performed.  Contact the facility where they were done to arrange for  prior to your scheduled appointment.    (2) List of current medications  (3) This referral request   (4) Any documents/labs given to you for this referral                  Follow-up notes from your care team     Return in about 2 weeks (around 12/13/2018) for If symptoms don't improve.      Future tests that were ordered for you today     Open Future Orders        Priority Expected Expires Ordered    General PFT Lab (Please always keep checked) Routine  11/29/2019 11/29/2018    Pulmonary Function Test Routine  11/29/2019 11/29/2018            Who to contact     If you have questions or  "need follow up information about today's clinic visit or your schedule please contact [facility] directly at [phone].  Normal or non-critical lab and imaging results will be communicated to you by [employer], letter or phone within 4 business days after the clinic has received the results. If you do not hear from us within 7 days, please contact the clinic through [employer] or phone. If you have a critical or abnormal lab result, we will notify you by phone as soon as possible.  Submit refill requests through [employer] or call your pharmacy and they will forward the refill request to us. Please allow 3 business days for your refill to be completed.          Additional Information About Your Visit        [employer] Information     [employer] gives you secure access to your electronic health record. If you see a primary care provider, you can also send messages to your care team and make appointments. If you have questions, please call your primary care clinic.  If you do not have a primary care provider, please call [phone] and they will assist you.        Care EveryWhere ID     This is your Care EveryWhere ID. This could be used by other organizations to access your [place] medical records  [id number]        Your Vitals Were     Pulse Temperature Height Last Period Pulse Oximetry BMI (Body Mass Index)    96 96.9  F (36.1  C) (Tympanic) 5' 4\" (1.626 m) 11/12/2018 97% 32.24 kg/m2       Blood Pressure from Last 3 Encounters:   11/29/18 126/66   10/09/18 120/81   07/18/18 108/62    Weight from Last 3 Encounters:   11/29/18 187 lb 12.8 oz (85.2 kg)   10/09/18 170 lb (77.1 kg)   07/18/18 187 lb 3.2 oz (84.9 kg)              We Performed the Following     DERMATOLOGY REFERRAL          Today's Medication Changes          These changes are accurate as of 11/29/18  2:19 PM.  If you have any questions, ask your nurse or doctor.               Start taking these medicines.        Dose/Directions    fluticasone 50 " MCG/BLIST inhaler   Commonly known as:  FLOVENT DISKUS   Used for:  SOB (shortness of breath)   Started by:  Katharina Max DO        Dose:  1 puff   Inhale 1 puff into the lungs 2 times daily   Quantity:  1 Inhaler   Refills:  3       predniSONE 20 MG tablet   Commonly known as:  DELTASONE   Used for:  SOB (shortness of breath)   Started by:  Katharina Max DO        Dose:  20 mg   Take 1 tablet (20 mg) by mouth daily   Quantity:  5 tablet   Refills:  0            Where to get your medicines      These medications were sent to [facility] [place], [place] - [address]  [address] [address]     Phone:  [phone]     fluocinonide 0.05 % external cream    fluticasone 50 MCG/BLIST inhaler    predniSONE 20 MG tablet                Primary Care Provider Office Phone # Fax #    Honey Whitfield NATHANEAL Foley [facility] [phone]       [facility] [address] [place] [address]        Equal Access to Services     JAY SALAZAR : Hadii aad ku hadasho Soomaali, waaxda luqadaha, qaybta kaalmada adeegyada, waxay nevaeh haykole magana . So [facility] [phone].    ATENCIÓN: Si habla español, tiene a abdullahi disposición servicios gratuitos de asistencia lingüística. Llame al [phone].    We comply with applicable federal civil rights laws and [place] laws. We do not discriminate on the basis of race, color, national origin, age, disability, sex, sexual orientation, or gender identity.            Thank you!     Thank you for choosing [facility]  for your care. Our goal is always to provide you with excellent care. Hearing back from our patients is one way we can continue to improve our services. Please take a few minutes to complete the written survey that you may receive in the mail after your visit with us. Thank you!             Your Updated Medication List - Protect others around you: Learn how to safely use, store and throw  away your medicines at www.disposemymeds.org.          This list is accurate as of 11/29/18  2:19 PM.  Always use your most recent med list.                   Brand Name Dispense Instructions for use Diagnosis    albuterol 108 (90 Base) MCG/ACT inhaler    VENTOLIN HFA    18 g    INHALE 2 PUFFS INTO THE LUNGS EVERY 6 HOURS AS NEEDED FOR SHORTNESS OF BREATH, DIFFICULTY BREATHING OR WHEEZING.    Acute bronchitis with symptoms > 10 days       fluocinonide 0.05 % external cream    LIDEX    60 g    Apply sparingly to affected area twice daily as needed.  Do not apply to face.    Rash       fluticasone 50 MCG/ACT nasal spray    FLONASE    1 Bottle    Spray 2 sprays into both nostrils daily    Acute rhinitis, unspecified type, Cough       fluticasone 50 MCG/BLIST inhaler    FLOVENT DISKUS    1 Inhaler    Inhale 1 puff into the lungs 2 times daily    SOB (shortness of breath)       hydrOXYzine 50 MG capsule    VISTARIL    60 capsule    Take 1-2 capsules ( mg) at bedtime as needed for sleep.    Sleep disturbance, Generalized anxiety disorder       LORazepam 1 MG tablet    ATIVAN    20 tablet    Take 1/2 - 1 tab as needed for panic up to 3 times a week AS NEEDED needs to see psychology for further refills    Panic disorder with agoraphobia       norgestim-eth estrad triphasic 0.18/0.215/0.25 MG-35 MCG tablet    TRI-LINYAH    84 tablet    Take 1 tablet by mouth daily    Encounter for initial prescription of contraceptive pills       predniSONE 20 MG tablet    DELTASONE    5 tablet    Take 1 tablet (20 mg) by mouth daily    SOB (shortness of breath)       sertraline 100 MG tablet    ZOLOFT    45 tablet    Take 1.5 tablets (150 mg) by mouth daily SEE MY Chart    Major depressive disorder, recurrent episode, mild (H), Panic disorder with agoraphobia, Generalized anxiety disorder       traZODone 50 MG tablet    DESYREL     MAY TAKE 25MG-100MG PO QHS PRN

## 2018-11-29 NOTE — PROGRESS NOTES
SUBJECTIVE:   Madelyn Quintana is a 23 year old female who presents to clinic today for the following health issues:    Chief Complaint   Patient presents with     Cough     Derm Problem     Letter for School/Work     Would like a letter for missing work today and possible tomorrow        ENT Symptoms             Symptoms: cc Present Absent Comment   Fever/Chills   x    Fatigue  x  Very mild   Muscle Aches  x  Neck only   Eye Irritation   x    Sneezing   x    Nasal Андрей/Drg  x  Drainage and congestion. Denies post-nasal drip   Sinus Pressure/Pain   x    Loss of smell   x    Dental pain   x    Sore Throat  x     Swollen Glands  x     Ear Pain/Fullness   x    Cough  x  Productive    Wheeze   x    Chest Pain  x     Shortness of breath  x     Rash   x    Other         Symptom duration: One week    Symptom severity:     Treatments tried: Inhaler, humidifier    Contacts: None      --she has been given albuterol inhaler for 'bronchitis' in the past.    --her BF reports he is very worried about her breathing.  Patient herself is also very worried about her breathing, feels like she might pass out due to breathing problems.  --she has been given albuterol inhaler many times over the last several years.  No personal or family history of asthma.  --she says she has coughed up blood, last night.  --albuterol inhaler does help with the shortness of breath.  --she carries her inhaler throughout the year for symptoms of shortness of breath.  Uses 1-2 x day regularly, occasionally will go a few days w/out using it but not much longer than that.  --breathing is worse in the AM, has 'lung' pain, and feels like there is 'liquid' in the lungs.  This is when she is most scared about there breathing - AM.     --no seasonal allergies  --smokes 0-10 cig/day, for about 1 year or so.  --she frequently wakes up at night with shortness of breath or coughing - recently and over the last 1 year.    8/16 - zpak, pred,albut - lungs were  clear.  11/16 - bronchitis, albuterol  12/17 - albut fill, but no office visit, just telephone call.  10/18 - ER gave her pred and albut - lungs were clear, diagnosis was viral bronchitis.  Reports this did help with both cough and shortness of breath.  Got mostly better until the last 1 week.       Rash  Onset: psoriasis, has had since she was little     Description:   Location: both feet, back, knees, arms   Character: blotchy, rasied, bumps   Itching (Pruritis): YES    Progression of Symptoms:  worsening    Accompanying Signs & Symptoms:  Fever: no   Body aches or joint pain: no   Sore throat symptoms: no   Recent cold symptoms: no     History:   Previous similar rash: YES    Precipitating factors:   Exposure to similar rash: no   New exposures: None   Recent travel: no     Alleviating factors:  None     Therapies Tried and outcome: Has tried lidex cream in the past- helped the itching, would like a referral to derm       Current Outpatient Prescriptions   Medication Sig Dispense Refill     albuterol (VENTOLIN HFA) 108 (90 Base) MCG/ACT inhaler INHALE 2 PUFFS INTO THE LUNGS EVERY 6 HOURS AS NEEDED FOR SHORTNESS OF BREATH, DIFFICULTY BREATHING OR WHEEZING. 18 g 1     hydrOXYzine (VISTARIL) 50 MG capsule Take 1-2 capsules ( mg) at bedtime as needed for sleep. 60 capsule 3     LORazepam (ATIVAN) 1 MG tablet Take 1/2 - 1 tab as needed for panic up to 3 times a week AS NEEDED needs to see psychology for further refills 20 tablet 0     sertraline (ZOLOFT) 100 MG tablet Take 1.5 tablets (150 mg) by mouth daily SEE MY Chart 45 tablet 0     fluocinonide (LIDEX) 0.05 % cream Apply sparingly to affected area twice daily as needed.  Do not apply to face. (Patient not taking: Reported on 7/18/2018) 60 g 1     fluticasone (FLONASE) 50 MCG/ACT spray Spray 2 sprays into both nostrils daily (Patient not taking: Reported on 3/14/2018) 1 Bottle 3     norgestim-eth estrad triphasic (TRI-LINYAH) 0.18/0.215/0.25 MG-35 MCG per  "tablet Take 1 tablet by mouth daily (Patient not taking: Reported on 11/29/2018) 84 tablet 3     traZODone (DESYREL) 50 MG tablet MAY TAKE 25MG-100MG PO QHS PRN  0       Reviewed and updated as needed this visit by clinical staff  Tobacco  Allergies  Meds  Problems  Med Hx  Surg Hx  Fam Hx  Soc Hx        Reviewed and updated as needed this visit by Provider  Allergies  Meds  Problems         ROS:  Constitutional, HEENT, cardiovascular, pulmonary, gi and gu systems are negative, except as otherwise noted.    OBJECTIVE:     /66  Pulse 96  Temp 96.9  F (36.1  C) (Tympanic)  Ht 5' 4\" (1.626 m)  Wt 187 lb 12.8 oz (85.2 kg)  LMP 11/12/2018  SpO2 97%  BMI 32.24 kg/m2  Body mass index is 32.24 kg/(m^2).  GENERAL APPEARANCE: healthy, alert, no distress and over weight  EYES: Eyes grossly normal to inspection, PERRL and conjunctivae and sclerae normal  HENT: ear canals and TM's normal and nose and mouth without ulcers or lesions  NECK: no adenopathy, no asymmetry, masses, or scars and thyroid normal to palpation  RESP: lungs clear to auscultation - no rales, rhonchi or wheezes  CV: regular rates and rhythm, normal S1 S2, no S3 or S4 and no murmur, click or rub  SKIN: hyperpigmented nodules with overlying silver scale and hypopigmented macules in flexural areas of arms, ankles.  Painful callus with pinpoint central necrotic center on plantar surface of left foot      ASSESSMENT/PLAN:     1. SOB (shortness of breath) - concern for underlying asthma exacerbated by viral illness.  - General PFT Lab (Please always keep checked); Future  - Pulmonary Function Test; Future  - fluticasone (FLOVENT DISKUS) 50 MCG/BLIST inhaler; Inhale 1 puff into the lungs 2 times daily  Dispense: 1 Inhaler; Refill: 3  - predniSONE (DELTASONE) 20 MG tablet; Take 1 tablet (20 mg) by mouth daily  Dispense: 5 tablet; Refill: 0    2. Flexural eczema  - DERMATOLOGY REFERRAL  - fluocinonide (LIDEX) 0.05 % external cream; Apply " sparingly to affected area twice daily as needed.  Do not apply to face.  Dispense: 60 g; Refill: 1    1. Referral to Derm  2. Try Dr. Scholls Freeze Away on the plantar wart  3. You may have asthma.  You need to have breathing tests done.  Please call the Pulmonary lab at [phone] to schedule  4. Start inhaled steroid.  You should start seeing some improvement after 1-2 weeks.  If symptoms worsen, then be seen soon.  Likely have viral illness.    Katharina Max, DO  [facility]

## 2018-11-29 NOTE — TELEPHONE ENCOUNTER
Left this detailed message on patient's voicemail.  The voicemail prompt identified patient as the  of this message.  Jen Magana RN

## 2018-11-29 NOTE — PATIENT INSTRUCTIONS
1. Referral to Derm  2. Try Dr. Scholls Freeze Away on the plantar wart  3. You may have asthma.  You need to have breathing tests done.  Please call the Pulmonary lab at [phone] to schedule  4. Start inhaled steroid.  You should start seeing some improvement after 1-2 weeks.  If symptoms worsen, then be seen soon.  Likely have viral illness.    Understanding Asthma    Asthma causes swelling and narrowing of the airways in your lungs. Medical experts are not exactly sure what causes asthma. It is believed to be caused by a mix of inherited and environmental factors.  Healthy lungs  Inside the lungs there are branching airways made of stretchy tissue. Each airway is wrapped with bands of muscle. The airways become more narrow as they go deeper into the lungs. The smallest airways end in clusters of tiny balloon-like air sacs (alveoli). These clusters are surrounded by blood vessels. When you breathe in (inhale), air enters the lungs. It travels down through the airways until it reaches the air sacs. When you breathe out (exhale), air travels up through the airways and out of the lungs. The airways produce mucus that traps particles you breathe in. Normally, the mucus is then swept out of the lungs by tiny hairs (cilia) that line the airways. The mucus is swallowed or coughed up.  What the lungs do  The air you inhale contains oxygen. When oxygen reaches the air sacs, it passes into the blood vessels surrounding the sacs. Your blood then delivers oxygen to all of your cells. As you exhale, carbon dioxide is removed in a similar way from the blood in the air sacs, and from the body.  When you have asthma  People with asthma have very sensitive airways. This means the airways react to certain things called triggers (such as pollen, dust, or smoke) and become swollen and narrowed. Inflammation makes the airways swollen and narrowed. This is a long-lasting (chronic) problem. The airways may not always be narrowed  enough to notice breathing problems.  Symptoms of chronic inflammation:     Coughing    Chest tightness    Shortness of breath    Wheezing (a whistling noise, especially when breathing out)    Low energy or feeling tired  In some people, over time chronic mild inflammation can lead to lasting (permanent) scarring of airways and loss of lung function.  Moderate flare-ups  When sensitive airways are irritated by a trigger, the muscles around the airways tighten. The lining of the airways swells. Thick, sticky mucus increases and partly clogs the airways. All of this makes you work harder to keep breathing.  Symptoms of moderate flare-ups:    Coughing, especially at night    Getting tired or out of breath easily    Wheezing    Chest tightness    Faster breathing when at rest  Severe flare-ups  Severe flare-ups are life-threatening. In a severe flare-up, the muscle tightening, swelling, and mucus production are even worse. It s very hard to breathe. Your body can't get enough oxygen and can't remove carbon dioxide. Waste gas is trapped in the alveoli, and gas exchange can t occur. The body is not getting enough oxygen. Without oxygen, body tissues, especially brain tissue, begin to get damaged. If this goes on for long, it can lead to severe brain damage or death.  Call 911 (or have someone call for you) if you have any of these symptoms and they are not relieved right away by taking your quick-relief medicine as prescribed:    Severe trouble breathing    Too short of breath to talk or walk    Lips or fingers turning blue    Feeling lightheaded or dizzy, as though you are about to pass out    Peak flow less than 50% of your personal best, if you use peak flow monitoring  Asthma is a long-term condition. So it s important to work with your healthcare provider to manage it. If you smoke, get help to quit. Know your triggers and figure out how to avoid them. It s also very important to take your medicines as directed. That  means taking them even when you feel good.  Date Last Reviewed: 12/1/2016 2000-2018 [employer] [address], [place] [address]. All rights reserved. This information is not intended as a substitute for professional medical care. Always follow your healthcare professional's instructions.

## 2018-11-29 NOTE — LETTER
November 29, 2018      Madelyn Quintana  [address]   [place] [address]        To Whom It May Concern:    Madelyn Quintana was seen in our clinic. She missed work 11/29-30 due to acute illness          Sincerely,        Katharina Max DO

## 2018-11-29 NOTE — TELEPHONE ENCOUNTER
Reason for Call:  Other med request    Detailed comments: Flovent is too expensive for patient.  Arnuity is less expensive.    Phone Number Pharmacy can be reached at: Other phone number:  [phone]    Best Time: asap  patient is waiting    Can we leave a detailed message on this number? YES    Call taken on 11/29/2018 at 2:44 PM by Orly Crespo

## 2018-11-29 NOTE — TELEPHONE ENCOUNTER
Dr. Max,    Patient calls saying the flovent is too expensive and would like to try arnuity.  I have pended for your approval. Ewelina STEVENSON RN

## 2018-11-30 ASSESSMENT — ANXIETY QUESTIONNAIRES: GAD7 TOTAL SCORE: 10

## 2018-12-14 ENCOUNTER — MYC REFILL (OUTPATIENT)
Dept: FAMILY MEDICINE | Facility: CLINIC | Age: 23
End: 2018-12-14

## 2018-12-14 DIAGNOSIS — F40.01 PANIC DISORDER WITH AGORAPHOBIA: ICD-10-CM

## 2018-12-17 NOTE — TELEPHONE ENCOUNTER
Routing refill request to provider for review/approval because:  Drug not on the [facility] refill protocol     ROGELIO Raines

## 2018-12-18 RX ORDER — LORAZEPAM 1 MG/1
TABLET ORAL
Qty: 20 TABLET | Refills: 0 | Status: SHIPPED | OUTPATIENT
Start: 2018-12-18 | End: 2021-08-06

## 2018-12-18 NOTE — TELEPHONE ENCOUNTER
Short refill provided - printed. Patient does need to find a psychiatrist for multiple mental health concerns and difficulty with medications. She should have this information, if not please let me know and will place a new referral.    Thanks  NATHANAEL Khan CNP

## 2018-12-18 NOTE — TELEPHONE ENCOUNTER
LM on identifiable VM informing/ advising as noted per Honey. To call clinic nurse if any questions/ concerns/ need for referral.  LESLY Wells RN

## 2019-01-03 ENCOUNTER — HOSPITAL ENCOUNTER (OUTPATIENT)
Dept: RESPIRATORY THERAPY | Facility: CLINIC | Age: 24
Discharge: HOME | End: 2019-01-03
Attending: NURSE PRACTITIONER | Admitting: NURSE PRACTITIONER
Payer: COMMERCIAL

## 2019-01-03 DIAGNOSIS — R06.02 SOB (SHORTNESS OF BREATH): ICD-10-CM

## 2019-01-03 PROCEDURE — 25000125 ZZHC RX 250: Performed by: INTERNAL MEDICINE

## 2019-01-03 PROCEDURE — 94060 EVALUATION OF WHEEZING: CPT

## 2019-01-03 PROCEDURE — 94060 EVALUATION OF WHEEZING: CPT | Performed by: INTERNAL MEDICINE

## 2019-01-03 RX ORDER — ALBUTEROL SULFATE 0.83 MG/ML
2.5 SOLUTION RESPIRATORY (INHALATION) ONCE
Status: COMPLETED | OUTPATIENT
Start: 2019-01-03 | End: 2019-01-03

## 2019-01-03 RX ADMIN — ALBUTEROL SULFATE 2.5 MG: 2.5 SOLUTION RESPIRATORY (INHALATION) at 08:20

## 2019-01-09 LAB
EXPTIME-PRE: 5.42 SEC
FEF2575-%PRED-POST: 134 %
FEF2575-%PRED-PRE: 98 %
FEF2575-POST: 4.81 L/SEC
FEF2575-PRE: 3.51 L/SEC
FEF2575-PRED: 3.57 L/SEC
FEFMAX-%PRED-PRE: 99 %
FEFMAX-PRE: 6.64 L/SEC
FEFMAX-PRED: 6.7 L/SEC
FEV1-%PRED-PRE: 94 %
FEV1-PRE: 2.8 L
FEV1FEV6-PRE: 89 %
FEV1FEV6-PRED: 87 %
FEV1FVC-PRE: 89 %
FEV1FVC-PRED: 86 %
FIFMAX-PRE: 2.2 L/SEC
FVC-%PRED-PRE: 93 %
FVC-PRE: 3.14 L
FVC-PRED: 3.36 L

## 2019-01-09 NOTE — RESULT ENCOUNTER NOTE
There is not evidence of asthma on the breathing test.  However the breathing tests are not quite normal. Could have something like vocal cord problem contributing to her breathing issues.  Recommend she follow-up with her primary care provider for further discussion

## 2019-03-20 DIAGNOSIS — F40.01 PANIC DISORDER WITH AGORAPHOBIA: ICD-10-CM

## 2019-03-20 DIAGNOSIS — F33.0 MAJOR DEPRESSIVE DISORDER, RECURRENT EPISODE, MILD (H): ICD-10-CM

## 2019-03-20 DIAGNOSIS — F41.1 GENERALIZED ANXIETY DISORDER: ICD-10-CM

## 2019-03-21 ENCOUNTER — NURSE TRIAGE (OUTPATIENT)
Dept: NURSING | Facility: CLINIC | Age: 24
End: 2019-03-21

## 2019-03-21 ENCOUNTER — MYC MEDICAL ADVICE (OUTPATIENT)
Dept: FAMILY MEDICINE | Facility: CLINIC | Age: 24
End: 2019-03-21

## 2019-03-21 RX ORDER — SERTRALINE HYDROCHLORIDE 100 MG/1
TABLET, FILM COATED ORAL
Qty: 45 TABLET | Refills: 1 | Status: SHIPPED | OUTPATIENT
Start: 2019-03-21 | End: 2020-11-02

## 2019-03-21 ASSESSMENT — PATIENT HEALTH QUESTIONNAIRE - PHQ9
SUM OF ALL RESPONSES TO PHQ QUESTIONS 1-9: 9
SUM OF ALL RESPONSES TO PHQ QUESTIONS 1-9: 9
10. IF YOU CHECKED OFF ANY PROBLEMS, HOW DIFFICULT HAVE THESE PROBLEMS MADE IT FOR YOU TO DO YOUR WORK, TAKE CARE OF THINGS AT HOME, OR GET ALONG WITH OTHER PEOPLE: SOMEWHAT DIFFICULT

## 2019-03-21 ASSESSMENT — ANXIETY QUESTIONNAIRES
7. FEELING AFRAID AS IF SOMETHING AWFUL MIGHT HAPPEN: SEVERAL DAYS
4. TROUBLE RELAXING: NOT AT ALL
5. BEING SO RESTLESS THAT IT IS HARD TO SIT STILL: NOT AT ALL
GAD7 TOTAL SCORE: 5
GAD7 TOTAL SCORE: 5
2. NOT BEING ABLE TO STOP OR CONTROL WORRYING: SEVERAL DAYS
6. BECOMING EASILY ANNOYED OR IRRITABLE: NOT AT ALL
3. WORRYING TOO MUCH ABOUT DIFFERENT THINGS: SEVERAL DAYS
7. FEELING AFRAID AS IF SOMETHING AWFUL MIGHT HAPPEN: SEVERAL DAYS
GAD7 TOTAL SCORE: 5
1. FEELING NERVOUS, ANXIOUS, OR ON EDGE: MORE THAN HALF THE DAYS

## 2019-03-21 NOTE — TELEPHONE ENCOUNTER
Patient called back saying she has not been taking her medications consistently and that is why her score numbers are elevated.  She would like to wait until she is more consistent and retake the PHQ 9 assessment in a month.  Recommended she call and speak to Jennifer when clinic opens to discuss.   Routed to [facility] care team pool  Ratna Jin RN  [facility]

## 2019-03-21 NOTE — TELEPHONE ENCOUNTER
Patient called back saying she has not been taking her medications consistently and that is why her score numbers are elevated.  She would like to wait until she is more consistent and retake the PHQ 9 assessment in a month.  Recommended she call and speak to Jennifer when clinic opens to discuss.   Routed to [facility] care team pool  Ratna Jin RN  [facility]

## 2019-03-21 NOTE — TELEPHONE ENCOUNTER
"Requested Prescriptions   Pending Prescriptions Disp Refills     sertraline (ZOLOFT) 100 MG tablet [Pharmacy Med Name: SERTRALINE HCL 100MG TABS] 45 tablet 0     Sig: TAKE 1 AND 1/2 TABLETS BY MOUTH ONCE DAILY    [facility]Is Protocol Failed - 3/20/2019  9:51 PM       Failed - PHQ-9 score less than 5 in past 6 months    Please review last PHQ-9 score.          Passed - Medication is active on med list       Passed - Patient is age 18 or older       Passed - No active pregnancy on record       Passed - No positive pregnancy test in last 12 months       Passed - Recent (6 mo) or future (30 days) visit within the authorizing provider's specialty    Patient had office visit in the last 6 months or has a visit in the next 30 days with authorizing provider or within the authorizing provider's specialty.  See \"Patient Info\" tab in inbasket, or \"Choose Columns\" in Meds & Orders section of the refill encounter.            Last Written Prescription Date:  11/19/18  Last Fill Quantity: 45,  # refills: 0   Last office visit: 11/29/2018 with prescribing provider:     Future Office Visit:      "

## 2019-03-22 ASSESSMENT — PATIENT HEALTH QUESTIONNAIRE - PHQ9: SUM OF ALL RESPONSES TO PHQ QUESTIONS 1-9: 9

## 2019-03-22 ASSESSMENT — ANXIETY QUESTIONNAIRES: GAD7 TOTAL SCORE: 5

## 2019-03-22 NOTE — TELEPHONE ENCOUNTER
Pt called. States she has had thoughts of dying but has no plans to follow through or act on thoughts. States she has not been taking her medications on a regular basis. Declines an appointment at this time. Jennifer Booker RN

## 2019-10-04 ENCOUNTER — NURSE TRIAGE (OUTPATIENT)
Dept: NURSING | Facility: CLINIC | Age: 24
End: 2019-10-04
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
## 2019-10-07 ENCOUNTER — OFFICE VISIT (OUTPATIENT)
Dept: FAMILY MEDICINE | Facility: CLINIC | Age: 24
End: 2019-10-07
Payer: COMMERCIAL

## 2019-10-07 VITALS
BODY MASS INDEX: 29.71 KG/M2 | HEART RATE: 88 BPM | WEIGHT: 174 LBS | TEMPERATURE: 96.8 F | OXYGEN SATURATION: 96 % | HEIGHT: 64 IN | RESPIRATION RATE: 15 BRPM | SYSTOLIC BLOOD PRESSURE: 100 MMHG | DIASTOLIC BLOOD PRESSURE: 70 MMHG

## 2019-10-07 DIAGNOSIS — R55 VASOVAGAL EPISODE: Primary | ICD-10-CM

## 2019-10-07 DIAGNOSIS — G43.109 MIGRAINE WITH AURA AND WITHOUT STATUS MIGRAINOSUS, NOT INTRACTABLE: ICD-10-CM

## 2019-10-07 PROCEDURE — 99213 OFFICE O/P EST LOW 20 MIN: CPT | Performed by: NURSE PRACTITIONER

## 2019-10-07 ASSESSMENT — MIFFLIN-ST. JEOR: SCORE: 1529.26

## 2019-10-07 NOTE — PATIENT INSTRUCTIONS
1.  Push fluids daily (64 oz daily).  2.  Eat regularly throughout the day.  3.  Make sure you are getting good sleep at night.  4.  Take Ibuprofen 800 mg (4 tabs) with onset of headache.  5.  Make a diary of your headaches, when they start, what you were doing, if any aura prior to the headache, how long they last, how they feel, any other symptoms (dizziness, hearing, vision changes).  6.  If symptoms are more than once a week with headaches, recommend follow-up in clinic with primary care provider to discuss options.

## 2019-10-07 NOTE — PROGRESS NOTES
Madelyn Quintana is a 24 year old female who presents to clinic today for the following health issues:    HPI   Headaches/ Nurse Triaged on 10/4/2019      Duration: 10/3/2019    Description  Location: unilateral in the left temporal area   Character: dull pain  Frequency:  Daily (once or twice)  Duration:  Couple hours     Intensity:  6/10    Accompanying signs and symptoms:      Feeling tired more lately    Precipitating or Alleviating factors:  Nausea/vomiting: no  Dizziness: no  Weakness or numbness: no  Visual changes: none  Fever: no   Sinus or URI symptoms no     History  Head trauma: YES- fell and hit her head on the door on 10/3/2019  Family history of migraines: no   Previous tests for headaches: no   Neurologist evaluations: no   Able to do daily activities when headache present: YES- sometimes   Wake with headaches: no   Daily pain medication use: no   Any changes in: none    Precipitating or Alleviating factors (light/sound/sleep/caffeine): sounds make it worse since fall    Therapies tried and outcome: none    Outcome - none  Frequent/daily pain medication use: no     Getting 8-9 hours of sleep.  Has 4893-0887 calories daily and eats throughout the day on her diet.  Drinking fluids, 50-60 oz daily.  Does not take anything for the headaches.  No regular OTC ibuprofen use.  No headache currently.  No chest pain or shortness of breath with fall, just ringing in both ears getting louder, headache worsened and then passed out.  LOC for 5-6 seconds. Was eating and drinking that day.  Feels off prior to headaches about a couple minutes prior to headache.  She did feel this was prior to ringing and headache that day.    No family hx of migraines or headaches.  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  Increase in stress due to going back to school.  No exercise.    Patient was bearing down and trying to open an olive jar when she collapsed and had her short loss of consciousness.      Patient  Active Problem List   Diagnosis     Labial infection     CARDIOVASCULAR SCREENING; LDL GOAL LESS THAN 160     Generalized anxiety disorder     Major depressive disorder, recurrent episode, mild (H)     Panic disorder with agoraphobia     Vitamin D deficiency     History reviewed. No pertinent surgical history.    Social History     Tobacco Use     Smoking status: Current Every Day Smoker     Packs/day: 0.50     Types: Cigarettes     Smokeless tobacco: Never Used     Tobacco comment: 5 cigarettes a day    Substance Use Topics     Alcohol use: Yes     Comment: during past week.     Family History   Problem Relation Age of Onset     Unknown/Adopted Paternal Grandmother      Unknown/Adopted Paternal Grandfather      Unknown/Adopted Maternal Grandfather          Current Outpatient Medications   Medication Sig Dispense Refill     albuterol (VENTOLIN HFA) 108 (90 Base) MCG/ACT inhaler INHALE 2 PUFFS INTO THE LUNGS EVERY 6 HOURS AS NEEDED FOR SHORTNESS OF BREATH, DIFFICULTY BREATHING OR WHEEZING. 18 g 1     fluocinonide (LIDEX) 0.05 % external cream Apply sparingly to affected area twice daily as needed.  Do not apply to face. 60 g 1     fluticasone (ARNUITY ELLIPTA) 50 MCG/ACT inhaler Inhale 1 puff into the lungs daily 30 each 1     fluticasone (FLONASE) 50 MCG/ACT spray Spray 2 sprays into both nostrils daily 1 Bottle 3     fluticasone (FLOVENT DISKUS) 50 MCG/BLIST inhaler Inhale 1 puff into the lungs 2 times daily 1 Inhaler 3     hydrOXYzine (VISTARIL) 50 MG capsule Take 1-2 capsules ( mg) at bedtime as needed for sleep. 60 capsule 3     LORazepam (ATIVAN) 1 MG tablet Take 1/2 - 1 tab as needed for panic up to 3 times a week AS NEEDED needs to see psychology for further refills 20 tablet 0     sertraline (ZOLOFT) 100 MG tablet TAKE 1 AND 1/2 TABLETS BY MOUTH ONCE DAILY 45 tablet 1     traZODone (DESYREL) 50 MG tablet MAY TAKE 25MG-100MG PO QHS PRN  0     Allergies   Allergen Reactions     Lactose Diarrhea      "Intolerance     Reviewed and updated as needed this visit by Provider  Tobacco  Allergies  Meds  Problems  Med Hx  Surg Hx  Fam Hx         Review of Systems   ROS COMP: CONSTITUTIONAL: NEGATIVE for fever, chills, change in weight  INTEGUMENTARY/SKIN: NEGATIVE for worrisome rashes, moles or lesions  ENT/MOUTH: NEGATIVE for ear, mouth and throat problems  RESP: NEGATIVE for significant cough or SOB  CV: NEGATIVE for chest pain, palpitations or peripheral edema  MUSCULOSKELETAL: NEGATIVE for significant arthralgias or myalgia  NEURO: POSITIVE for headaches that worsen with loud sounds that occurred prior to fall and worse since, and single LOC syncopal episode for about 5 seconds during opening a jar of olives in the kitchen  PSYCHIATRIC: NEGATIVE for changes in mood or affect  ROS otherwise negative      Objective    /70   Pulse 88   Temp 96.8  F (36  C) (Tympanic)   Resp 15   Ht 1.626 m (5' 4\")   Wt 78.9 kg (174 lb)   SpO2 96%   BMI 29.87 kg/m     Body mass index is 29.87 kg/m .  Physical Exam   GENERAL: healthy, alert and no distress  EYES: Eyes grossly normal to inspection, PERRL and conjunctivae and sclerae normal  HENT: ear canals and TM's normal, nose and mouth without ulcers or lesions  NECK: no adenopathy and no asymmetry, masses, or scars  RESP: lungs clear to auscultation - no rales, rhonchi or wheezes  CV: regular rate and rhythm, normal S1 S2, no S3 or S4, no murmur, click or rub, no peripheral edema and peripheral pulses strong  ABDOMEN: soft, nontender, no hepatosplenomegaly, no masses and bowel sounds normal  MS: no gross musculoskeletal defects noted, no edema  NEURO: Normal strength and tone, mentation intact, speech normal, cranial nerves 2-12 intact, Romberg normal, past point normal, and monica hallpike normal  PSYCH: mentation appears normal, affect normal/bright    Diagnostic Test Results:  Labs reviewed in Epic        Assessment & Plan     1. Vasovagal episode  Patient's history " and symptoms of syncopal episode correlate with a vasovagal response probably secondary to bearing down during opening the all of jar in the kitchen.  Neuro's are intact on exam.  No dizziness today.  Hearing is normal and vision is normal as well.  Recommend pushing fluids.    2. Migraine with aura and without status migrainosus, not intractable  Patient seems to have episodic migraines prior to the fall and headaches have worsened since she fell most likely due to hitting her head.  Neuro's are normal today and I recommend fluids, ibuprofen at onset of headache of 800 mg.  And to give this some time for healing.  Recommend follow-up in clinic if any persistence for discussion on starting triptan medication if needed.    See Patient Instructions    Return in about 1 month (around 11/7/2019), or if symptoms worsen or fail to improve.    Eugenia Hanley NP  [facility]

## 2020-02-23 ENCOUNTER — HEALTH MAINTENANCE LETTER (OUTPATIENT)
Age: 25
End: 2020-02-23

## 2020-08-21 ENCOUNTER — MYC REFILL (OUTPATIENT)
Dept: FAMILY MEDICINE | Facility: CLINIC | Age: 25
End: 2020-08-21

## 2020-08-21 ENCOUNTER — MYC REFILL (OUTPATIENT)
Dept: EMERGENCY MEDICINE | Facility: CLINIC | Age: 25
End: 2020-08-21

## 2020-08-21 DIAGNOSIS — J20.9 ACUTE BRONCHITIS WITH SYMPTOMS > 10 DAYS: ICD-10-CM

## 2020-08-21 DIAGNOSIS — R06.02 SOB (SHORTNESS OF BREATH): ICD-10-CM

## 2020-08-21 RX ORDER — ALBUTEROL SULFATE 90 UG/1
AEROSOL, METERED RESPIRATORY (INHALATION)
Qty: 18 G | Refills: 1 | OUTPATIENT
Start: 2020-08-21

## 2020-08-24 RX ORDER — ALBUTEROL SULFATE 90 UG/1
AEROSOL, METERED RESPIRATORY (INHALATION)
Qty: 18 G | Refills: 1 | Status: SHIPPED | OUTPATIENT
Start: 2020-08-24 | End: 2023-07-11

## 2020-08-24 NOTE — TELEPHONE ENCOUNTER
"Routing refill request to provider for review/approval because:  Patient needs to be seen because it has been more than 1 year since last office visit.  Has been seen by a float provider in the last year.  RX is also from 2018.        Requested Prescriptions   Pending Prescriptions Disp Refills     fluticasone (ARNUITY ELLIPTA) 50 MCG/ACT inhaler 30 each 1     Sig: Inhale 1 puff into the lungs daily       Inhaled Steroids Protocol Passed - 8/21/2020 10:13 PM        Passed - Patient is age 12 or older        Passed - Recent (12 mo) or future (30 days) visit within the authorizing provider's specialty     Patient has had an office visit with the authorizing provider or a provider within the authorizing providers department within the previous 12 mos or has a future within next 30 days. See \"Patient Info\" tab in inbasket, or \"Choose Columns\" in Meds & Orders section of the refill encounter.              Passed - Medication is active on med list             "

## 2020-11-02 ENCOUNTER — VIRTUAL VISIT (OUTPATIENT)
Dept: FAMILY MEDICINE | Facility: CLINIC | Age: 25
End: 2020-11-02
Payer: COMMERCIAL

## 2020-11-02 DIAGNOSIS — Z20.822 SUSPECTED COVID-19 VIRUS INFECTION: Primary | ICD-10-CM

## 2020-11-02 PROCEDURE — 99213 OFFICE O/P EST LOW 20 MIN: CPT | Mod: TEL | Performed by: INTERNAL MEDICINE

## 2020-11-02 RX ORDER — FLUOXETINE 10 MG/1
CAPSULE ORAL
COMMUNITY
Start: 2020-09-23 | End: 2021-08-06 | Stop reason: ALTCHOICE

## 2020-11-02 NOTE — LETTER
[facility]  [address] [address]  Phone: [phone]    November 2, 2020        Madelyn Quintana  [address]    [place] [address]          To whom it may concern:    RE: Madelyn Quintana    Patient was seen and treated today virtually.  She should be excused from work since yesterday until at least she is able to have testing performed.  If testing is negative and she is feeling better, she may return to work.  If testing is positive, she will need to remain off work until at least 11/11/2020.      Please contact me for questions or concerns.      Sincerely,        Presley Paez MD  Signed electronically 11/2/2020 2:39pm

## 2020-11-02 NOTE — PROGRESS NOTES
"Madelyn Quintana is a 25 year old female who is being evaluated via a billable telephone visit.      The patient has been notified of following:     \"This telephone visit will be conducted via a call between you and your physician/provider. We have found that certain health care needs can be provided without the need for a physical exam.  This service lets us provide the care you need with a short phone conversation.  If a prescription is necessary we can send it directly to your pharmacy.  If lab work is needed we can place an order for that and you can then stop by our lab to have the test done at a later time.    Telephone visits are billed at different rates depending on your insurance coverage. During this emergency period, for some insurers they may be billed the same as an in-person visit.  Please reach out to your insurance provider with any questions.    If during the course of the call the physician/provider feels a telephone visit is not appropriate, you will not be charged for this service.\"    Patient has given verbal consent for Telephone visit?  Yes    What phone number would you like to be contacted at? [phone]    How would you like to obtain your AVS? Livan    Subjective     Madelyn Quintana is a 25 year old female who presents via phone visit today for the following health issues:    HPI       Concern for COVID-19  About how many days ago did these symptoms start? 11/1/20  Is this your first visit for this illness? Yes  In the 14 days before your symptoms started, have you had close contact with someone with COVID-19 (Coronavirus)? Yes, I have been in contact with someone who has COVID-19/Coronavirus (confirmed by lab test).  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 No  Are you having new or worsening difficulty breathing? Yes   Please describe what kind of difficulty you are having breathing:Mild dyspnea " (decreased exercise tolerance, able to do ADLs without difficulty)  Do you have new or worsening cough? Yes, I am coughing up mucus.  Have you had any new or unexplained body aches? YES    Have you experienced any of the following NEW symptoms?    Headache: YES near ears    Sore throat: YES- mild, no obviously enlarged nodes in the neck    Loss of taste or smell: No    Chest pain: No    Diarrhea: No, but has been vomiting    Rash: No  What treatments have you tried? none  Who do you live with? Lives with deisi and 5 year old daughter; She has been isolating in her room since getting sick.  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  Are you, or a household member, a healthcare worker or a ? YES self and fiance.  Do you live in a nursing home, group home, or shelter? No  Do you have a way to get food/medications if quarantined? Yes, I have a friend or family member who can help me.        Review of Systems   Constitutional, HEENT, pulm systems are negative, except as otherwise noted.       Objective          Vitals:  No vitals were obtained today due to virtual visit.    alert and fatigued  PSYCH: Alert and oriented times 3; coherent speech, normal   rate and volume, able to articulate logical thoughts, able   to abstract reason, no tangential thoughts, no hallucinations   or delusions  Her affect is flat  RESP: No cough, no audible wheezing, able to talk in full sentences  Remainder of exam unable to be completed due to telephone visits            Assessment/Plan:    Assessment & Plan     Suspected COVID-19 virus infection    Madelyn presents with symptoms of cough, aches, sore throat, and headache since yesterday.  She has been exposed to COVID at work, so this seems also likely to be COVID.  Sore throat is mild without lymphadenopathy and no fever, so unlikely to be Strep.  Will test for COVID.  Advised her to remain isolated until at least test results are back if  negative or at least until 11/11 if test results are positive.  Follow-up as needed.     - Symptomatic COVID-19 Virus (Coronavirus) by PCR; Future       No follow-ups on file.    Presley Paez MD  [facility]    Phone call duration:  6 minutes

## 2020-11-04 ENCOUNTER — AMBULATORY - HEALTHEAST (OUTPATIENT)
Dept: FAMILY MEDICINE | Facility: CLINIC | Age: 25
End: 2020-11-04

## 2020-11-04 DIAGNOSIS — Z20.822 SUSPECTED COVID-19 VIRUS INFECTION: ICD-10-CM

## 2020-11-05 ENCOUNTER — AMBULATORY - HEALTHEAST (OUTPATIENT)
Dept: FAMILY MEDICINE | Facility: CLINIC | Age: 25
End: 2020-11-05

## 2020-11-05 DIAGNOSIS — Z20.822 SUSPECTED COVID-19 VIRUS INFECTION: ICD-10-CM

## 2020-11-06 ENCOUNTER — COMMUNICATION - HEALTHEAST (OUTPATIENT)
Dept: SCHEDULING | Facility: CLINIC | Age: 25
End: 2020-11-06

## 2020-12-13 ENCOUNTER — HEALTH MAINTENANCE LETTER (OUTPATIENT)
Age: 25
End: 2020-12-13

## 2021-04-17 ENCOUNTER — HEALTH MAINTENANCE LETTER (OUTPATIENT)
Age: 26
End: 2021-04-17

## 2021-04-23 LAB
HPV ABSTRACT: NORMAL
PAP-ABSTRACT: ABNORMAL

## 2021-06-12 NOTE — PROGRESS NOTES
COVID-19 PCR test completed. Patient handout For Patients Who Have Been Tested for Covid-19 (Coronavirus) was given to the patient, which includes test result notification process.  
within normal limits

## 2021-07-30 ENCOUNTER — APPOINTMENT (OUTPATIENT)
Dept: FAMILY MEDICINE | Facility: CLINIC | Age: 26
End: 2021-07-30
Payer: COMMERCIAL

## 2021-07-30 ASSESSMENT — ANXIETY QUESTIONNAIRES
7. FEELING AFRAID AS IF SOMETHING AWFUL MIGHT HAPPEN: SEVERAL DAYS
5. BEING SO RESTLESS THAT IT IS HARD TO SIT STILL: SEVERAL DAYS
7. FEELING AFRAID AS IF SOMETHING AWFUL MIGHT HAPPEN: SEVERAL DAYS
6. BECOMING EASILY ANNOYED OR IRRITABLE: MORE THAN HALF THE DAYS
2. NOT BEING ABLE TO STOP OR CONTROL WORRYING: NEARLY EVERY DAY
4. TROUBLE RELAXING: NEARLY EVERY DAY
GAD7 TOTAL SCORE: 14
1. FEELING NERVOUS, ANXIOUS, OR ON EDGE: NEARLY EVERY DAY
8. IF YOU CHECKED OFF ANY PROBLEMS, HOW DIFFICULT HAVE THESE MADE IT FOR YOU TO DO YOUR WORK, TAKE CARE OF THINGS AT HOME, OR GET ALONG WITH OTHER PEOPLE?: SOMEWHAT DIFFICULT
GAD7 TOTAL SCORE: 14
GAD7 TOTAL SCORE: 14
3. WORRYING TOO MUCH ABOUT DIFFERENT THINGS: SEVERAL DAYS

## 2021-07-30 ASSESSMENT — PATIENT HEALTH QUESTIONNAIRE - PHQ9
SUM OF ALL RESPONSES TO PHQ QUESTIONS 1-9: 12
10. IF YOU CHECKED OFF ANY PROBLEMS, HOW DIFFICULT HAVE THESE PROBLEMS MADE IT FOR YOU TO DO YOUR WORK, TAKE CARE OF THINGS AT HOME, OR GET ALONG WITH OTHER PEOPLE: VERY DIFFICULT
SUM OF ALL RESPONSES TO PHQ QUESTIONS 1-9: 12

## 2021-07-31 ASSESSMENT — PATIENT HEALTH QUESTIONNAIRE - PHQ9: SUM OF ALL RESPONSES TO PHQ QUESTIONS 1-9: 12

## 2021-07-31 ASSESSMENT — ANXIETY QUESTIONNAIRES: GAD7 TOTAL SCORE: 14

## 2021-08-06 ENCOUNTER — OFFICE VISIT (OUTPATIENT)
Dept: FAMILY MEDICINE | Facility: CLINIC | Age: 26
End: 2021-08-06
Payer: COMMERCIAL

## 2021-08-06 VITALS
HEIGHT: 63 IN | OXYGEN SATURATION: 98 % | HEART RATE: 77 BPM | TEMPERATURE: 98.6 F | SYSTOLIC BLOOD PRESSURE: 104 MMHG | BODY MASS INDEX: 29.23 KG/M2 | DIASTOLIC BLOOD PRESSURE: 66 MMHG | WEIGHT: 165 LBS | RESPIRATION RATE: 18 BRPM

## 2021-08-06 DIAGNOSIS — F40.01 PANIC DISORDER WITH AGORAPHOBIA: ICD-10-CM

## 2021-08-06 DIAGNOSIS — F33.0 MAJOR DEPRESSIVE DISORDER, RECURRENT EPISODE, MILD (H): ICD-10-CM

## 2021-08-06 DIAGNOSIS — F41.1 GENERALIZED ANXIETY DISORDER: Primary | ICD-10-CM

## 2021-08-06 PROCEDURE — 99214 OFFICE O/P EST MOD 30 MIN: CPT | Performed by: NURSE PRACTITIONER

## 2021-08-06 RX ORDER — LORAZEPAM 0.5 MG/1
.25-.5 TABLET ORAL EVERY 6 HOURS PRN
Qty: 10 TABLET | Refills: 0 | Status: SHIPPED | OUTPATIENT
Start: 2021-08-06

## 2021-08-06 RX ORDER — BUSPIRONE HYDROCHLORIDE 5 MG/1
TABLET ORAL
Qty: 150 TABLET | Refills: 0 | Status: SHIPPED | OUTPATIENT
Start: 2021-08-06 | End: 2022-09-13

## 2021-08-06 RX ORDER — HYDROXYZINE HYDROCHLORIDE 25 MG/1
25-50 TABLET, FILM COATED ORAL 3 TIMES DAILY PRN
Qty: 30 TABLET | Refills: 1 | Status: SHIPPED | OUTPATIENT
Start: 2021-08-06 | End: 2024-09-13

## 2021-08-06 RX ORDER — BUPROPION HYDROCHLORIDE 150 MG/1
150 TABLET ORAL DAILY
COMMUNITY
Start: 2021-05-24 | End: 2021-11-01

## 2021-08-06 ASSESSMENT — PATIENT HEALTH QUESTIONNAIRE - PHQ9
SUM OF ALL RESPONSES TO PHQ QUESTIONS 1-9: 19
SUM OF ALL RESPONSES TO PHQ QUESTIONS 1-9: 19
10. IF YOU CHECKED OFF ANY PROBLEMS, HOW DIFFICULT HAVE THESE PROBLEMS MADE IT FOR YOU TO DO YOUR WORK, TAKE CARE OF THINGS AT HOME, OR GET ALONG WITH OTHER PEOPLE: VERY DIFFICULT

## 2021-08-06 ASSESSMENT — ANXIETY QUESTIONNAIRES
1. FEELING NERVOUS, ANXIOUS, OR ON EDGE: NEARLY EVERY DAY
GAD7 TOTAL SCORE: 15
8. IF YOU CHECKED OFF ANY PROBLEMS, HOW DIFFICULT HAVE THESE MADE IT FOR YOU TO DO YOUR WORK, TAKE CARE OF THINGS AT HOME, OR GET ALONG WITH OTHER PEOPLE?: VERY DIFFICULT
3. WORRYING TOO MUCH ABOUT DIFFERENT THINGS: NEARLY EVERY DAY
7. FEELING AFRAID AS IF SOMETHING AWFUL MIGHT HAPPEN: MORE THAN HALF THE DAYS
2. NOT BEING ABLE TO STOP OR CONTROL WORRYING: NEARLY EVERY DAY
6. BECOMING EASILY ANNOYED OR IRRITABLE: MORE THAN HALF THE DAYS
GAD7 TOTAL SCORE: 15
5. BEING SO RESTLESS THAT IT IS HARD TO SIT STILL: SEVERAL DAYS
GAD7 TOTAL SCORE: 15
7. FEELING AFRAID AS IF SOMETHING AWFUL MIGHT HAPPEN: MORE THAN HALF THE DAYS
4. TROUBLE RELAXING: SEVERAL DAYS

## 2021-08-06 ASSESSMENT — MIFFLIN-ST. JEOR: SCORE: 1454.63

## 2021-08-06 NOTE — PATIENT INSTRUCTIONS
Generalized anxiety disorder  chronic, worsening. Currently on no medications. Recently saw family practice to other facility started on Wellbutrin and Lexapro, stopped both due to not helpful. Has been on multiple antidepressants in the past as per HPI. Has also seen therapy in the past which has been helpful. Has not seen in a while. Discussed importance of counseling, patient agreeable. New referral placed, should be receiving a phone call to schedule, other counseling resources per patient sections as well. Discussed starting on buspirone for anxiety, patient agreeable. Will have patient taper up per instructions. Also okay to use hydroxyzine as needed throughout the day for panic and/or anxiety as well as at bedtime for sleep. Sleep hygiene recommendations also attached patient instructions for patient to review. Patient advised not to take hydroxyzine and lorazepam at the same time. However, does have lorazepam to use very sparingly as needed for panic and anxiety. Follow-up via virtual visit if not and with psychiatry in 3 to 4 weeks.  - MENTAL HEALTH REFERRAL  - Adult; Outpatient Treatment, Psychiatry; Individual/Couples/Family/Group Therapy/Health Psychology; [facility] [phone]; We will contact you to schedule the appointment or please call with any questio...; Future  - busPIRone (BUSPAR) 5 MG tablet; Take 1 tablet (5 mg) by mouth 2 times daily for 5 days, THEN 2 tablets (10 mg) 2 times daily for 5 days, THEN 2 tablets (10 mg) 3 times daily for 20 days.  - hydrOXYzine (ATARAX) 25 MG tablet; Take 1-2 tablets (25-50 mg) by mouth 3 times daily as needed for anxiety (and at bedtime)    Panic disorder with agoraphobia  panic and anxiety as above.  - MENTAL HEALTH REFERRAL  - Adult; Outpatient Treatment, Psychiatry; Individual/Couples/Family/Group Therapy/Health Psychology; [facility] [phone]; We will contact you to schedule the appointment or please call with any  questio...; Future  - busPIRone (BUSPAR) 5 MG tablet; Take 1 tablet (5 mg) by mouth 2 times daily for 5 days, THEN 2 tablets (10 mg) 2 times daily for 5 days, THEN 2 tablets (10 mg) 3 times daily for 20 days.  - LORazepam (ATIVAN) 0.5 MG tablet; Take 0.5-1 tablets (0.25-0.5 mg) by mouth every 6 hours as needed for anxiety  - hydrOXYzine (ATARAX) 25 MG tablet; Take 1-2 tablets (25-50 mg) by mouth 3 times daily as needed for anxiety (and at bedtime)    Major depressive disorder, recurrent episode, mild (H)  chronic, worsening. Anxiety worse. Will start buspirone as above and follow-up with mental health provider for further evaluation and medication.  - MENTAL HEALTH REFERRAL  - Adult; Outpatient Treatment, Psychiatry; Individual/Couples/Family/Group Therapy/Health Psychology; [facility] [phone]; We will contact you to schedule the appointment or please call with any questio...; Future    Other counseling services  [facility] ([place])-- [phone]  [facility] ([place]) -- [phone]  [facility]) -- [phone]  [facility] ([place]) -- [phone]  [facility] ([place], other [facility] as well) -- [phone]  [facility] ([place]) -- [phone]  [facility] ([place], multiple locations) -- [phone]  [facility] ([facility], multiple locations) -- [phone]    Would like you to work on bedtime hygiene    - Shutting screens off 1 hour or 1/2 hour prior to bed  - journal 3 positive things from your day  - meditation or yoga  - deep breathing exercises   - reading a light book   - sleepy time tea  - sleep only as much as you need to feel rested and then get out of bed  - keep a regular sleep schedule  - avoid forcing sleep  - avoid caffeinated beverages after lunch  - avoid alcohol near bedtime  - avoid smoking, especially in the evening  - do not go to bed hungry  - adjust  bedroom environment

## 2021-08-06 NOTE — PROGRESS NOTES
Assessment & Plan     Generalized anxiety disorder  chronic, worsening. Currently on no medications. Recently saw family practice to other facility started on Wellbutrin and Lexapro, stopped both due to not helpful. Has been on multiple antidepressants in the past as per HPI. Has also seen therapy in the past which has been helpful. Has not seen in a while. Discussed importance of counseling, patient agreeable. New referral placed, should be receiving a phone call to schedule, other counseling resources per patient sections as well. Discussed starting on buspirone for anxiety, patient agreeable. Will have patient taper up per instructions. Also okay to use hydroxyzine as needed throughout the day for panic and/or anxiety as well as at bedtime for sleep. Sleep hygiene recommendations also attached patient instructions for patient to review. Patient advised not to take hydroxyzine and lorazepam at the same time. However, does have lorazepam to use very sparingly as needed for panic and anxiety. Follow-up via virtual visit if not and with psychiatry in 3 to 4 weeks.  - MENTAL HEALTH REFERRAL  - Adult; Outpatient Treatment, Psychiatry; Individual/Couples/Family/Group Therapy/Health Psychology; [facility] [phone]; We will contact you to schedule the appointment or please call with any questio...; Future  - busPIRone (BUSPAR) 5 MG tablet; Take 1 tablet (5 mg) by mouth 2 times daily for 5 days, THEN 2 tablets (10 mg) 2 times daily for 5 days, THEN 2 tablets (10 mg) 3 times daily for 20 days.  - hydrOXYzine (ATARAX) 25 MG tablet; Take 1-2 tablets (25-50 mg) by mouth 3 times daily as needed for anxiety (and at bedtime)    Panic disorder with agoraphobia  panic and anxiety as above.  - MENTAL HEALTH REFERRAL  - Adult; Outpatient Treatment, Psychiatry; Individual/Couples/Family/Group Therapy/Health Psychology; [facility] [phone]; We will contact you to schedule the appointment or  please call with any [employer]...; Future  - busPIRone (BUSPAR) 5 MG tablet; Take 1 tablet (5 mg) by mouth 2 times daily for 5 days, THEN 2 tablets (10 mg) 2 times daily for 5 days, THEN 2 tablets (10 mg) 3 times daily for 20 days.  - LORazepam (ATIVAN) 0.5 MG tablet; Take 0.5-1 tablets (0.25-0.5 mg) by mouth every 6 hours as needed for anxiety  - hydrOXYzine (ATARAX) 25 MG tablet; Take 1-2 tablets (25-50 mg) by mouth 3 times daily as needed for anxiety (and at bedtime)    Major depressive disorder, recurrent episode, mild (H)  chronic, worsening. Anxiety worse. Will start buspirone as above and follow-up with mental health provider for further evaluation and medication.  - MENTAL HEALTH REFERRAL  - Adult; Outpatient Treatment, Psychiatry; Individual/Couples/Family/Group Therapy/Health Psychology; [facility] [phone]; We will contact you to schedule the appointment or please call with any [employer]...; Future    [facility]  5 minutes spent on the date of the encounter doing chart review, history and exam, documentation and further activities per the note       Tobacco Cessation:   reports that she has been smoking cigarettes. She has been smoking about 0.50 packs per day. She has never used smokeless tobacco.  Tobacco Cessation Action Plan: Not discussed    See Patient Instructions    Return in about 1 month (around 9/6/2021) for Recheck.    Honey Zimmer, DNP, APRN-CNP   [facility]    Subjective     Madelyn Quintana is a 26 year old female who presents today for the following   health issues:    HPI    Depression and Anxiety Follow-Up    How are you doing with your depression since your last visit? Worsened     How are you doing with your anxiety since your last visit?  Worsened - worse than depression     Are you having other symptoms that might be associated with depression or anxiety? No - panic  attacks     Have you had a significant life event? No     Do you have any concerns with your use of alcohol or other drugs? No    Recently seen by FP at [facility] - started on Wellbutrin and Lexapro  Lexapro made patient shake so much -stopped about a week   Was on Fluoxetine, hydroxyzine and lorazepam most recently otherwise, prior to that was on Zoloft. Was on Zoloft for a long time, ?not sure if it was as effective   Was on Celexa - wasn't effective     Was seeing a counselor, has been a couple of years due to insurance - felt it was helpful   Have trouble falling asleep and staying asleep   Has tried Trazodone, making too tired  Melatonin doesn't work   Took Prazosin - taking max dose prescribed and did help   Feels hydroxyzine was helpful     Patient reports passive suicidal thoughts, no self-harm or plans.  Has a good support system, here with fiancé.  Feels safe.    Social History     Tobacco Use     Smoking status: Current Every Day Smoker     Packs/day: 0.50     Types: Cigarettes     Smokeless tobacco: Never Used     Tobacco comment: 5 cigarettes a day    Substance Use Topics     Alcohol use: Yes     Comment: during past week.     Drug use: No     PHQ 3/21/2019 7/30/2021 8/6/2021   PHQ-9 Total Score 9 12 19   Q9: Thoughts of better off dead/self-harm past 2 weeks Several days Several days More than half the days   F/U: Thoughts of suicide or self-harm No Yes Yes   F/U: Self harm-plan - Yes Yes   F/U: Self-harm action - Yes Yes   F/U: Safety concerns No No No     VICTORIA-7 SCORE 3/21/2019 7/30/2021 8/6/2021   Total Score 5 (mild anxiety) 14 (moderate anxiety) 15 (severe anxiety)   Total Score 5 14 15     Last PHQ-9 8/6/2021   1.  Little interest or pleasure in doing things 1   2.  Feeling down, depressed, or hopeless 3   3.  Trouble falling or staying asleep, or sleeping too much 3   4.  Feeling tired or having little energy 2   5.  Poor appetite or overeating 3   6.  Feeling bad about yourself 2   7.  Trouble  "concentrating 1   8.  Moving slowly or restless 2   Q9: Thoughts of better off dead/self-harm past 2 weeks 2   PHQ-9 Total Score 19   Difficulty at work, home, or with people -   In the past two weeks have you had thoughts of suicide or self harm? Yes   Do you have concerns about your personal safety or the safety of others? No   In the past 2 weeks have you thought about a plan or had intention to harm yourself? Yes   In the past 2 weeks have you acted on these thoughts in any way? Yes     VICTORIA-7  8/6/2021   1. Feeling nervous, anxious, or on edge 3   2. Not being able to stop or control worrying 3   3. Worrying too much about different things 3   4. Trouble relaxing 1   5. Being so restless that it is hard to sit still 1   6. Becoming easily annoyed or irritable 2   7. Feeling afraid, as if something awful might happen 2   VICTORIA-7 Total Score 15   If you checked any problems, how difficult have they made it for you to do your work, take care of things at home, or get along with other people? -       Suicide Assessment Five-step Evaluation and Treatment (SAFE-T)      How many servings of fruits and vegetables do you eat daily?  2-3    On average, how many sweetened beverages do you drink each day (Examples: soda, juice, sweet tea, etc.  Do NOT count diet or artificially sweetened beverages)?   0    How many days per week do you exercise enough to make your heart beat faster? 3 or less    How many minutes a day do you exercise enough to make your heart beat faster? 9 or less    How many days per week do you miss taking your medication? Forgets a couple per week      Review of Systems    Constitutional, HEENT, cardiovascular, pulmonary, gi and gu systems are negative, except as otherwise noted.    Objective   /66   Pulse 77   Temp 98.6  F (37  C)   Resp 18   Ht 1.588 m (5' 2.5\")   Wt 74.8 kg (165 lb)   LMP 08/02/2021   SpO2 98%   Breastfeeding No   BMI 29.70 kg/m    Body mass index is 29.7 " kg/m .    Physical Exam  GENERAL APPEARANCE: healthy, alert and no distress  RESP: lungs clear to auscultation - no rales, rhonchi or wheezes  CV: regular rates and rhythm, normal S1 S2, no S3 or S4 and no murmur, click or rub  ABDOMEN: soft, nontender, without hepatosplenomegaly or masses and bowel sounds normal  MS: extremities normal- no gross deformities noted  SKIN: no suspicious lesions or rashes  NEURO: Normal strength and tone, mentation intact and speech normal  PSYCH: mentation appears normal, affect normal/bright and anxious    Diagnostic Test Results:  none      Chart documentation with Dragon Voice recognition Software. Although reviewed after completion, some words and grammatical errors may remain.

## 2021-08-07 ASSESSMENT — ANXIETY QUESTIONNAIRES: GAD7 TOTAL SCORE: 15

## 2021-09-15 PROBLEM — R87.610 ASCUS OF CERVIX WITH NEGATIVE HIGH RISK HPV: Status: ACTIVE | Noted: 2021-09-15

## 2021-09-26 ENCOUNTER — HEALTH MAINTENANCE LETTER (OUTPATIENT)
Age: 26
End: 2021-09-26

## 2021-11-01 ENCOUNTER — NURSE TRIAGE (OUTPATIENT)
Dept: FAMILY MEDICINE | Facility: CLINIC | Age: 26
End: 2021-11-01

## 2021-11-01 ENCOUNTER — VIRTUAL VISIT (OUTPATIENT)
Dept: FAMILY MEDICINE | Facility: CLINIC | Age: 26
End: 2021-11-01
Payer: COMMERCIAL

## 2021-11-01 ENCOUNTER — LAB (OUTPATIENT)
Dept: FAMILY MEDICINE | Facility: CLINIC | Age: 26
End: 2021-11-01
Attending: FAMILY MEDICINE

## 2021-11-01 DIAGNOSIS — R05.9 COUGH: ICD-10-CM

## 2021-11-01 DIAGNOSIS — J45.31 MILD PERSISTENT ASTHMA WITH ACUTE EXACERBATION: Primary | ICD-10-CM

## 2021-11-01 DIAGNOSIS — J40 BRONCHITIS: ICD-10-CM

## 2021-11-01 PROCEDURE — 99214 OFFICE O/P EST MOD 30 MIN: CPT | Mod: 95 | Performed by: FAMILY MEDICINE

## 2021-11-01 PROCEDURE — U0003 INFECTIOUS AGENT DETECTION BY NUCLEIC ACID (DNA OR RNA); SEVERE ACUTE RESPIRATORY SYNDROME CORONAVIRUS 2 (SARS-COV-2) (CORONAVIRUS DISEASE [COVID-19]), AMPLIFIED PROBE TECHNIQUE, MAKING USE OF HIGH THROUGHPUT TECHNOLOGIES AS DESCRIBED BY CMS-2020-01-R: HCPCS

## 2021-11-01 PROCEDURE — U0005 INFEC AGEN DETEC AMPLI PROBE: HCPCS

## 2021-11-01 RX ORDER — PREDNISONE 20 MG/1
TABLET ORAL
Qty: 10 TABLET | Refills: 0 | Status: SHIPPED | OUTPATIENT
Start: 2021-11-01 | End: 2022-09-13

## 2021-11-01 RX ORDER — AZITHROMYCIN 250 MG/1
TABLET, FILM COATED ORAL
Qty: 6 TABLET | Refills: 0 | Status: SHIPPED | OUTPATIENT
Start: 2021-11-01 | End: 2021-11-06

## 2021-11-01 ASSESSMENT — PAIN SCALES - GENERAL: PAINLEVEL: MODERATE PAIN (4)

## 2021-11-01 NOTE — TELEPHONE ENCOUNTER
"    Reason for Disposition    MILD difficulty breathing (e.g., minimal/no SOB at rest, SOB with walking, pulse < 100) of new onset or worse than normal    Additional Information    Negative: Breathing stopped and hasn't returned    Negative: Choking on something    Negative: SEVERE difficulty breathing (e.g., struggling for each breath, speaks in single words, pulse > 120)    Negative: Bluish (or gray) lips or face    Negative: Difficult to awaken or acting confused (e.g., disoriented, slurred speech)    Negative: Passed out (i.e., fainted, collapsed and was not responding)    Negative: Wheezing started suddenly after medicine, an allergic food, or bee sting    Negative: Stridor    Negative: Slow, shallow and weak breathing    Negative: Sounds like a life-threatening emergency to the triager    Negative: Chest pain    Negative: Wheezing (high pitched whistling sound) and previous asthma attacks or use of asthma medicines    Negative: Difficulty breathing and only present when coughing    Negative: Difficulty breathing and only from stuffy or runny nose    Negative: Difficulty breathing and within 14 days of COVID-19 Exposure    Negative: MODERATE difficulty breathing (e.g., speaks in phrases, SOB even at rest, pulse 100-120) of new onset or worse than normal    Negative: Wheezing can be heard across the room    Negative: Drooling or spitting out saliva (because can't swallow)    Negative: Any history of prior \"blood clot\" in leg or lungs    Negative: Illness requiring prolonged bedrest in past month (e.g., immobilization, long hospital stay)    Negative: Hip or leg fracture (broken bone) in past month (or had cast on leg or ankle in past month)    Negative: Major surgery in the past month    Negative: Long-distance travel in past month (e.g., car, bus, train, plane; with trip lasting 6 or more hours)    Negative: Extra heart beats OR irregular heart beating   (i.e., \"palpitations\")    Negative: Fever > 103 F (39.4 " "C)    Negative: Fever > 101 F (38.3 C) and over 60 years of age    Negative: Fever > 100.0 F (37.8 C) and bedridden (e.g., nursing home patient, stroke, chronic illness, recovering from surgery)    Negative: Fever > 100.0 F (37.8 C) and diabetes mellitus or weak immune system (e.g., HIV positive, cancer chemo, splenectomy, organ transplant, chronic steroids)    Negative: Periods where breathing stops and then resumes normally and bedridden (e.g., nursing home patient, CVA)    Negative: Pregnant or postpartum (from 0 to 6 weeks after delivery)    Negative: Patient sounds very sick or weak to the triager    Answer Assessment - Initial Assessment Questions  1. RESPIRATORY STATUS: \"Describe your breathing?\" (e.g., wheezing, shortness of breath, unable to speak, severe coughing)       Shortness of breath  2. ONSET: \"When did this breathing problem begin?\"       2 days ago  3. PATTERN \"Does the difficult breathing come and go, or has it been constant since it started?\"       constant  4. SEVERITY: \"How bad is your breathing?\" (e.g., mild, moderate, severe)     - MILD: No SOB at rest, mild SOB with walking, speaks normally in sentences, can lay down, no retractions, pulse < 100.     - MODERATE: SOB at rest, SOB with minimal exertion and prefers to sit, cannot lie down flat, speaks in phrases, mild retractions, audible wheezing, pulse 100-120.     - SEVERE: Very SOB at rest, speaks in single words, struggling to breathe, sitting hunched forward, retractions, pulse > 120       mild  5. RECURRENT SYMPTOM: \"Have you had difficulty breathing before?\" If so, ask: \"When was the last time?\" and \"What happened that time?\"       bronchitis  6. CARDIAC HISTORY: \"Do you have any history of heart disease?\" (e.g., heart attack, angina, bypass surgery, angioplasty)       no  7. LUNG HISTORY: \"Do you have any history of lung disease?\"  (e.g., pulmonary embolus, asthma, emphysema)      asthma  8. CAUSE: \"What do you think is causing the " "breathing problem?\"       bronchitis  9. OTHER SYMPTOMS: \"Do you have any other symptoms? (e.g., dizziness, runny nose, cough, chest pain, fever)      Cough, chest heave  10. PREGNANCY: \"Is there any chance you are pregnant?\" \"When was your last menstrual period?\"        n/a  11. TRAVEL: \"Have you traveled out of the country in the last month?\" (e.g., travel history, exposures)        no    Protocols used: BREATHING DIFFICULTY-A-OH      "

## 2021-11-01 NOTE — PROGRESS NOTES
"Reason for Disposition    MILD difficulty breathing (e.g., minimal/no SOB at rest, SOB with walking, pulse < 100) of new onset or worse than normal    Additional Information    Negative: Breathing stopped and hasn't returned    Negative: Choking on something    Negative: SEVERE difficulty breathing (e.g., struggling for each breath, speaks in single words, pulse > 120)    Negative: Bluish (or gray) lips or face    Negative: Difficult to awaken or acting confused (e.g., disoriented, slurred speech)    Negative: Passed out (i.e., fainted, collapsed and was not responding)    Negative: Wheezing started suddenly after medicine, an allergic food, or bee sting    Negative: Stridor    Negative: Slow, shallow and weak breathing    Negative: Sounds like a life-threatening emergency to the triager    Negative: Chest pain    Negative: Wheezing (high pitched whistling sound) and previous asthma attacks or use of asthma medicines    Negative: Difficulty breathing and only present when coughing    Negative: Difficulty breathing and only from stuffy or runny nose    Negative: Difficulty breathing and within 14 days of COVID-19 Exposure    Negative: MODERATE difficulty breathing (e.g., speaks in phrases, SOB even at rest, pulse 100-120) of new onset or worse than normal    Negative: Wheezing can be heard across the room    Negative: Drooling or spitting out saliva (because can't swallow)    Negative: Any history of prior \"blood clot\" in leg or lungs    Negative: Illness requiring prolonged bedrest in past month (e.g., immobilization, long hospital stay)    Negative: Hip or leg fracture (broken bone) in past month (or had cast on leg or ankle in past month)    Negative: Major surgery in the past month    Negative: Long-distance travel in past month (e.g., car, bus, train, plane; with trip lasting 6 or more hours)    Negative: Extra heart beats OR irregular heart beating   (i.e., \"palpitations\")    Negative: Fever > 103 F (39.4 C)    " "Negative: Fever > 101 F (38.3 C) and over 60 years of age    Negative: Fever > 100.0 F (37.8 C) and bedridden (e.g., nursing home patient, stroke, chronic illness, recovering from surgery)    Negative: Fever > 100.0 F (37.8 C) and diabetes mellitus or weak immune system (e.g., HIV positive, cancer chemo, splenectomy, organ transplant, chronic steroids)    Negative: Periods where breathing stops and then resumes normally and bedridden (e.g., nursing home patient, CVA)    Negative: Pregnant or postpartum (from 0 to 6 weeks after delivery)    Negative: Patient sounds very sick or weak to the triager    Answer Assessment - Initial Assessment Questions  1. RESPIRATORY STATUS: \"Describe your breathing?\" (e.g., wheezing, shortness of breath, unable to speak, severe coughing)       Shortness of breath  2. ONSET: \"When did this breathing problem begin?\"       2 days ago  3. PATTERN \"Does the difficult breathing come and go, or has it been constant since it started?\"       constant  4. SEVERITY: \"How bad is your breathing?\" (e.g., mild, moderate, severe)           mild  5. RECURRENT SYMPTOM: \"Have you had difficulty breathing before?\" If so, ask: \"When was the last time?\" and \"What happened that time?\"       bronchitis  6. CARDIAC HISTORY: \"Do you have any history of heart disease?\" (e.g., heart attack, angina, bypass surgery, angioplasty)       no  7. LUNG HISTORY:       asthma  8. CAUSE: \"What do you think is causing the breathing problem?\"       bronchitis  9. OTHER SYMPTOMS: \"Do you have any other symptoms? (e.g., dizziness, runny nose, cough, chest pain, fever)      Cough, chest heave  10. PREGNANCY: \"Is there any chance you are pregnant?\" \"When was your last menstrual period?\"        n/a  11. TRAVEL: \"Have you traveled out of the country in the last month?\" (e.g., travel history, exposures)        no  "

## 2021-11-01 NOTE — PROGRESS NOTES
"Madelyn is a 26 year old who is being evaluated via a billable telephone visit.      What phone number would you like to be contacted at? [phone]  How would you like to obtain your AVS? MyChart    Assessment & Plan     Mild persistent asthma with acute exacerbation  Should have follow up in person  - predniSONE (DELTASONE) 20 MG tablet; Take two tablets (= 40mg) each day for 5 (five) days      Has long stading asthma  No recent er visits.  But has required steroids when she gets ight and inhaler is less effective  Bronchitis    - azithromycin (ZITHROMAX) 250 MG tablet; Take 2 tablets (500 mg) by mouth daily for 1 day, THEN 1 tablet (250 mg) daily for 4 days.    Cough  Will test for   - Symptomatic COVID-19 Virus (Coronavirus) by PCR Nose; Future     BMI:   Estimated body mass index is 29.7 kg/m  as calculated from the following:    Height as of 8/6/21: 1.588 m (5' 2.5\").    Weight as of 8/6/21: 74.8 kg (165 lb).   Weight management plan: Discussed healthy diet and exercise guidelines      No follow-ups on file.    Alphonse Brush MD  [facility]    Kyra Joshi is a 26 year old who presents for the following health issues     HPI       Concern for COVID-19  About how many days ago did these symptoms start? Two days ago  Is this your first visit for this illness? Yes  In the 14 days before your symptoms started, have you had close contact with someone with COVID-19 (Coronavirus)? No  Do you have a fever or chills? Yes, I felt feverish or had chills. She has been having night sweats  Are you having new or worsening difficulty breathing? Yes   Please describe what kind of difficulty you are having breathing:Mild dyspnea (able to do ADLs without difficulty, mild shortness of breath with activities such as climbing one or two flights of stairs or walking briskly)  Do you have new or worsening cough? Yes, I am coughing up mucus.  Have you had any new or unexplained body aches? No    Have you " experienced any of the following NEW symptoms?    Headache: No    Sore throat: YES    Loss of taste or smell: No    Chest pain: YES, she says it feels like something heavy is on her chest    Diarrhea: No    Rash: No  What treatments have you tried? Dayquil  Who do you live with? Her fiance and step daughter  Are you, or a household member, a healthcare worker or a ? 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 No  Do you have a way to get food/medications if quarantined? Yes, I have a friend or family member who can help me.        Review of Systems   Constitutional, HEENT, cardiovascular, pulmonary, gi and gu systems are negative, except as otherwise noted.      Objective           Vitals:  No vitals were obtained today due to virtual visit.    Physical Exam   healthy, alert and no distress  PSYCH: Alert and oriented times 3; coherent speech, normal   rate and volume, able to articulate logical thoughts, able   to abstract reason, no tangential thoughts, no hallucinations   or delusions  Her affect is normal  RESP: No cough, no audible wheezing, able to talk in full sentences  Remainder of exam unable to be completed due to telephone visits              Phone call duration: 8 minutes

## 2021-11-02 LAB — SARS-COV-2 RNA RESP QL NAA+PROBE: NEGATIVE

## 2021-12-28 ENCOUNTER — VIRTUAL VISIT (OUTPATIENT)
Dept: FAMILY MEDICINE | Facility: CLINIC | Age: 26
End: 2021-12-28
Payer: COMMERCIAL

## 2021-12-28 ENCOUNTER — LAB (OUTPATIENT)
Dept: FAMILY MEDICINE | Facility: CLINIC | Age: 26
End: 2021-12-28
Attending: NURSE PRACTITIONER
Payer: COMMERCIAL

## 2021-12-28 DIAGNOSIS — Z20.822 SUSPECTED 2019 NOVEL CORONAVIRUS INFECTION: Primary | ICD-10-CM

## 2021-12-28 DIAGNOSIS — J45.901 REACTIVE AIRWAY DISEASE WITH ACUTE EXACERBATION, UNSPECIFIED ASTHMA SEVERITY, UNSPECIFIED WHETHER PERSISTENT: ICD-10-CM

## 2021-12-28 DIAGNOSIS — Z20.822 SUSPECTED 2019 NOVEL CORONAVIRUS INFECTION: ICD-10-CM

## 2021-12-28 PROCEDURE — 99213 OFFICE O/P EST LOW 20 MIN: CPT | Mod: 95 | Performed by: NURSE PRACTITIONER

## 2021-12-28 PROCEDURE — 99207 PR NO CHARGE LOS: CPT

## 2021-12-28 PROCEDURE — U0003 INFECTIOUS AGENT DETECTION BY NUCLEIC ACID (DNA OR RNA); SEVERE ACUTE RESPIRATORY SYNDROME CORONAVIRUS 2 (SARS-COV-2) (CORONAVIRUS DISEASE [COVID-19]), AMPLIFIED PROBE TECHNIQUE, MAKING USE OF HIGH THROUGHPUT TECHNOLOGIES AS DESCRIBED BY CMS-2020-01-R: HCPCS

## 2021-12-28 PROCEDURE — U0005 INFEC AGEN DETEC AMPLI PROBE: HCPCS

## 2021-12-28 NOTE — PROGRESS NOTES
"Madelyn is a 26 year old who is being evaluated via a billable video visit.      How would you like to obtain your AVS? MyChart  If the video visit is dropped, the invitation should be resent by: Text to cell phone: [phone]  Will anyone else be joining your video visit? No     Video Start Time: 1:38 pm    Assessment & Plan     Suspected 2019 novel coronavirus infection  Discussed testing, red flags and over the counter treatment.    - Symptomatic; Yes; 12/26/2021 COVID-19 Virus (Coronavirus) by PCR    Reactive airway disease with acute exacerbation, unspecified asthma severity, unspecified whether persistent  Refilled. Stable symptoms.    - fluticasone (ARNUITY ELLIPTA) 50 MCG/ACT inhaler  Dispense: 1 each; Refill: 0           Patient Instructions     Patient Education   Discharge Instructions for COVID-19 Patients  You have--or may have--COVID-19. Please follow the instructions listed below.   If you have a weakened immune system, discuss with your doctor any other actions you need to take.  How can I protect others?  If you have symptoms (fever, cough, body aches or trouble breathing):    Stay home and away from others (self-isolate) until:  ? Your other symptoms have resolved (gotten better). And   ? You've had no fever--and no medicine that reduces fever--for 1 full day (24 hours). And   ? At least 10 days have passed since your symptoms started. (You may need to wait 20 days. Follow the advice of your care team.)  If you don't show symptoms, but testing showed that you have COVID-19:    Stay home and away from others (self-isolate) until at least 10 days have passed since the date of your first positive COVID-19 test.  During this time    Stay in your own room, even for meals. Use your own bathroom if you can.    Stay away from others in your home. No hugging, kissing or shaking hands. No visitors.    Don't go to work, school or anywhere else.    Clean \"high touch\" surfaces often (doorknobs, counters, " handles). Use household cleaning spray or wipes.    You'll find a full list of  on the EPA website: www.epa.gov/pesticide-registration/list-n-disinfectants-use-against-sars-cov-2.    Cover your mouth and nose with a mask or other face covering to avoid spreading germs.    Wash your hands and face often. Use soap and water.    Caregivers in these groups are at risk for severe illness due to COVID-19:  ? People 65 years and older  ? People who live in a nursing home or long-term care facility  ? People with chronic disease (lung, heart, cancer, diabetes, kidney, liver, immunologic)  ? People who have a weakened immune system, including those who:    Are in cancer treatment    Take medicine that weakens the immune system, such as corticosteroids    Had a bone marrow or organ transplant    Have an immune deficiency    Have poorly controlled HIV or AIDS    Are obese (body mass index of 40 or higher)    Smoke regularly    Caregivers should wear gloves while washing dishes, handling laundry and cleaning bedrooms and bathrooms.    Use caution when washing and drying laundry: Don't shake dirty laundry and use the warmest water setting that you can.    For more tips on managing your health at home, go to www.cdc.gov/coronavirus/2019-ncov/downloads/10Things.pdf.  How can I take care of myself at home?  1. Get lots of rest. Drink extra fluids (unless a doctor has told you not to).  2. Take Tylenol (acetaminophen) for fever or pain. If you have liver or kidney problems, ask your family doctor if it's okay to take Tylenol.   Adults can take either:   ? 650 mg (two 325 mg pills) every 4 to 6 hours, or   ? 1,000 mg (two 500 mg pills) every 8 hours as needed.  ? Note: Don't take more than 3,000 mg in one day. Acetaminophen is found in many medicines (both prescribed and over-the-counter medicines). Read all labels to be sure you don't take too much.   For children, check the Tylenol bottle for the right dose. The dose is  based on the child's age or weight.  3. If you have other health problems (like cancer, heart failure, an organ transplant or severe kidney disease): Call your specialty clinic if you don't feel better in the next 2 days.  4. Know when to call 911. Emergency warning signs include:  ? Trouble breathing or shortness of breath  ? Pain or pressure in the chest that doesn't go away  ? Feeling confused like you haven't felt before, or not being able to wake up  ? Bluish-colored lips or face  5. Your doctor may have prescribed a blood thinner medicine. Follow their instructions.  Where can I get more information?    [facility] - About COVID-19:   [email or web address]    CDC - What to Do If You're Sick: www.cdc.gov/coronavirus/2019-ncov/about/steps-when-sick.html    [facility] - Ending Home Isolation: www.cdc.gov/coronavirus/2019-ncov/hcp/disposition-in-home-patients.html    [facility] - Caring for Someone: www.cdc.gov/coronavirus/2019-ncov/if-you-are-sick/care-for-someone.html    [facility] - Interim Guidance for Hospital Discharge to Home: www.health.[facility].[place]/diseases/coronavirus/hcp/hospdischarge.pdf    Below are the COVID-19 hotlines at the [facility] ([facility]). Interpreters are available.  ? For health questions: Call 276-442-8662 or 1-705.790.3370 (7 a.m. to 7 p.m.)  ? For questions about schools and childcare: Call 190-050-9680 or 1-955.349.1581 (7 a.m. to 7 p.m.)    For informational purposes only. Not to replace the advice of your health care provider. Clinically reviewed by Dr. Low Hernandez.   Copyright   2020 [employer]. All rights reserved. [employer] 108094 - REV 01/05/21.           No follow-ups on file.    Marla Meredith NP  [facility] YOVANY Joshi is a 26 year old who presents for the following health issues     HPI       Concern for COVID-19  About how many days ago did these symptoms start?  Symptoms started yesterday ,exposed  at work  Sore  Throat,headache  Is this your first visit for this illness? Yes  In the 14 days before your symptoms started, have you had close contact with someone with COVID-19 (Coronavirus)? Yes, I have been in contact with someone who has COVID-19/Coronavirus (confirmed by lab test). Was in contact on 12/25 and 12/26.  Do you have a fever or chills? No  Are you having new or worsening difficulty breathing? no    Do you have new or worsening cough? mild cough  Have you had any new or unexplained body aches? YES    Have you experienced any of the following NEW symptoms?    Headache: YES    Sore throat: YES    Loss of taste or smell: No    Chest pain: No    Diarrhea: No    Rash: No  What treatments have you tried? none  Who do you live with? Step daughter and her father  Are you, or a household member, a healthcare worker or a ? 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  Do you have a way to get food/medications if quarantined? Yes, I have a friend or family member who can help me.    No fever/chills  No shortness of breath  Slight dry cough - mild.    History of asthma - has inhaler.      Review of Systems   Constitutional, HEENT, cardiovascular, pulmonary, GI, , musculoskeletal, neuro, skin, endocrine and psych systems are negative, except as otherwise noted.      Objective           Vitals:  No vitals were obtained today due to virtual visit.    Physical Exam   GENERAL: Healthy, alert and no distress  EYES: Eyes grossly normal to inspection.  No discharge or erythema, or obvious scleral/conjunctival abnormalities.  RESP: No audible wheeze, cough, or visible cyanosis.  No visible retractions or increased work of breathing.    SKIN: Visible skin clear. No significant rash, abnormal pigmentation or lesions.  NEURO: Cranial nerves grossly intact.  Mentation and speech appropriate for age.  PSYCH: Mentation appears normal,  affect normal/bright, judgement and insight intact, normal speech and appearance well-groomed.      Video-Visit Details    Type of service:  Video Visit    Video End Time:1:52 PM    Originating Location (pt. Location): Home    Distant Location (provider location):  [facility]     Platform used for Video Visit: [employer]

## 2021-12-28 NOTE — LETTER
December 31, 2021      Madelyn Cortezin  [address] [place] [place] [address]        Dear ,    We are writing to inform you of your test results.    COVID testing is negative.     Resulted Orders   Symptomatic; Yes; 12/26/2021 COVID-19 Virus (Coronavirus) by PCR Nose   Result Value Ref Range    SARS CoV2 PCR Negative Negative, Testing sent to reference lab. Results will be returned via unsolicited result      Comment:      NEGATIVE: SARS-CoV-2 (COVID-19) RNA not detected, presumed negative.    Narrative    Testing was performed using the Xpert Xpress SARS-CoV-2 Assay on the  Cepheid Gene-Xpert Instrument Systems. Additional information about  this Emergency Use Authorization (EUA) assay can be found via the Lab  Guide. This test should be ordered for the detection of SARS-CoV-2 in  individuals who meet SARS-CoV-2 clinical and/or epidemiological  criteria. Test performance is unknown in asymptomatic patients. This  test is for in vitro diagnostic use under the FDA EUA for  laboratories certified under CLIA to perform high complexity testing.  This test has not been FDA cleared or approved. A negative result  does not rule out the presence of PCR inhibitors in the specimen or  target RNA in concentration below the limit of detection for the  assay. The possibility of a false negative should be considered if  the patient's recent exposure or clinical presentation suggests  COVID-19. This test was validated by the [facility]. This laboratory is certified under  the Clinical Laboratory Improvement Amendments of 1988 (CLIA-88) as  qualified to perform high complexity laboratory testing.         If you have any questions or concerns, please call the clinic at the number listed above.       Sincerely,      Marla Meredith NP

## 2021-12-28 NOTE — PATIENT INSTRUCTIONS
"  Patient Education   Discharge Instructions for COVID-19 Patients  You have--or may have--COVID-19. Please follow the instructions listed below.   If you have a weakened immune system, discuss with your doctor any other actions you need to take.  How can I protect others?  If you have symptoms (fever, cough, body aches or trouble breathing):    Stay home and away from others (self-isolate) until:  ? Your other symptoms have resolved (gotten better). And   ? You've had no fever--and no medicine that reduces fever--for 1 full day (24 hours). And   ? At least 10 days have passed since your symptoms started. (You may need to wait 20 days. Follow the advice of your care team.)  If you don't show symptoms, but testing showed that you have COVID-19:    Stay home and away from others (self-isolate) until at least 10 days have passed since the date of your first positive COVID-19 test.  During this time    Stay in your own room, even for meals. Use your own bathroom if you can.    Stay away from others in your home. No hugging, kissing or shaking hands. No visitors.    Don't go to work, school or anywhere else.    Clean \"high touch\" surfaces often (doorknobs, counters, handles). Use household cleaning spray or wipes.    You'll find a full list of  on the EPA website: www.epa.gov/pesticide-registration/list-n-disinfectants-use-against-sars-cov-2.    Cover your mouth and nose with a mask or other face covering to avoid spreading germs.    Wash your hands and face often. Use soap and water.    Caregivers in these groups are at risk for severe illness due to COVID-19:  ? People 65 years and older  ? People who live in a nursing home or long-term care facility  ? People with chronic disease (lung, heart, cancer, diabetes, kidney, liver, immunologic)  ? People who have a weakened immune system, including those who:    Are in cancer treatment    Take medicine that weakens the immune system, such as corticosteroids    Had a " bone marrow or organ transplant    Have an immune deficiency    Have poorly controlled HIV or AIDS    Are obese (body mass index of 40 or higher)    Smoke regularly    Caregivers should wear gloves while washing dishes, handling laundry and cleaning bedrooms and bathrooms.    Use caution when washing and drying laundry: Don't shake dirty laundry and use the warmest water setting that you can.    For more tips on managing your health at home, go to www.cdc.gov/coronavirus/2019-ncov/downloads/10Things.pdf.  How can I take care of myself at home?  1. Get lots of rest. Drink extra fluids (unless a doctor has told you not to).  2. Take Tylenol (acetaminophen) for fever or pain. If you have liver or kidney problems, ask your family doctor if it's okay to take Tylenol.   Adults can take either:   ? 650 mg (two 325 mg pills) every 4 to 6 hours, or   ? 1,000 mg (two 500 mg pills) every 8 hours as needed.  ? Note: Don't take more than 3,000 mg in one day. Acetaminophen is found in many medicines (both prescribed and over-the-counter medicines). Read all labels to be sure you don't take too much.   For children, check the Tylenol bottle for the right dose. The dose is based on the child's age or weight.  3. If you have other health problems (like cancer, heart failure, an organ transplant or severe kidney disease): Call your specialty clinic if you don't feel better in the next 2 days.  4. Know when to call 911. Emergency warning signs include:  ? Trouble breathing or shortness of breath  ? Pain or pressure in the chest that doesn't go away  ? Feeling confused like you haven't felt before, or not being able to wake up  ? Bluish-colored lips or face  5. Your doctor may have prescribed a blood thinner medicine. Follow their instructions.  Where can I get more information?     [facility] - About COVID-19:   [email or web address]    CDC - What to Do If You're Sick:  www.cdc.gov/coronavirus/2019-ncov/about/steps-when-sick.html    CDC - Ending Home Isolation: www.cdc.gov/coronavirus/2019-ncov/hcp/disposition-in-home-patients.html    CDC - Caring for Someone: www.cdc.gov/coronavirus/2019-ncov/if-you-are-sick/care-for-someone.html    [facility] - Interim Guidance for Hospital Discharge to Home: www.health.[facility].mn./diseases/coronavirus/hcp/hospdischarge.pdf    Below are the COVID-19 hotlines at the [place] [facility] ([facility]). Interpreters are available.  ? For health questions: Call 523-319-1384 or 1-298.853.7033 (7 a.m. to 7 p.m.)  ? For questions about schools and childcare: Call 478-456-0074 or 1-605.375.5566 (7 a.m. to 7 p.m.)    For informational purposes only. Not to replace the advice of your health care provider. Clinically reviewed by Dr. Low Hernandez.   Copyright   2020 [facility]. All rights reserved. [employer] 236081 - REV 01/05/21.

## 2021-12-29 LAB — SARS-COV-2 RNA RESP QL NAA+PROBE: NEGATIVE

## 2022-01-10 DIAGNOSIS — N91.2 ABSENCE OF MENSTRUATION: Primary | ICD-10-CM

## 2022-01-11 ENCOUNTER — LAB (OUTPATIENT)
Dept: LAB | Facility: CLINIC | Age: 27
End: 2022-01-11
Payer: COMMERCIAL

## 2022-01-11 DIAGNOSIS — N91.2 ABSENCE OF MENSTRUATION: ICD-10-CM

## 2022-01-11 LAB
B-HCG SERPL-ACNC: <1 IU/L (ref 0–5)
PROGEST SERPL-MCNC: 2.4 NG/ML

## 2022-01-11 PROCEDURE — 36415 COLL VENOUS BLD VENIPUNCTURE: CPT

## 2022-01-11 PROCEDURE — 84702 CHORIONIC GONADOTROPIN TEST: CPT

## 2022-01-11 PROCEDURE — 84144 ASSAY OF PROGESTERONE: CPT

## 2022-04-27 ENCOUNTER — TELEPHONE (OUTPATIENT)
Dept: FAMILY MEDICINE | Facility: CLINIC | Age: 27
End: 2022-04-27
Payer: COMMERCIAL

## 2022-04-27 NOTE — TELEPHONE ENCOUNTER
Patient Quality Outreach    Patient is due for the following:   Asthma  -  ACT needed  Depression  -  PHQ-9 Needed    NEXT STEPS:   sent    Type of outreach:    Sent [employer] message.    Next Steps:  Reach out within 90 days via [employer].    Max number of attempts reached: No. Will try again in 90 days if patient still on fail list.    Questions for provider review:    None     Denise Mtz, [facility]  Chart routed to Care Team.

## 2022-05-08 ENCOUNTER — HEALTH MAINTENANCE LETTER (OUTPATIENT)
Age: 27
End: 2022-05-08

## 2022-06-01 ENCOUNTER — TELEPHONE (OUTPATIENT)
Dept: FAMILY MEDICINE | Facility: CLINIC | Age: 27
End: 2022-06-01
Payer: COMMERCIAL

## 2022-06-01 NOTE — TELEPHONE ENCOUNTER
Patient Quality Outreach    Patient is due for the following:   Asthma  -  ACT needed  Depression  -  PHQ-9 Needed    NEXT STEPS:   mychart sent    Type of outreach:    Sent [employer] message.    Next Steps:  Reach out within 90 days via Phone.    Max number of attempts reached: No. Will try again in 90 days if patient still on fail list.    Questions for provider review:    None     Denise Mtz, [facility]  Chart routed to Care Team.

## 2022-08-27 ENCOUNTER — HOSPITAL ENCOUNTER (EMERGENCY)
Facility: CLINIC | Age: 27
Discharge: HOME | End: 2022-08-27
Attending: PHYSICIAN ASSISTANT | Admitting: PHYSICIAN ASSISTANT
Payer: COMMERCIAL

## 2022-08-27 VITALS
HEART RATE: 74 BPM | DIASTOLIC BLOOD PRESSURE: 84 MMHG | OXYGEN SATURATION: 97 % | SYSTOLIC BLOOD PRESSURE: 142 MMHG | TEMPERATURE: 97.9 F | RESPIRATION RATE: 18 BRPM

## 2022-08-27 DIAGNOSIS — J02.9 ACUTE PHARYNGITIS, UNSPECIFIED ETIOLOGY: ICD-10-CM

## 2022-08-27 DIAGNOSIS — J06.9 URI (UPPER RESPIRATORY INFECTION): ICD-10-CM

## 2022-08-27 LAB
DEPRECATED S PYO AG THROAT QL EIA: NEGATIVE
FLUAV RNA SPEC QL NAA+PROBE: NEGATIVE
FLUBV RNA RESP QL NAA+PROBE: NEGATIVE
SARS-COV-2 RNA RESP QL NAA+PROBE: NEGATIVE

## 2022-08-27 PROCEDURE — C9803 HOPD COVID-19 SPEC COLLECT: HCPCS | Performed by: PHYSICIAN ASSISTANT

## 2022-08-27 PROCEDURE — 87651 STREP A DNA AMP PROBE: CPT | Performed by: NURSE PRACTITIONER

## 2022-08-27 PROCEDURE — 87636 SARSCOV2 & INF A&B AMP PRB: CPT | Performed by: NURSE PRACTITIONER

## 2022-08-27 PROCEDURE — 99213 OFFICE O/P EST LOW 20 MIN: CPT | Mod: CS | Performed by: PHYSICIAN ASSISTANT

## 2022-08-27 PROCEDURE — G0463 HOSPITAL OUTPT CLINIC VISIT: HCPCS | Performed by: PHYSICIAN ASSISTANT

## 2022-08-27 RX ORDER — NORGESTIMATE AND ETHINYL ESTRADIOL 0.25-0.035
1 KIT ORAL DAILY
COMMUNITY
Start: 2021-04-23 | End: 2023-02-02

## 2022-08-27 ASSESSMENT — ENCOUNTER SYMPTOMS
COUGH: 1
SORE THROAT: 1
FEVER: 1
RHINORRHEA: 1

## 2022-08-27 NOTE — LETTER
August 27, 2022      To Whom It May Concern:      Madelyn Quintana was seen in our Emergency Department today, 08/27/22.  Please excuse from work last week due to illness.  Thank you.      Sincerely,        Rossy Villavicencio PA-C

## 2022-08-28 LAB — GROUP A STREP BY PCR: NOT DETECTED

## 2022-08-28 NOTE — ED PROVIDER NOTES
History     Chief Complaint   Patient presents with     Pharyngitis     Pt had a cough, runny nose, sore throat, headache, fever starting on 8/17. Pt states that the cough and congestion got better but her sore throat is worse. Tested negative at home for covid.      HPI  Madelyn Quintana is a 27 year old female who presents with complaints of sore throat, cough, runny nose, headache, and fever starting on 8/17/2022.  Patient states that the cough and congestion have improved but her sore throat has persisted.  She also noticed red spots to the back of her throat.  Patient has tested negative for COVID-19 at home.  Denies fevers, chills, sinus pressure, nausea, vomiting, diarrhea, abdominal pain, chest pain, or shortness of breath.  Patient is vaccinated against COVID-19 x2.  She has not tried any over-the-counter medications at home.      Allergies:  Allergies   Allergen Reactions     Lactose Diarrhea     Intolerance  Other reaction(s): GI intolerance  Intolerance  Intolerance       Problem List:    Patient Active Problem List    Diagnosis Date Noted     ASCUS of cervix with negative high risk HPV 09/15/2021     Priority: Medium     2017 NIL Pap  4/23/21 ASCUS pap, Neg HPV. Plan cotest in 3 years. 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    Family History:    Family History   Problem Relation Age of Onset     Unknown/Adopted Paternal Grandmother      Unknown/Adopted Paternal Grandfather      Unknown/Adopted Maternal Grandfather        Social History:  Marital Status:  Single [1]  Social History     Tobacco Use     Smoking status: Former Smoker     Packs/day: 0.50     Types: Cigarettes     Smokeless tobacco: Never Used     Tobacco comment: 5 cigarettes a day    Vaping Use     Vaping Use: Never used   Substance Use Topics     Alcohol use: Yes     Comment: during past week.     Drug use: No        Medications:    norgestimate-ethinyl estradiol (ORTHO-CYCLEN) 0.25-35 MG-MCG tablet  albuterol (VENTOLIN HFA) 108 (90 Base) MCG/ACT inhaler  busPIRone (BUSPAR) 5 MG tablet  fluocinonide (LIDEX) 0.05 % external cream  fluticasone (ARNUITY ELLIPTA) 50 MCG/ACT inhaler  fluticasone (FLONASE) 50 MCG/ACT spray  hydrOXYzine (ATARAX) 25 MG tablet  hydrOXYzine (VISTARIL) 50 MG capsule  LORazepam (ATIVAN) 0.5 MG tablet  predniSONE (DELTASONE) 20 MG tablet          Review of Systems   Constitutional: Positive for fever (resolved).   HENT: Positive for congestion, rhinorrhea and sore throat.    Respiratory: Positive for cough.    Skin: Negative.    All other systems reviewed and are negative.      Physical Exam   BP: (!) 142/84  Pulse: 74  Temp: 97.9  F (36.6  C)  Resp: 18  SpO2: 97 %      Physical Exam  Constitutional:       General: She is not in acute distress.     Appearance: Normal appearance. She is well-developed. She is not ill-appearing, toxic-appearing or diaphoretic.   HENT:      Head: Normocephalic and atraumatic.      Right Ear: Tympanic membrane, ear canal and external ear normal.      Left Ear: Tympanic membrane, ear canal and external ear normal.      Nose: Congestion and rhinorrhea present.      Mouth/Throat:      Lips: Pink.      Mouth: Mucous membranes are moist.      Pharynx: Uvula midline. Posterior  oropharyngeal erythema present. No pharyngeal swelling, oropharyngeal exudate or uvula swelling.      Tonsils: No tonsillar exudate or tonsillar abscesses.   Eyes:      Extraocular Movements: Extraocular movements intact.      Conjunctiva/sclera: Conjunctivae normal.      Pupils: Pupils are equal, round, and reactive to light.   Cardiovascular:      Rate and Rhythm: Normal rate and regular rhythm.      Heart sounds: Normal heart sounds.   Pulmonary:      Effort: Pulmonary effort is normal. No respiratory distress.      Breath sounds: Normal breath sounds. No stridor. No wheezing, rhonchi or rales.   Musculoskeletal:         General: Normal range of motion.      Cervical back: Full passive range of motion without pain, normal range of motion and neck supple. No rigidity. Normal range of motion.   Lymphadenopathy:      Cervical: No cervical adenopathy.   Skin:     General: Skin is warm and dry.   Neurological:      Mental Status: She is alert and oriented to person, place, and time.   Psychiatric:         Behavior: Behavior is cooperative.         ED Course     Procedures    Results for orders placed or performed during the hospital encounter of 08/27/22 (from the past 24 hour(s))   Symptomatic; Auto-generated order Influenza A/B & SARS-CoV2 (COVID-19) Virus PCR Multiplex Nasopharyngeal    Specimen: Nasopharyngeal; Swab   Result Value Ref Range    Influenza A PCR Negative Negative    Influenza B PCR Negative Negative    SARS CoV2 PCR Negative Negative    Narrative    Testing was performed using the alfredo SARS-CoV-2 & Influenza A/B Assay on the alfredo Em[employer] System. This test should be ordered for the detection of SARS-CoV-2 and influenza viruses in individuals who meet clinical and/or epidemiological criteria. Test performance is unknown in asymptomatic patients. This test is for in vitro diagnostic use under the FDA EUA for laboratories certified under CLIA to perform moderate and/or high complexity testing. This test  has not been FDA cleared or approved. A negative result does not rule out the presence of PCR inhibitors in the specimen or target RNA in concentration below the limit of detection for the assay. If only one viral target is positive but coinfection with multiple targets is suspected, the sample should be re-tested with another FDA cleared, approved or authorized test, if coinfection would change clinical management. [facility] are certified under the Clinical Laboratory Improvement Amendments of 1988 (CLIA-88) as  qualified to perform moderate and/or high complexity laboratory testing.   Streptococcus A Rapid Scr w Reflx to PCR    Specimen: Throat; Swab   Result Value Ref Range    Group A Strep antigen Negative Negative       Medications - No data to display    Assessments & Plan (with Medical Decision Making)     Pt is a 27 year old female who presents with complaints of sore throat, cough, runny nose, headache, and fever starting on 8/17/2022.  Patient states that the cough and congestion have improved but her sore throat has persisted.  She also noticed red spots to the back of her throat.  Patient has tested negative for COVID-19 at home.      Pt is afebrile on arrival.  Exam as above.  Patient is not hypoxic.  Lungs are clear on exam.  Rapid strep was negative.  Culture is pending.  COVID-19 testing was negative.  Discussed results with patient.  Encouraged symptomatic treatments at home.  Return precautions were reviewed.  Hand-outs were provided.    Patient was instructed to follow-up with PCP in 3-5 days for continued care and management or sooner if new or worsening symptoms.  She is to return to the ED for persistent and/or worsening symptoms.  Patient expressed understanding of the diagnosis and plan and was discharged home in good condition.    I have reviewed the nursing notes.    I have reviewed the findings, diagnosis, plan and need for follow up with the patient.    Discharge  Medication List as of 8/27/2022  8:32 PM          Final diagnoses:   URI (upper respiratory infection)   Acute pharyngitis, unspecified etiology       8/27/2022   [facility] EMERGENCY DEPT      Disclaimer:  This note consists of symbols derived from keyboarding, dictation and/or voice recognition software.  As a result, there may be errors in the script that have gone undetected.  Please consider this when interpreting information found in this chart.     Rossy Villavicencio PA-C  08/27/22 2053

## 2022-09-13 ENCOUNTER — VIRTUAL VISIT (OUTPATIENT)
Dept: FAMILY MEDICINE | Facility: CLINIC | Age: 27
End: 2022-09-13
Payer: COMMERCIAL

## 2022-09-13 DIAGNOSIS — Z11.59 NEED FOR HEPATITIS C SCREENING TEST: ICD-10-CM

## 2022-09-13 DIAGNOSIS — J20.9 ACUTE BRONCHITIS WITH SYMPTOMS > 10 DAYS: Primary | ICD-10-CM

## 2022-09-13 PROCEDURE — 99213 OFFICE O/P EST LOW 20 MIN: CPT | Mod: TEL | Performed by: NURSE PRACTITIONER

## 2022-09-13 RX ORDER — AZITHROMYCIN 250 MG/1
TABLET, FILM COATED ORAL
Qty: 6 TABLET | Refills: 0 | Status: SHIPPED | OUTPATIENT
Start: 2022-09-13 | End: 2022-09-18

## 2022-09-13 NOTE — LETTER
September 13, 2022      Madelyn Quintana  [address] [place] [address]        To Whom It May Concern:    Madelyn Quintana  was seen on 9/13/2022.  Please excuse her from missed work 9/12-9/13/2022 due to acute illness. She may return 9/14/2022 if her symptoms are improved and she has been fever free for 24 hours without medications.        Sincerely,          NATHANAEL Pineda CNP

## 2022-09-13 NOTE — PROGRESS NOTES
Madelyn is a 27 year old who is being evaluated via a billable telephone visit.      What phone number would you like to be contacted at? [phone]  How would you like to obtain your AVS? MyChart    Assessment & Plan     Acute bronchitis with symptoms > 10 days    - azithromycin (ZITHROMAX) 250 MG tablet; Take 2 tablets (500 mg) by mouth daily for 1 day, THEN 1 tablet (250 mg) daily for 4 days.    Need for hepatitis C screening test    - Hepatitis C Screen Reflex to HCV RNA Quant and Genotype; Future             FUTURE APPOINTMENTS:       - Follow up in 3 days for symptoms that are not improving, sooner for new or worsening sx.     See Patient Instructions    No follow-ups on file.    NATHANAEL Pineda CNP  [facility]   Madelyn is a 27 year old, presenting for the following health issues:  URI      HPI     Acute Illness  Acute illness concerns: cough, sore throat, fever, tested 20 days ago and both tests were negative    Onset/Duration: off and on for a month   Symptoms:  Fever: YES- 101.2 last night   Chills/Sweats: No  Headache (location?): No  Sinus Pressure: No  Conjunctivitis:  No  Ear Pain: no  Rhinorrhea: YES  Congestion: YES- chest   Sore Throat: YES  Cough: YES-productive of yellow sputum  Wheeze: No  Decreased Appetite: YES  Nausea: No  Vomiting: No  Diarrhea: YES  Dysuria/Freq.: No  Dysuria or Hematuria: No  Fatigue/Achiness: YES  Sick/Strep Exposure: No  Therapies tried and outcome: robitussin, mucinex-helps her sleep         Review of Systems   Constitutional, HEENT, cardiovascular, pulmonary, gi and gu systems are negative, except as otherwise noted.      Objective           Vitals:  No vitals were obtained today due to virtual visit.    Physical Exam   alert, no distress and fatigued  PSYCH: Alert and oriented times 3; coherent speech, normal   rate and volume, able to articulate logical thoughts, able   to abstract reason, no tangential thoughts, no  hallucinations   or delusions  Her affect is normal and pleasant  RESP: Audible congested cough, no audible wheezing, able to talk in full sentences  Remainder of exam unable to be completed due to telephone visits                Phone call duration: 8 minutes

## 2022-10-12 ENCOUNTER — MYC MEDICAL ADVICE (OUTPATIENT)
Dept: FAMILY MEDICINE | Facility: CLINIC | Age: 27
End: 2022-10-12

## 2022-10-12 DIAGNOSIS — L20.82 FLEXURAL ECZEMA: ICD-10-CM

## 2022-10-12 RX ORDER — FLUOCINONIDE 0.5 MG/G
CREAM TOPICAL
Qty: 30 G | Refills: 1 | Status: SHIPPED | OUTPATIENT
Start: 2022-10-12 | End: 2024-09-16

## 2022-10-12 NOTE — TELEPHONE ENCOUNTER
Honey,    Please see my chart, the medication was last prescribed in 2018 by Dr. Max, if appointment is needed, ok for E-visit? Virtual?      ROGELIO Raines

## 2022-10-31 ENCOUNTER — HOSPITAL ENCOUNTER (EMERGENCY)
Facility: CLINIC | Age: 27
Discharge: LEFT BEFORE DISCHARGE | End: 2022-10-31
Payer: COMMERCIAL

## 2022-11-01 ENCOUNTER — APPOINTMENT (OUTPATIENT)
Dept: GENERAL RADIOLOGY | Facility: CLINIC | Age: 27
End: 2022-11-01
Attending: EMERGENCY MEDICINE
Payer: COMMERCIAL

## 2022-11-01 ENCOUNTER — APPOINTMENT (OUTPATIENT)
Dept: CT IMAGING | Facility: CLINIC | Age: 27
End: 2022-11-01
Attending: EMERGENCY MEDICINE
Payer: COMMERCIAL

## 2022-11-01 ENCOUNTER — OFFICE VISIT (OUTPATIENT)
Dept: URGENT CARE | Facility: URGENT CARE | Age: 27
End: 2022-11-01
Payer: COMMERCIAL

## 2022-11-01 ENCOUNTER — HOSPITAL ENCOUNTER (EMERGENCY)
Facility: CLINIC | Age: 27
Discharge: HOME | End: 2022-11-01
Attending: EMERGENCY MEDICINE | Admitting: EMERGENCY MEDICINE
Payer: COMMERCIAL

## 2022-11-01 ENCOUNTER — PATIENT OUTREACH (OUTPATIENT)
Dept: FAMILY MEDICINE | Facility: CLINIC | Age: 27
End: 2022-11-01

## 2022-11-01 ENCOUNTER — NURSE TRIAGE (OUTPATIENT)
Dept: NURSING | Facility: CLINIC | Age: 27
End: 2022-11-01

## 2022-11-01 VITALS
DIASTOLIC BLOOD PRESSURE: 75 MMHG | SYSTOLIC BLOOD PRESSURE: 124 MMHG | OXYGEN SATURATION: 96 % | TEMPERATURE: 97.7 F | HEART RATE: 94 BPM

## 2022-11-01 VITALS
RESPIRATION RATE: 16 BRPM | OXYGEN SATURATION: 99 % | SYSTOLIC BLOOD PRESSURE: 112 MMHG | HEART RATE: 70 BPM | DIASTOLIC BLOOD PRESSURE: 76 MMHG

## 2022-11-01 DIAGNOSIS — R11.2 NAUSEA AND VOMITING, UNSPECIFIED VOMITING TYPE: ICD-10-CM

## 2022-11-01 DIAGNOSIS — S09.90XA CLOSED HEAD INJURY, INITIAL ENCOUNTER: Primary | ICD-10-CM

## 2022-11-01 DIAGNOSIS — S02.2XXA CLOSED FRACTURE OF NASAL BONE, INITIAL ENCOUNTER: ICD-10-CM

## 2022-11-01 LAB — HCG UR QL: NEGATIVE

## 2022-11-01 PROCEDURE — 250N000013 HC RX MED GY IP 250 OP 250 PS 637: Performed by: EMERGENCY MEDICINE

## 2022-11-01 PROCEDURE — 70486 CT MAXILLOFACIAL W/O DYE: CPT

## 2022-11-01 PROCEDURE — 99284 EMERGENCY DEPT VISIT MOD MDM: CPT | Performed by: EMERGENCY MEDICINE

## 2022-11-01 PROCEDURE — 72125 CT NECK SPINE W/O DYE: CPT

## 2022-11-01 PROCEDURE — 99214 OFFICE O/P EST MOD 30 MIN: CPT | Performed by: PHYSICIAN ASSISTANT

## 2022-11-01 PROCEDURE — 96372 THER/PROPH/DIAG INJ SC/IM: CPT | Performed by: EMERGENCY MEDICINE

## 2022-11-01 PROCEDURE — 81025 URINE PREGNANCY TEST: CPT | Performed by: EMERGENCY MEDICINE

## 2022-11-01 PROCEDURE — 70450 CT HEAD/BRAIN W/O DYE: CPT

## 2022-11-01 PROCEDURE — 99285 EMERGENCY DEPT VISIT HI MDM: CPT | Mod: 25 | Performed by: EMERGENCY MEDICINE

## 2022-11-01 PROCEDURE — 73562 X-RAY EXAM OF KNEE 3: CPT | Mod: LT

## 2022-11-01 PROCEDURE — 250N000011 HC RX IP 250 OP 636: Performed by: EMERGENCY MEDICINE

## 2022-11-01 RX ORDER — LORAZEPAM 0.5 MG/1
0.5 TABLET ORAL ONCE
Status: COMPLETED | OUTPATIENT
Start: 2022-11-01 | End: 2022-11-01

## 2022-11-01 RX ORDER — ACETAMINOPHEN 500 MG
500 TABLET ORAL ONCE
Status: DISCONTINUED | OUTPATIENT
Start: 2022-11-01 | End: 2022-11-01

## 2022-11-01 RX ORDER — TRAZODONE HYDROCHLORIDE 100 MG/1
100 TABLET ORAL AT BEDTIME
COMMUNITY
End: 2023-04-18

## 2022-11-01 RX ORDER — KETOROLAC TROMETHAMINE 30 MG/ML
30 INJECTION, SOLUTION INTRAMUSCULAR; INTRAVENOUS ONCE
Status: COMPLETED | OUTPATIENT
Start: 2022-11-01 | End: 2022-11-01

## 2022-11-01 RX ORDER — ONDANSETRON 4 MG/1
4 TABLET, ORALLY DISINTEGRATING ORAL ONCE
Status: COMPLETED | OUTPATIENT
Start: 2022-11-01 | End: 2022-11-01

## 2022-11-01 RX ORDER — ACETAMINOPHEN 500 MG
1000 TABLET ORAL ONCE
Status: COMPLETED | OUTPATIENT
Start: 2022-11-01 | End: 2022-11-01

## 2022-11-01 RX ORDER — OXYCODONE AND ACETAMINOPHEN 5; 325 MG/1; MG/1
1 TABLET ORAL EVERY 6 HOURS PRN
Qty: 12 TABLET | Refills: 0 | Status: SHIPPED | OUTPATIENT
Start: 2022-11-01 | End: 2022-11-07

## 2022-11-01 RX ORDER — PRAZOSIN HYDROCHLORIDE 1 MG/1
CAPSULE ORAL
COMMUNITY
Start: 2021-04-23 | End: 2023-03-03

## 2022-11-01 RX ORDER — SERTRALINE HYDROCHLORIDE 100 MG/1
100 TABLET, FILM COATED ORAL DAILY
COMMUNITY
End: 2023-04-18

## 2022-11-01 RX ORDER — KETOROLAC TROMETHAMINE 30 MG/ML
30 INJECTION, SOLUTION INTRAMUSCULAR; INTRAVENOUS ONCE
Status: DISCONTINUED | OUTPATIENT
Start: 2022-11-01 | End: 2022-11-01

## 2022-11-01 RX ORDER — OXYCODONE HYDROCHLORIDE 5 MG/1
10 TABLET ORAL ONCE
Status: COMPLETED | OUTPATIENT
Start: 2022-11-01 | End: 2022-11-01

## 2022-11-01 RX ADMIN — ONDANSETRON 4 MG: 4 TABLET, ORALLY DISINTEGRATING ORAL at 18:15

## 2022-11-01 RX ADMIN — KETOROLAC TROMETHAMINE 30 MG: 30 INJECTION, SOLUTION INTRAMUSCULAR at 20:56

## 2022-11-01 RX ADMIN — LORAZEPAM 0.5 MG: 0.5 TABLET ORAL at 18:15

## 2022-11-01 RX ADMIN — ACETAMINOPHEN 1000 MG: 500 TABLET, FILM COATED ORAL at 17:33

## 2022-11-01 RX ADMIN — OXYCODONE HYDROCHLORIDE 10 MG: 5 TABLET ORAL at 18:15

## 2022-11-01 ASSESSMENT — ACTIVITIES OF DAILY LIVING (ADL)
ADLS_ACUITY_SCORE: 37

## 2022-11-01 ASSESSMENT — ENCOUNTER SYMPTOMS
NAUSEA: 1
SHORTNESS OF BREATH: 0
SEIZURES: 0
HEADACHES: 1
CONFUSION: 0
FEVER: 0
NECK PAIN: 1
BACK PAIN: 1
COUGH: 0
VOMITING: 1
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
## 2022-11-01 NOTE — LETTER
November 1, 2022      To Whom It May Concern:      Madelyn Quintana was seen in our Emergency Department today, 11/01/22, for an injury.    She needs to be off work until 11/8/22.    If she feels significantly better before then, she can return to work earlier.    Thank you for your understanding in this matter.    Sincerely,        Keith Oropeza MD
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
## 2022-11-02 ENCOUNTER — TELEPHONE (OUTPATIENT)
Dept: OTOLARYNGOLOGY | Facility: CLINIC | Age: 27
End: 2022-11-02

## 2022-11-02 NOTE — TELEPHONE ENCOUNTER
Spoke with pt and got her set up with an appointment at the [facility].    [facility]      Marleni Shea RN  [facility]  ENT  [address] [address]  [email or web address]   Office:[phone]  Employed by [employer]

## 2022-11-02 NOTE — TELEPHONE ENCOUNTER
[facility] Call [facility]    Phone Message    May a detailed message be left on voicemail: yes     Reason for Call: Symptoms or Concerns     If patient has red-flag symptoms, warm transfer to triage line    Current symptom or concern: Patient is being referred for a nasal bone fracture    She would like to be seen in [place], but is open to all of the below clinics.      [place], [facility] [place]    Sending encounter per our protocols for scheduling      Action Taken: Other: ENT    Travel Screening: Not Applicable
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
## 2022-11-05 ENCOUNTER — TELEPHONE (OUTPATIENT)
Dept: FAMILY MEDICINE | Facility: CLINIC | Age: 27
End: 2022-11-05

## 2022-11-05 DIAGNOSIS — S02.2XXS CLOSED FRACTURE OF NASAL BONE, SEQUELA: Primary | ICD-10-CM

## 2022-11-05 NOTE — TELEPHONE ENCOUNTER
Percocet 5/325        Last written prescription date:         Last fill quantity: 12, # refills:         Last office visit:         Future office visit:         Is this a controlled substance?       Routing refill request to provider for review/approval because:     Thank You!  Jammie Da Silva  Certified Pharmacy Technician  [facility]  P: [phone] F:[phone]

## 2022-11-06 ENCOUNTER — MYC MEDICAL ADVICE (OUTPATIENT)
Dept: FAMILY MEDICINE | Facility: CLINIC | Age: 27
End: 2022-11-06

## 2022-11-07 RX ORDER — OXYCODONE AND ACETAMINOPHEN 5; 325 MG/1; MG/1
1 TABLET ORAL EVERY 6 HOURS PRN
Qty: 6 TABLET | Refills: 0 | Status: SHIPPED | OUTPATIENT
Start: 2022-11-07 | End: 2023-02-02
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
## 2022-11-08 ENCOUNTER — VIRTUAL VISIT (OUTPATIENT)
Dept: FAMILY MEDICINE | Facility: CLINIC | Age: 27
End: 2022-11-08
Payer: COMMERCIAL

## 2022-11-08 DIAGNOSIS — R07.81 RIB PAIN ON LEFT SIDE: Primary | ICD-10-CM
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
## 2022-11-08 PROCEDURE — 99213 OFFICE O/P EST LOW 20 MIN: CPT | Mod: TEL | Performed by: NURSE PRACTITIONER

## 2022-11-08 ASSESSMENT — PAIN SCALES - GENERAL: PAINLEVEL: SEVERE PAIN (7)

## 2022-11-08 ASSESSMENT — PATIENT HEALTH QUESTIONNAIRE - PHQ9: SUM OF ALL RESPONSES TO PHQ QUESTIONS 1-9: 11

## 2022-11-08 ASSESSMENT — ASTHMA QUESTIONNAIRES: ACT_TOTALSCORE: 18
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
## 2022-11-10 ENCOUNTER — OFFICE VISIT (OUTPATIENT)
Dept: OTOLARYNGOLOGY | Facility: CLINIC | Age: 27
End: 2022-11-10
Payer: COMMERCIAL

## 2022-11-10 DIAGNOSIS — S02.2XXA CLOSED FRACTURE OF NASAL BONE, INITIAL ENCOUNTER: Primary | ICD-10-CM

## 2022-11-10 PROCEDURE — 99203 OFFICE O/P NEW LOW 30 MIN: CPT | Performed by: OTOLARYNGOLOGY
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
## 2022-11-14 ENCOUNTER — TELEPHONE (OUTPATIENT)
Dept: FAMILY MEDICINE | Facility: CLINIC | Age: 27
End: 2022-11-14

## 2022-11-14 ENCOUNTER — ANCILLARY PROCEDURE (OUTPATIENT)
Dept: GENERAL RADIOLOGY | Facility: CLINIC | Age: 27
End: 2022-11-14
Attending: NURSE PRACTITIONER
Payer: COMMERCIAL

## 2022-11-14 DIAGNOSIS — R07.81 RIB PAIN ON LEFT SIDE: ICD-10-CM
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
## 2022-11-14 PROCEDURE — 71101 X-RAY EXAM UNILAT RIBS/CHEST: CPT | Mod: TC | Performed by: RADIOLOGY

## 2022-11-16 NOTE — TELEPHONE ENCOUNTER
Forms signed and completed by Honey. Called and spoke with pt. Originals at front to be picked up by pt per her request. Copies made, sent to scanning.    Chelsea Irizarry Patient

## 2022-11-30 ENCOUNTER — OFFICE VISIT (OUTPATIENT)
Dept: OBGYN | Facility: CLINIC | Age: 27
End: 2022-11-30
Payer: COMMERCIAL

## 2022-11-30 VITALS
RESPIRATION RATE: 18 BRPM | DIASTOLIC BLOOD PRESSURE: 69 MMHG | SYSTOLIC BLOOD PRESSURE: 109 MMHG | HEART RATE: 75 BPM | WEIGHT: 167 LBS | TEMPERATURE: 97 F | HEIGHT: 62 IN | BODY MASS INDEX: 30.73 KG/M2

## 2022-11-30 DIAGNOSIS — N94.6 DYSMENORRHEA: ICD-10-CM

## 2022-11-30 DIAGNOSIS — N93.9 ABNORMAL UTERINE BLEEDING: Primary | ICD-10-CM

## 2022-11-30 LAB
ESTRADIOL SERPL-MCNC: 46 PG/ML
FSH SERPL IRP2-ACNC: 6.3 MIU/ML
LH SERPL-ACNC: 11.9 MIU/ML
PROLACTIN SERPL 3RD IS-MCNC: 5 NG/ML (ref 5–23)
TSH SERPL DL<=0.005 MIU/L-ACNC: 0.72 UIU/ML (ref 0.3–4.2)

## 2022-11-30 PROCEDURE — 36415 COLL VENOUS BLD VENIPUNCTURE: CPT | Performed by: OBSTETRICS & GYNECOLOGY

## 2022-11-30 PROCEDURE — 83002 ASSAY OF GONADOTROPIN (LH): CPT | Performed by: OBSTETRICS & GYNECOLOGY

## 2022-11-30 PROCEDURE — 82670 ASSAY OF TOTAL ESTRADIOL: CPT | Performed by: OBSTETRICS & GYNECOLOGY

## 2022-11-30 PROCEDURE — 83001 ASSAY OF GONADOTROPIN (FSH): CPT | Performed by: OBSTETRICS & GYNECOLOGY

## 2022-11-30 PROCEDURE — 84443 ASSAY THYROID STIM HORMONE: CPT | Performed by: OBSTETRICS & GYNECOLOGY

## 2022-11-30 PROCEDURE — 99204 OFFICE O/P NEW MOD 45 MIN: CPT | Performed by: OBSTETRICS & GYNECOLOGY

## 2022-11-30 PROCEDURE — 84146 ASSAY OF PROLACTIN: CPT | Performed by: OBSTETRICS & GYNECOLOGY

## 2022-11-30 RX ORDER — TRANEXAMIC ACID 650 MG/1
1300 TABLET ORAL 3 TIMES DAILY PRN
Qty: 40 TABLET | Refills: 3 | Status: SHIPPED | OUTPATIENT
Start: 2022-11-30 | End: 2023-02-02

## 2022-11-30 NOTE — NURSING NOTE
"Initial /69 (BP Location: Right arm, Patient Position: Chair, Cuff Size: Adult Regular)   Pulse 75   Temp 97  F (36.1  C) (Tympanic)   Resp 18   Ht 1.575 m (5' 2\")   Wt 75.8 kg (167 lb)   LMP 11/25/2022   BMI 30.54 kg/m   Estimated body mass index is 30.54 kg/m  as calculated from the following:    Height as of this encounter: 1.575 m (5' 2\").    Weight as of this encounter: 75.8 kg (167 lb). .      "

## 2022-11-30 NOTE — PROGRESS NOTES
[facility]  OB/GYN Clinic   Gynecology Consult Note    CC:  Chief Complaint   Patient presents with     Consult     Painful periods,vomiting, not able to eat,heavy bleeding-clots        HPI: Ms. Quintana is a 27 year old  being seen for GYN consultation for abnormal uterine bleeding and dysmenorrhea.   Painful periods since menarche, getting progressively worse. Gets a period every month. One time missed a peroid, but otherwise monthly. Bleeds for 5-7 days, usually 5 days. Changes menstrual cup 2-3x a day. Feels like her bleeding is heavy. Passes blood clots baseball-sized. Cramping is very significant. Some periods are worse than others. Some days she can't get out of bed, can't eat and its debilitating. First few days are the worst, then settles down. Uses hot packs, warm baths, uses icy hot pads, takes tylenol and ibuprofen.   Minimal acne.   Hoping for pregnancy. She is tracking cycles and timing intercourse. Partner has had a vasectomy and reversal. Reports a normal SA two years ago.   No dyspareunia. No pain with bowel movements or urination.     GYN Hx: Hx of chlamydia, treated. No abnormal PAP smears. Has used OCPs, but stopped two years ago. Sexually active, is open to pregnancy. No pregnancy hx. Vaginal discharge is normal.     ROS: A 10 pt ROS was completed and found to be otherwise negative unless mentioned in the HPI.     PMH:   Past Medical History:   Diagnosis Date     Abnormal Pap smear of cervix 2021     CARDIOVASCULAR SCREENING; LDL GOAL LESS THAN 160 2014   anxiety and asthma    PSHx:   History reviewed. No pertinent surgical history.    OBHx:   OB History    Para Term  AB Living   0 0 0 0 0 0   SAB IAB Ectopic Multiple Live Births   0 0 0 0 0       Medications:   albuterol (VENTOLIN HFA) 108 (90 Base) MCG/ACT inhaler, INHALE 2 PUFFS INTO THE LUNGS EVERY 6 HOURS AS NEEDED FOR SHORTNESS OF BREATH, DIFFICULTY BREATHING OR WHEEZING.  fluocinonide  (LIDEX) 0.05 % external cream, Apply sparingly to affected area twice daily as needed.  Do not apply to face.  fluticasone (ARNUITY ELLIPTA) 50 MCG/ACT inhaler, Inhale 1 puff into the lungs daily  LORazepam (ATIVAN) 0.5 MG tablet, Take 0.5-1 tablets (0.25-0.5 mg) by mouth every 6 hours as needed for anxiety  prazosin (MINIPRESS) 1 MG capsule, 1-2 cap tid as needed for panic attack or insomnia (30min before bed).  sertraline (ZOLOFT) 100 MG tablet, Take 100 mg by mouth daily  traZODone (DESYREL) 100 MG tablet, Take 100 mg by mouth At Bedtime  hydrOXYzine (ATARAX) 25 MG tablet, Take 1-2 tablets (25-50 mg) by mouth 3 times daily as needed for anxiety (and at bedtime) (Patient not taking: Reported on 11/30/2022)  hydrOXYzine (VISTARIL) 50 MG capsule, Take 1-2 capsules ( mg) at bedtime as needed for sleep. (Patient not taking: Reported on 11/30/2022)  norgestimate-ethinyl estradiol (ORTHO-CYCLEN) 0.25-35 MG-MCG tablet, Take 1 tablet by mouth daily (Patient not taking: Reported on 11/30/2022)  oxyCODONE-acetaminophen (PERCOCET) 5-325 MG tablet, Take 1 tablet by mouth every 6 hours as needed for pain NO FURTHER PRESCRIPTIONS (Patient not taking: Reported on 11/30/2022)    No current facility-administered medications on file prior to visit.      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   Social History     Socioeconomic History     Marital status: Single     Spouse name: Not on file     Number of children: 0     Years of education: Not on file     Highest education level: Not on file   Occupational History     Occupation: [employer]     Employer: [employer]   Tobacco Use     Smoking status: Former     Packs/day: 0.50     Types: Cigarettes     Smokeless tobacco: Never      "Tobacco comments:     5 cigarettes a day    Vaping Use     Vaping Use: Every day     Substances: Nicotine, Flavoring     Devices: Disposable   Substance and Sexual Activity     Alcohol use: Yes     Comment: during past week.     Drug use: No     Sexual activity: Yes     Partners: Male   Other Topics Concern     Parent/sibling w/ CABG, MI or angioplasty before 65F 55M? Not Asked   Social History Narrative     Not on file     Social Determinants of Health     Financial Resource Strain: Not on file   Food Insecurity: Not on file   Transportation Needs: Not on file   Physical Activity: Not on file   Stress: Not on file   Social Connections: Not on file   Intimate Partner Violence: Not on file   Housing Stability: Not on file     Social History     Socioeconomic History     Marital status: Single     Spouse name: None     Number of children: 0     Years of education: None     Highest education level: None   Occupational History     Occupation: [employer]     Employer: [employer]   Tobacco Use     Smoking status: Former     Packs/day: 0.50     Types: Cigarettes     Smokeless tobacco: Never     Tobacco comments:     5 cigarettes a day    Vaping Use     Vaping Use: Every day     Substances: Nicotine, Flavoring     Devices: Disposable   Substance and Sexual Activity     Alcohol use: Yes     Comment: during past week.     Drug use: No     Sexual activity: Yes     Partners: Male       Family History:   Mom, sister PCOS  No endometriosis, no GYN malignancies    Physical Exam:   Vitals:    11/30/22 1058   BP: 109/69   BP Location: Right arm   Patient Position: Chair   Cuff Size: Adult Regular   Pulse: 75   Resp: 18   Temp: 97  F (36.1  C)   TempSrc: Tympanic   Weight: 75.8 kg (167 lb)   Height: 1.575 m (5' 2\")      Estimated body mass index is 30.54 kg/m  as calculated from the following:    Height as of this encounter: 1.575 m (5' 2\").    Weight as of this encounter: 75.8 kg (167 lb).    Gen: Pleasant, " talkative female in no apparent distress   Respiratory: breathing comfortably on room air   Cardiac: Regular rate, warm and well-perfused.   GI: Abd soft and non-tender  : External genitalia is free of lesion. Urethra and bartholin glands normal.  Vaginal mucosa is moist and pink without unusual discharge.  Cervix is without lesions or discharge.  Bimanual exam reveals mobile retroverted uterus without cervical motion tenderness.  Adenexa are mobile and non-tender bilaterally. No appreciable adnexal enlargement. No tenderness with bimanunal exam.   Rectal: no masses or hemorrhoids visually appreciated  Derm: mild acne   MSK: Grossly normal movement of all four extremities  Psych: mood and affect bright     A&P: Ms. Quintana is a 26 yo G0 who presents today to discuss abnormal uterine bleeding and dysmenorrhea.   I did recommend further evaluation for a structural cause of her bleeding with a pelvic US as well as an endocrinopathy evaluation with TSH, reflex T4, prolactin and FSH, LH and estradiol.   As far as her dysmenorrhea, I discussed that her clinical picture is certainly consistent with endometriosis. Discussed definitive diagnosis with diagnostic laparoscopy with pathologic diagnosis. Discussed my typical practice of empiric treatment prior to laparoscopy.   Discussed medical treatment options including depo provera, OCPs, hormonal patch and hormonal ring including side effect profile and bleeding patterns. I discussed LARC options of Mirena IUD and the Nexplanon. Discussed placement, expected bleeding profiles, side effect profile, efficacy as contraception and reversibility. Pt is currently hoping for pregnancy, so does not want anything that would preclude thi. I discussed that if her pain fails to improve with these interventions, I would recommended proceeding with diagnostic laparoscopy and excision of any endometriotic implants. I did discuss third line treatment with depo lupron and newer GRNH  antagonists. I also discussed the role that NSAIDs can play in pain management and reduction of vaginal blood loss. Addditionally, I reviewed the option of tranexamic acid and the mechanism of action on the bleeding pathway. I reviewed an approximate 30-50% reduction in blood loss. Reviewed how this could help with AUB and dysmenorrhea. Wants to try this. Also discussed option of expedited pregnancy and infertility work-up with they fail to become pregnant. Recommended starting PNVs.     I spent 30 min with the patient and 15 min in documentation.     Karina Bronson MD  OB/GYN  11/30/2022

## 2022-12-07 ENCOUNTER — E-VISIT (OUTPATIENT)
Dept: URGENT CARE | Facility: CLINIC | Age: 27
End: 2022-12-07
Payer: COMMERCIAL

## 2022-12-07 ENCOUNTER — OFFICE VISIT (OUTPATIENT)
Dept: URGENT CARE | Facility: URGENT CARE | Age: 27
End: 2022-12-07
Payer: COMMERCIAL

## 2022-12-07 VITALS
HEART RATE: 78 BPM | OXYGEN SATURATION: 97 % | DIASTOLIC BLOOD PRESSURE: 70 MMHG | TEMPERATURE: 97.5 F | WEIGHT: 170 LBS | BODY MASS INDEX: 31.09 KG/M2 | SYSTOLIC BLOOD PRESSURE: 109 MMHG

## 2022-12-07 DIAGNOSIS — R05.9 COUGH, UNSPECIFIED TYPE: Primary | ICD-10-CM

## 2022-12-07 DIAGNOSIS — R07.0 THROAT PAIN: Primary | ICD-10-CM

## 2022-12-07 LAB
DEPRECATED S PYO AG THROAT QL EIA: NEGATIVE
FLUAV AG SPEC QL IA: NEGATIVE
FLUBV AG SPEC QL IA: POSITIVE
GROUP A STREP BY PCR: NOT DETECTED

## 2022-12-07 PROCEDURE — 99207 PR NON-BILLABLE SERV PER CHARTING: CPT | Performed by: FAMILY MEDICINE

## 2022-12-07 PROCEDURE — 99213 OFFICE O/P EST LOW 20 MIN: CPT | Performed by: EMERGENCY MEDICINE

## 2022-12-07 PROCEDURE — 87651 STREP A DNA AMP PROBE: CPT | Performed by: EMERGENCY MEDICINE

## 2022-12-07 PROCEDURE — 87804 INFLUENZA ASSAY W/OPTIC: CPT | Performed by: EMERGENCY MEDICINE

## 2022-12-07 NOTE — PROGRESS NOTES
CHIEF COMPLAINT: Diarrhea, URI symptoms      HPI: Patient is a 27-year-old who developed diarrhea 4 days ago.  It is continued until today.  No suspicious foods.  No chronic GI illness.  Over the last 2 days she now has rhinorrhea, sinus congestion sore throat and cough.  No home COVID testing.  T-max of 101.      ROS: See HPI otherwise normal.    Allergies   Allergen Reactions     Lactose Diarrhea     Intolerance  Other reaction(s): GI intolerance  Intolerance  Intolerance      Current Outpatient Medications   Medication Sig Dispense Refill     albuterol (VENTOLIN HFA) 108 (90 Base) MCG/ACT inhaler INHALE 2 PUFFS INTO THE LUNGS EVERY 6 HOURS AS NEEDED FOR SHORTNESS OF BREATH, DIFFICULTY BREATHING OR WHEEZING. 18 g 1     fluocinonide (LIDEX) 0.05 % external cream Apply sparingly to affected area twice daily as needed.  Do not apply to face. 30 g 1     fluticasone (ARNUITY ELLIPTA) 50 MCG/ACT inhaler Inhale 1 puff into the lungs daily 1 each 0     LORazepam (ATIVAN) 0.5 MG tablet Take 0.5-1 tablets (0.25-0.5 mg) by mouth every 6 hours as needed for anxiety 10 tablet 0     prazosin (MINIPRESS) 1 MG capsule 1-2 cap tid as needed for panic attack or insomnia (30min before bed).       sertraline (ZOLOFT) 100 MG tablet Take 100 mg by mouth daily       tranexamic acid (LYSTEDA) 650 MG tablet Take 2 tablets (1,300 mg) by mouth 3 times daily as needed (vaginal bleeding) 40 tablet 3     traZODone (DESYREL) 100 MG tablet Take 100 mg by mouth At Bedtime       hydrOXYzine (ATARAX) 25 MG tablet Take 1-2 tablets (25-50 mg) by mouth 3 times daily as needed for anxiety (and at bedtime) (Patient not taking: Reported on 11/30/2022) 30 tablet 1     hydrOXYzine (VISTARIL) 50 MG capsule Take 1-2 capsules ( mg) at bedtime as needed for sleep. (Patient not taking: Reported on 11/30/2022) 60 capsule 3     norgestimate-ethinyl estradiol (ORTHO-CYCLEN) 0.25-35 MG-MCG tablet Take 1 tablet by mouth daily (Patient not taking: Reported on  11/30/2022)       oxyCODONE-acetaminophen (PERCOCET) 5-325 MG tablet Take 1 tablet by mouth every 6 hours as needed for pain NO FURTHER PRESCRIPTIONS (Patient not taking: Reported on 12/7/2022) 6 tablet 0         PE: Physical exam reveals a 27-year-old female to be in no acute distress.  She is alert and oriented.  Vital signs reveal temp 97.5 and remainder vital signs also normal.  HEENT reveals moist oral mucous membranes.  Posterior pharynx is erythematous.  No exudate or abscess.  Ears show no signs of infection.  Neck does reveal slightly tender anterior cervical nodes.  Lungs are clear throughout heart is regular skin warm and dry.        TREATMENT: Rapid strep: Negative.  Influenza: Positive for B.      ASSESSMENT: Viral syndrome by nature of symptoms with no complicating concerns.  Diarrhea may be part of viral illness but needs monitoring.    DIAGNOSIS: Influenza B.  Diarrhea.    PLAN: Symptomatic instructions discussed.  Recheck 1 week if still ill, sooner if worse

## 2022-12-07 NOTE — PATIENT INSTRUCTIONS
Dear Madelyn Quintana,    We are sorry you are not feeling well. Based on the responses you provided, it is recommended that you be seen in-person in urgent care so we can better evaluate your symptoms. Please click here to find the nearest urgent care location to you.   You will not be charged for this Visit. Thank you for trusting us with your care.    Carlita Vásquez MD

## 2022-12-07 NOTE — PATIENT INSTRUCTIONS
DayQuil and NyQuil type medicines as discussed  Plenty of fluids  Ice chips, lozenges, crushed popsicles for sore throat  Recheck 1 week if still ill, sooner if worse  Read information on diarrhea.  Consider Imodium A-D.  Follow-up 1 week if diarrhea continues.

## 2022-12-08 NOTE — RESULT ENCOUNTER NOTE
Group A Streptococcus PCR is NEGATIVE  No treatment or change in treatment [facility] ED lab result Strep Group A protocol.

## 2022-12-11 ENCOUNTER — ANCILLARY PROCEDURE (OUTPATIENT)
Dept: GENERAL RADIOLOGY | Facility: CLINIC | Age: 27
End: 2022-12-11
Attending: FAMILY MEDICINE
Payer: COMMERCIAL

## 2022-12-11 ENCOUNTER — OFFICE VISIT (OUTPATIENT)
Dept: URGENT CARE | Facility: URGENT CARE | Age: 27
End: 2022-12-11
Payer: COMMERCIAL

## 2022-12-11 VITALS
HEART RATE: 101 BPM | RESPIRATION RATE: 20 BRPM | BODY MASS INDEX: 31.39 KG/M2 | TEMPERATURE: 99.8 F | DIASTOLIC BLOOD PRESSURE: 68 MMHG | OXYGEN SATURATION: 100 % | WEIGHT: 171.6 LBS | SYSTOLIC BLOOD PRESSURE: 120 MMHG

## 2022-12-11 DIAGNOSIS — R05.9 COUGH, UNSPECIFIED TYPE: ICD-10-CM

## 2022-12-11 DIAGNOSIS — J20.9 ACUTE BRONCHITIS WITH COEXISTING CONDITION REQUIRING PROPHYLACTIC TREATMENT: Primary | ICD-10-CM

## 2022-12-11 PROCEDURE — 71046 X-RAY EXAM CHEST 2 VIEWS: CPT | Mod: TC | Performed by: RADIOLOGY

## 2022-12-11 PROCEDURE — 99214 OFFICE O/P EST MOD 30 MIN: CPT | Performed by: FAMILY MEDICINE

## 2022-12-11 RX ORDER — AZITHROMYCIN 250 MG/1
TABLET, FILM COATED ORAL
Qty: 6 TABLET | Refills: 0 | Status: SHIPPED | OUTPATIENT
Start: 2022-12-11 | End: 2022-12-16

## 2022-12-11 RX ORDER — PREDNISONE 20 MG/1
40 TABLET ORAL DAILY
Qty: 10 TABLET | Refills: 0 | Status: SHIPPED | OUTPATIENT
Start: 2022-12-11 | End: 2022-12-16

## 2022-12-11 NOTE — PROGRESS NOTES
Assessment & Plan     Acute bronchitis with coexisting condition requiring prophylactic treatment  Differentials discussed in detail.  Suspect symptoms secondary to acute bronchitis with asthma exacerbation, cannot rule out pulmonary embolism completely.  Patient diagnosed with influenza infection 4 days ago.  Offered ER transfer which patient declined.  Chest x-ray findings reviewed independently which showed no infiltrate or other acute abnormality.  Azithromycin, prednisone prescribed, common side effect discussed.  Recommended to continue albuterol and Arnuity inhaler.  Suggested well hydration, warm fluids and to go ER if symptoms persist or worsen otherwise follow-up with PCP in 3 to 5 days.  Patient understood and in agreement with above plan.  All questions answered.  - azithromycin (ZITHROMAX) 250 MG tablet; Take 2 tablets (500 mg) by mouth daily for 1 day, THEN 1 tablet (250 mg) daily for 4 days.  - predniSONE (DELTASONE) 20 MG tablet; Take 2 tablets (40 mg) by mouth daily for 5 days    Cough, unspecified type  - XR Chest 2 Views; Future      Toni Dorsey MD  [facility]    Kyra Joshi is a 27 year old, presenting for the following health issues:  Cough (Cough more, cant really take a deep breathe, thought she was getting better, but woke up today feeling a lot worse.) and Flu (Flu positive on 12/7)      HPI     Concern -   Onset: 6 days   Description: sob and wheezing started today, ongoing cough, congestion and headache   Intensity: moderate  Progression of Symptoms:  worsening  Accompanying Signs & Symptoms: no fever  Previous history of similar problem: Known to have asthma  Therapies tried and outcome: dayquil, nyquil, mucinex, tylenol       Review of Systems   Constitutional, HEENT, cardiovascular, pulmonary, gi and gu systems are negative, except as otherwise noted.      Objective    /68   Pulse 101   Temp 99.8  F (37.7  C) (Tympanic)   Resp 20    Wt 77.8 kg (171 lb 9.6 oz)   LMP 11/25/2022   SpO2 100%   BMI 31.39 kg/m    Body mass index is 31.39 kg/m .  Physical Exam   GENERAL: alert and no distress  EYES: Eyes grossly normal to inspection, PERRL and conjunctivae and sclerae normal  HENT: ear canals and TM's normal, nose and mouth without ulcers or lesions  NECK: no adenopathy, no asymmetry, masses, or scars and thyroid normal to palpation  RESP: Bibasilar expiratory wheeze, no rhonchi or rales auscultated  CV: tachycardia, normal S1 S2, no S3 or S4 and no murmur, click or rub  MS: no gross musculoskeletal defects noted, no edema, Homans' sign negative  SKIN: no suspicious lesions or rashes  NEURO: Normal strength and tone, mentation intact and speech normal  PSYCH: mentation appears normal, affect normal/bright

## 2022-12-16 ENCOUNTER — HOSPITAL ENCOUNTER (OUTPATIENT)
Dept: ULTRASOUND IMAGING | Facility: CLINIC | Age: 27
Discharge: HOME | End: 2022-12-16
Attending: OBSTETRICS & GYNECOLOGY | Admitting: OBSTETRICS & GYNECOLOGY
Payer: COMMERCIAL

## 2022-12-16 DIAGNOSIS — N94.6 DYSMENORRHEA: ICD-10-CM

## 2022-12-16 DIAGNOSIS — N93.9 ABNORMAL UTERINE BLEEDING: ICD-10-CM

## 2022-12-16 PROCEDURE — 76830 TRANSVAGINAL US NON-OB: CPT

## 2023-01-08 ENCOUNTER — HEALTH MAINTENANCE LETTER (OUTPATIENT)
Age: 28
End: 2023-01-08

## 2023-02-02 ENCOUNTER — VIRTUAL VISIT (OUTPATIENT)
Dept: OBGYN | Facility: CLINIC | Age: 28
End: 2023-02-02
Payer: COMMERCIAL

## 2023-02-02 DIAGNOSIS — N97.9 PRIMARY FEMALE INFERTILITY: ICD-10-CM

## 2023-02-02 DIAGNOSIS — N93.9 ABNORMAL UTERINE BLEEDING: Primary | ICD-10-CM

## 2023-02-02 PROCEDURE — 99213 OFFICE O/P EST LOW 20 MIN: CPT | Mod: 93 | Performed by: OBSTETRICS & GYNECOLOGY

## 2023-02-02 RX ORDER — TRANEXAMIC ACID 650 MG/1
1300 TABLET ORAL 3 TIMES DAILY
Qty: 30 TABLET | Refills: 12 | Status: SHIPPED | OUTPATIENT
Start: 2023-02-02 | End: 2023-08-07

## 2023-02-02 NOTE — PROGRESS NOTES
Madelyn is a 27 year old who is being evaluated via a billable telephone visit.      What phone number would you like to be contacted at? [phone]    How would you like to obtain your AVS? MyChart    Distant Location (provider location): [facility]    She is located is [facility] during this phone visit.     Never took the lysteda, pharmacy said it was withdrawn.     Have been trying for pregnancy for two years. Tracks cycles and ovulation. Usually gets a +OPK on cycle day #11.     Subjective   Madelyn is a 27 year old, presenting for the following health issues:  Fertility      HPI     Objective           Vitals:  No vitals were obtained today due to virtual visit.    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 Discussed option of diagnostic laparoscopy, excision of endo for symptomatic relief as well as improved fertility.   Plan to complete repeat SA - orders and instructions placed.   Needs HSG for fertility eval and cycle day #21 progesterone level.   Assuming normal eval, plan clomid OI/trigger injection and IUI.       Will f/u on MyChart once eval is complete.     Phone call duration:18 minutes    Karina Bronson MD  OB/GYN

## 2023-02-27 ENCOUNTER — OFFICE VISIT (OUTPATIENT)
Dept: OBGYN | Facility: CLINIC | Age: 28
End: 2023-02-27
Payer: COMMERCIAL

## 2023-02-27 ENCOUNTER — TELEPHONE (OUTPATIENT)
Dept: OBGYN | Facility: CLINIC | Age: 28
End: 2023-02-27

## 2023-02-27 ENCOUNTER — HOSPITAL ENCOUNTER (OUTPATIENT)
Dept: GENERAL RADIOLOGY | Facility: CLINIC | Age: 28
Discharge: HOME | End: 2023-02-27
Attending: OBSTETRICS & GYNECOLOGY | Admitting: OBSTETRICS & GYNECOLOGY
Payer: COMMERCIAL

## 2023-02-27 DIAGNOSIS — N97.9 PRIMARY FEMALE INFERTILITY: ICD-10-CM

## 2023-02-27 DIAGNOSIS — N97.9 FEMALE INFERTILITY: Primary | ICD-10-CM

## 2023-02-27 PROCEDURE — 250N000011 HC RX IP 250 OP 636: Performed by: OBSTETRICS & GYNECOLOGY

## 2023-02-27 PROCEDURE — 74740 X-RAY FEMALE GENITAL TRACT: CPT

## 2023-02-27 PROCEDURE — 58340 CATHETER FOR HYSTEROGRAPHY: CPT | Performed by: OBSTETRICS & GYNECOLOGY

## 2023-02-27 PROCEDURE — 58340 CATHETER FOR HYSTEROGRAPHY: CPT

## 2023-02-27 RX ORDER — IOPAMIDOL 612 MG/ML
15 INJECTION, SOLUTION INTRATHECAL ONCE
Status: COMPLETED | OUTPATIENT
Start: 2023-02-27 | End: 2023-02-27

## 2023-02-27 RX ADMIN — IOPAMIDOL 15 ML: 612 INJECTION, SOLUTION INTRATHECAL at 11:44

## 2023-02-27 NOTE — PROGRESS NOTES
[facility]  OB/GYN      Name: Madelyn Quintana   : 1995   MRN:[id number]      Procedure: Hysterosalpingogram     Indication: Infertility evaluation      Procedure: I reviewed the risks of the procedure including bleeding, cramping/pain and infection. Written informed consent was signed. The patient was placed in dorsal lithotomy and a speculum was used to visualize the cervix. The cervix was cleaned with betadine swabs x3. The HSG catheter was passed through the cervix and the catheter balloon was inflated within the uterus. Dye was injected through the catheter while real-time x-rays were obtained by the Radiologist. Please see their documentation for interpretation of these images. Once adequate images were obtained, all instruments and catheter were removed. The patient tolerated the procedure well and EBL 0cc.     Wendy Wallace MD   2023 1:03 PM

## 2023-02-27 NOTE — TELEPHONE ENCOUNTER
Pt is calling stating that she is needing to get some labs done of day 21 of her cycle and unfortunately that's on a sunday she is wondering if she can come in on that Monday or what is she needing to do since the lab isn't open on weekends. Please advise.

## 2023-03-02 ENCOUNTER — TELEPHONE (OUTPATIENT)
Dept: OBGYN | Facility: CLINIC | Age: 28
End: 2023-03-02
Payer: COMMERCIAL

## 2023-03-02 NOTE — TELEPHONE ENCOUNTER
Pt called to schedule IUI procedure with Audie. Told her someone would reach out to her sometime today.

## 2023-03-03 ENCOUNTER — VIRTUAL VISIT (OUTPATIENT)
Dept: OBGYN | Facility: CLINIC | Age: 28
End: 2023-03-03
Payer: COMMERCIAL

## 2023-03-03 VITALS — HEIGHT: 62 IN | WEIGHT: 170 LBS | BODY MASS INDEX: 31.28 KG/M2

## 2023-03-03 DIAGNOSIS — N97.9 FEMALE INFERTILITY: Primary | ICD-10-CM

## 2023-03-03 PROCEDURE — 99212 OFFICE O/P EST SF 10 MIN: CPT | Mod: 93 | Performed by: OBSTETRICS & GYNECOLOGY

## 2023-03-03 RX ORDER — CLOMIPHENE CITRATE 50 MG/1
50 TABLET ORAL DAILY
Qty: 5 TABLET | Refills: 12 | Status: SHIPPED | OUTPATIENT
Start: 2023-03-03 | End: 2023-03-15 | Stop reason: DRUGHIGH

## 2023-03-03 RX ORDER — PRAZOSIN HYDROCHLORIDE 1 MG/1
1 CAPSULE ORAL AT BEDTIME
COMMUNITY
End: 2023-08-07

## 2023-03-03 NOTE — PROGRESS NOTES
Madelyn is a 27 year old who is being evaluated via a billable telephone visit.      What phone number would you like to be contacted at? [phone]  How would you like to obtain your AVS? MyChart    Distant Location (provider location):  On-site    Subjective   Madelyn is a 27 year old presenting for the following health issues:  Fertility    F/U fertility plan. Normal labs, HSG. SA with low normal forms.   HPI         Objective           Vitals:  No vitals were obtained today due to virtual visit.    Physical Exam   PSYCH: Alert and oriented times 3; coherent speech, normal   rate and volume, able to articulate logical thoughts, able   to abstract reason, no tangential thoughts, no hallucinations   or delusions  Her affect is normal  RESP: No cough, no audible wheezing, able to talk in full sentences  Remainder of exam unable to be completed due to telephone visits  A/P: 28 yo P0, primary infertility  Unexplained vs male factor.   Recommended PNVs.   Plan clomid OI/follicular US/trigger injection and IUI. Will MyChart with next cycle.         Phone call duration: 8 minutes  The patient was at her home when I completed this telephone visit and I was located at my office at [facility].     Karina Bronson MD  OB/GYN

## 2023-03-14 ENCOUNTER — LAB (OUTPATIENT)
Dept: LAB | Facility: CLINIC | Age: 28
End: 2023-03-14
Payer: COMMERCIAL

## 2023-03-14 DIAGNOSIS — N97.9 PRIMARY FEMALE INFERTILITY: ICD-10-CM

## 2023-03-14 DIAGNOSIS — Z11.59 NEED FOR HEPATITIS C SCREENING TEST: ICD-10-CM

## 2023-03-14 PROCEDURE — 86803 HEPATITIS C AB TEST: CPT

## 2023-03-14 PROCEDURE — 36415 COLL VENOUS BLD VENIPUNCTURE: CPT

## 2023-03-14 PROCEDURE — 84144 ASSAY OF PROGESTERONE: CPT

## 2023-03-15 DIAGNOSIS — N97.9 FEMALE INFERTILITY: Primary | ICD-10-CM

## 2023-03-15 LAB
HCV AB SERPL QL IA: NONREACTIVE
PROGEST SERPL-MCNC: 5.3 NG/ML

## 2023-03-15 RX ORDER — PROGESTERONE 200 MG/1
200 CAPSULE ORAL DAILY
Qty: 30 CAPSULE | Refills: 3 | Status: SHIPPED | OUTPATIENT
Start: 2023-03-15 | End: 2024-06-13

## 2023-03-15 RX ORDER — CLOMIPHENE CITRATE 50 MG/1
100 TABLET ORAL DAILY
Qty: 10 TABLET | Refills: 12 | Status: SHIPPED | OUTPATIENT
Start: 2023-03-15 | End: 2023-07-26 | Stop reason: ALTCHOICE

## 2023-04-03 ENCOUNTER — OFFICE VISIT (OUTPATIENT)
Dept: OBGYN | Facility: CLINIC | Age: 28
End: 2023-04-03
Payer: COMMERCIAL

## 2023-04-03 VITALS
DIASTOLIC BLOOD PRESSURE: 65 MMHG | SYSTOLIC BLOOD PRESSURE: 120 MMHG | RESPIRATION RATE: 16 BRPM | TEMPERATURE: 97.6 F | HEART RATE: 73 BPM | WEIGHT: 171.7 LBS | BODY MASS INDEX: 31.6 KG/M2 | HEIGHT: 62 IN

## 2023-04-03 DIAGNOSIS — N97.9 FEMALE INFERTILITY: Primary | ICD-10-CM

## 2023-04-03 DIAGNOSIS — N97.9 PRIMARY FEMALE INFERTILITY: ICD-10-CM

## 2023-04-03 PROCEDURE — 76830 TRANSVAGINAL US NON-OB: CPT | Performed by: OBSTETRICS & GYNECOLOGY

## 2023-04-03 PROCEDURE — 99212 OFFICE O/P EST SF 10 MIN: CPT | Mod: 25 | Performed by: OBSTETRICS & GYNECOLOGY

## 2023-04-03 RX ORDER — PRENATAL VIT/IRON FUM/FOLIC AC 27MG-0.8MG
1 TABLET ORAL DAILY
COMMUNITY
End: 2024-09-13

## 2023-04-03 RX ORDER — CHORIONIC GONADOTROPIN 10000 UNIT
10000 KIT INTRAMUSCULAR ONCE
Status: COMPLETED | OUTPATIENT
Start: 2023-04-03 | End: 2023-04-06

## 2023-04-03 NOTE — PROGRESS NOTES
Follicular US:  100mg clomid  CD#10  C#1 with clomid OI/folliuclar US/trigger and IUI    Transvaginal US;   Mid-position uterus, EMS at 5.8mm, left ovary with 14mm and 13mm dominant follicles, none in right ovary. Ovaries appear polycystic        A/P: 26 yo G0 with unexplained infertility  Plan clomid OI/folliuclar US/trigger and IUI  Plan trigger 4/6 and IUI 4/7.   Plan cycle day #21 progesterone level    Karina Bronson MD  OB/GYN

## 2023-04-03 NOTE — PROGRESS NOTES
Ms. Quintana is a 28 yo G0 with unexplained infertility. The standard of care is clomid for ovulation induction, follicular US and then chorionic gonadotropin injection for timing of intrauterine insemination. This medication is medically neccessary due to specific timing of the injection based on follicle size.     Karina Bronson MD  OB/GYN

## 2023-04-03 NOTE — PROGRESS NOTES
"Initial /65 (BP Location: Right arm, Patient Position: Chair, Cuff Size: Adult Regular)   Pulse 73   Temp 97.6  F (36.4  C) (Tympanic)   Resp 16   Ht 1.575 m (5' 2\")   Wt 77.9 kg (171 lb 11.2 oz)   LMP 03/24/2023   BMI 31.40 kg/m   Estimated body mass index is 31.4 kg/m  as calculated from the following:    Height as of this encounter: 1.575 m (5' 2\").    Weight as of this encounter: 77.9 kg (171 lb 11.2 oz). .      "

## 2023-04-06 ENCOUNTER — ALLIED HEALTH/NURSE VISIT (OUTPATIENT)
Dept: OBGYN | Facility: CLINIC | Age: 28
End: 2023-04-06
Payer: COMMERCIAL

## 2023-04-06 DIAGNOSIS — N97.9 FEMALE INFERTILITY: Primary | ICD-10-CM

## 2023-04-06 PROCEDURE — 99207 PR NO CHARGE NURSE ONLY: CPT

## 2023-04-06 PROCEDURE — 96372 THER/PROPH/DIAG INJ SC/IM: CPT | Performed by: OBSTETRICS & GYNECOLOGY

## 2023-04-06 RX ADMIN — CHORIONIC GONADOTROPIN 10000 UNITS: KIT at 09:55

## 2023-04-06 NOTE — PROGRESS NOTES
Clinic Administered Medication Documentation      Injectable Medication Documentation    Is there an active order (written within the past 365 days, with administrations remaining, not ) in the chart? Yes.     Patient was given Pregnyl 10,000 USP. Prior to medication administration, verified patient's identity using patient s name and date of birth. Please see MAR and medication order for additional information. Patient instructed to remain in clinic for 15 minutes and report any adverse reaction to staff immediately.    Vial/Syringe: Single dose vial. Was entire vial of medication used? Yes  Was this medication supplied by the patient? No  Is this a medication the patient will need to receive again? No - not necessary to check for refills remaining.     Susanne Alcala, CMA

## 2023-04-07 ENCOUNTER — ALLIED HEALTH/NURSE VISIT (OUTPATIENT)
Dept: OBGYN | Facility: CLINIC | Age: 28
End: 2023-04-07
Payer: COMMERCIAL

## 2023-04-07 ENCOUNTER — OFFICE VISIT (OUTPATIENT)
Dept: OBGYN | Facility: CLINIC | Age: 28
End: 2023-04-07
Payer: COMMERCIAL

## 2023-04-07 VITALS
TEMPERATURE: 96.8 F | HEIGHT: 62 IN | WEIGHT: 173.2 LBS | RESPIRATION RATE: 16 BRPM | BODY MASS INDEX: 31.87 KG/M2 | HEART RATE: 70 BPM | DIASTOLIC BLOOD PRESSURE: 62 MMHG | SYSTOLIC BLOOD PRESSURE: 125 MMHG

## 2023-04-07 DIAGNOSIS — N97.9 FEMALE INFERTILITY: Primary | ICD-10-CM

## 2023-04-07 PROCEDURE — 58322 ARTIFICIAL INSEMINATION: CPT | Performed by: STUDENT IN AN ORGANIZED HEALTH CARE EDUCATION/TRAINING PROGRAM

## 2023-04-07 PROCEDURE — 99207 PR NO CHARGE NURSE ONLY: CPT

## 2023-04-07 NOTE — PROGRESS NOTES
"[facility]  OB/GYN Clinic    CC: IUI    Infertility treatments to date: clomid 100mg    S: Patient presents with her partner, who has left a fresh semen specimen in collection cup. Pt reports 3/24/2023. Informed consent was singed after a discussion of the risks of intrauterine insemination.    O:   /62 (BP Location: Right arm, Patient Position: Sitting, Cuff Size: Adult Regular)   Pulse 70   Temp 96.8  F (36  C)   Resp 16   Ht 1.575 m (5' 2\")   Wt 78.6 kg (173 lb 3.2 oz)   LMP 03/24/2023 (Exact Date)   BMI 31.68 kg/m      Procedure: The patient was placed in dorsal lithotomy position. A speculum was placed in the vagina and the cervix visualized. A specimen catheter labeled with the patient's information was placed through the cervix and the sample was injected. The patient tolerated the procedure well. There were no complications and no blood loss. The patient remained in our clinic with elevatd pelvis for 20 min after the insemination. Plan for pregnancy test with missed menses.     Elenita Kwong MD  Obstetrics and Gynecology  [facility]   04/07/2023 10:26 AM        "

## 2023-04-07 NOTE — PATIENT INSTRUCTIONS
"On \"[email or web address]\", search \"Dr. vikas gonzalez IUI\". Here is the link:  [email or web address]    She also has a lot of good information on unexplained infertility and infertility in genera.  "

## 2023-04-13 ENCOUNTER — LAB (OUTPATIENT)
Dept: LAB | Facility: CLINIC | Age: 28
End: 2023-04-13
Payer: COMMERCIAL

## 2023-04-13 DIAGNOSIS — N97.9 PRIMARY FEMALE INFERTILITY: ICD-10-CM

## 2023-04-13 PROCEDURE — 36415 COLL VENOUS BLD VENIPUNCTURE: CPT

## 2023-04-13 PROCEDURE — 84144 ASSAY OF PROGESTERONE: CPT

## 2023-04-14 LAB — PROGEST SERPL-MCNC: 12.2 NG/ML

## 2023-04-18 ENCOUNTER — MYC REFILL (OUTPATIENT)
Dept: FAMILY MEDICINE | Facility: CLINIC | Age: 28
End: 2023-04-18
Payer: COMMERCIAL

## 2023-04-18 DIAGNOSIS — F33.0 MAJOR DEPRESSIVE DISORDER, RECURRENT EPISODE, MILD (H): Primary | ICD-10-CM

## 2023-04-19 RX ORDER — TRAZODONE HYDROCHLORIDE 100 MG/1
100 TABLET ORAL AT BEDTIME
Qty: 90 TABLET | Refills: 3 | Status: SHIPPED | OUTPATIENT
Start: 2023-04-19 | End: 2024-06-20

## 2023-04-19 RX ORDER — SERTRALINE HYDROCHLORIDE 100 MG/1
100 TABLET, FILM COATED ORAL DAILY
Qty: 90 TABLET | Refills: 3 | Status: SHIPPED | OUTPATIENT
Start: 2023-04-19 | End: 2023-05-03

## 2023-04-30 ENCOUNTER — MYC MEDICAL ADVICE (OUTPATIENT)
Dept: FAMILY MEDICINE | Facility: CLINIC | Age: 28
End: 2023-04-30
Payer: COMMERCIAL

## 2023-04-30 DIAGNOSIS — F33.0 MAJOR DEPRESSIVE DISORDER, RECURRENT EPISODE, MILD (H): ICD-10-CM

## 2023-05-01 ENCOUNTER — OFFICE VISIT (OUTPATIENT)
Dept: OBGYN | Facility: CLINIC | Age: 28
End: 2023-05-01
Payer: COMMERCIAL

## 2023-05-01 VITALS
WEIGHT: 177 LBS | HEIGHT: 63 IN | DIASTOLIC BLOOD PRESSURE: 66 MMHG | BODY MASS INDEX: 31.36 KG/M2 | RESPIRATION RATE: 16 BRPM | TEMPERATURE: 97.9 F | HEART RATE: 74 BPM | SYSTOLIC BLOOD PRESSURE: 109 MMHG

## 2023-05-01 DIAGNOSIS — N97.9 PRIMARY FEMALE INFERTILITY: Primary | ICD-10-CM

## 2023-05-01 PROCEDURE — 99212 OFFICE O/P EST SF 10 MIN: CPT | Mod: 25 | Performed by: OBSTETRICS & GYNECOLOGY

## 2023-05-01 PROCEDURE — 76830 TRANSVAGINAL US NON-OB: CPT | Performed by: OBSTETRICS & GYNECOLOGY

## 2023-05-01 RX ORDER — BUPROPION HYDROCHLORIDE 150 MG/1
1 TABLET ORAL EVERY MORNING
COMMUNITY
Start: 2021-04-23 | End: 2023-08-07

## 2023-05-01 RX ORDER — ESCITALOPRAM OXALATE 10 MG/1
1 TABLET ORAL DAILY
COMMUNITY
Start: 2021-04-23 | End: 2023-08-07

## 2023-05-01 NOTE — PROGRESS NOTES
Follicular US:  100mg clomid  CD#10  C#2 with clomid OI/folliuclar US/trigger and IUI     Transvaginal US;   Mid-position uterus, EMS at 6mm, left ovary with 13mm dominant follicles, none in right ovary. Ovaries appear polycystic              A/P: 28 yo G0 with unexplained infertility  Plan clomid OI/folliuclar US/trigger and IUI  Plan trigger 5/4 and IUI 5/5.   Plan cycle day #21 progesterone level    Discussed switching to letrazole with next cycle if she isn't pregnant due to PCOS-appearing ovaries.      Karina Bronson MD  OB/GYN

## 2023-05-01 NOTE — NURSING NOTE
"Initial /66 (BP Location: Right arm, Patient Position: Chair, Cuff Size: Adult Regular)   Pulse 74   Temp 97.9  F (36.6  C) (Tympanic)   Resp 16   Ht 1.594 m (5' 2.75\")   Wt 80.3 kg (177 lb)   LMP 03/24/2023 (Exact Date)   BMI 31.60 kg/m   Estimated body mass index is 31.6 kg/m  as calculated from the following:    Height as of this encounter: 1.594 m (5' 2.75\").    Weight as of this encounter: 80.3 kg (177 lb). .      "

## 2023-05-03 RX ORDER — SERTRALINE HYDROCHLORIDE 100 MG/1
200 TABLET, FILM COATED ORAL DAILY
Qty: 180 TABLET | Refills: 0 | Status: SHIPPED | OUTPATIENT
Start: 2023-05-03

## 2023-05-03 NOTE — TELEPHONE ENCOUNTER
Honey, I did confirm with [facility], last sertraline Rx was for 200 mg daily from Dr Kilo Narayanan. Is the information she is asking about could be seen through Care Everywhere?  Denise CAMPBELL RN        
Thank you for looking into this. Can you notify patient, I will send through new prescription however she needs to be seen soon for further refills as it's been almost 2 years since I last saw her in clinic. No, unfortunately this information is not through Care Everywhere, she'd have to sign a LORETTA in order for them to release records.     Thanks,  oHney Delgado, DNP, APRN-CNP     
Gigi Simpson MD

## 2023-05-05 ENCOUNTER — ALLIED HEALTH/NURSE VISIT (OUTPATIENT)
Dept: OBGYN | Facility: CLINIC | Age: 28
End: 2023-05-05
Payer: COMMERCIAL

## 2023-05-05 DIAGNOSIS — N97.9 PRIMARY FEMALE INFERTILITY: Primary | ICD-10-CM

## 2023-05-05 PROCEDURE — 96372 THER/PROPH/DIAG INJ SC/IM: CPT | Performed by: OBSTETRICS & GYNECOLOGY

## 2023-05-05 PROCEDURE — 99207 PR NO CHARGE NURSE ONLY: CPT

## 2023-05-05 RX ORDER — CHORIONIC GONADOTROPIN 10000 UNIT
10000 KIT INTRAMUSCULAR ONCE
Status: COMPLETED | OUTPATIENT
Start: 2023-05-05 | End: 2023-05-05

## 2023-05-05 RX ADMIN — CHORIONIC GONADOTROPIN 10000 UNITS: KIT at 10:35

## 2023-05-05 NOTE — PROGRESS NOTES
Clinic Administered Medication Documentation        Patient was given Chorionic gonadotropin 10,000 units. Prior to medication administration, verified patient's identity using patient s name and date of birth. Please see MAR and medication order for additional information. Patient instructed to remain in clinic for 15 minutes and report any adverse reaction to staff immediately.    Vial/Syringe: Single dose vial. Was entire vial of medication used? Yes     Susanne Alcala, LESLY

## 2023-05-16 ENCOUNTER — VIRTUAL VISIT (OUTPATIENT)
Dept: FAMILY MEDICINE | Facility: CLINIC | Age: 28
End: 2023-05-16
Payer: COMMERCIAL

## 2023-05-16 ENCOUNTER — ALLIED HEALTH/NURSE VISIT (OUTPATIENT)
Dept: FAMILY MEDICINE | Facility: CLINIC | Age: 28
End: 2023-05-16
Payer: COMMERCIAL

## 2023-05-16 DIAGNOSIS — J02.9 SORE THROAT: Primary | ICD-10-CM

## 2023-05-16 DIAGNOSIS — J02.0 STREPTOCOCCAL SORE THROAT: Primary | ICD-10-CM

## 2023-05-16 DIAGNOSIS — R05.1 ACUTE COUGH: ICD-10-CM

## 2023-05-16 LAB — DEPRECATED S PYO AG THROAT QL EIA: NEGATIVE

## 2023-05-16 PROCEDURE — 99207 PR NO CHARGE NURSE ONLY: CPT

## 2023-05-16 PROCEDURE — 87651 STREP A DNA AMP PROBE: CPT | Mod: VID | Performed by: NURSE PRACTITIONER

## 2023-05-16 PROCEDURE — 99213 OFFICE O/P EST LOW 20 MIN: CPT | Mod: VID | Performed by: NURSE PRACTITIONER

## 2023-05-16 RX ORDER — BENZONATATE 200 MG/1
200 CAPSULE ORAL 3 TIMES DAILY PRN
Qty: 20 CAPSULE | Refills: 0 | Status: SHIPPED | OUTPATIENT
Start: 2023-05-16 | End: 2023-11-08

## 2023-05-16 ASSESSMENT — ASTHMA QUESTIONNAIRES
QUESTION_5 LAST FOUR WEEKS HOW WOULD YOU RATE YOUR ASTHMA CONTROL: WELL CONTROLLED
QUESTION_4 LAST FOUR WEEKS HOW OFTEN HAVE YOU USED YOUR RESCUE INHALER OR NEBULIZER MEDICATION (SUCH AS ALBUTEROL): ONE OR TWO TIMES PER DAY
ACT_TOTALSCORE: 13
QUESTION_1 LAST FOUR WEEKS HOW MUCH OF THE TIME DID YOUR ASTHMA KEEP YOU FROM GETTING AS MUCH DONE AT WORK, SCHOOL OR AT HOME: SOME OF THE TIME
ACT_TOTALSCORE: 13
QUESTION_2 LAST FOUR WEEKS HOW OFTEN HAVE YOU HAD SHORTNESS OF BREATH: MORE THAN ONCE A DAY
QUESTION_3 LAST FOUR WEEKS HOW OFTEN DID YOUR ASTHMA SYMPTOMS (WHEEZING, COUGHING, SHORTNESS OF BREATH, CHEST TIGHTNESS OR PAIN) WAKE YOU UP AT NIGHT OR EARLIER THAN USUAL IN THE MORNING: ONCE A WEEK

## 2023-05-16 ASSESSMENT — PATIENT HEALTH QUESTIONNAIRE - PHQ9
SUM OF ALL RESPONSES TO PHQ QUESTIONS 1-9: 6
SUM OF ALL RESPONSES TO PHQ QUESTIONS 1-9: 6
10. IF YOU CHECKED OFF ANY PROBLEMS, HOW DIFFICULT HAVE THESE PROBLEMS MADE IT FOR YOU TO DO YOUR WORK, TAKE CARE OF THINGS AT HOME, OR GET ALONG WITH OTHER PEOPLE: SOMEWHAT DIFFICULT

## 2023-05-16 NOTE — PROGRESS NOTES
"Madelyn is a 27 year old who is being evaluated via a billable video visit.      How would you like to obtain your AVS? MyChart  If the video visit is dropped, the invitation should be resent by: Text to cell phone: [phone]  Will anyone else be joining your video visit? No  But before and I just tell like to change my timing schedule because::      Assessment & Plan     (J02.0) Streptococcal sore throat  (primary encounter diagnosis)  Comment: negative   Plan: Streptococcus A Rapid Screen w/Reflex to PCR -         Clinic Collect, Group A Streptococcus PCR         Throat Swab      (R05.1) Acute cough  Comment:   Plan: benzonatate (TESSALON) 200 MG capsule               BMI:   Estimated body mass index is 31.6 kg/m  as calculated from the following:    Height as of 5/1/23: 1.594 m (5' 2.75\").    Weight as of 5/1/23: 80.3 kg (177 lb).   Weight management plan: Discussed healthy diet and exercise guidelines    See Patient Instructions    NATHANAEL Patricio CNP  [facility]    Subjective   Madelyn is a 27 year old, presenting for the following health issues:  Cough        5/16/2023     2:12 PM   Additional Questions   Roomed by [facility]     History of Present Illness       Reason for visit:  Severe cold symptoms  Symptom onset:  Today  Symptoms include:  Cough congestion bloody mucus sore sore throat ear pain  Symptom intensity:  Severe  Symptom progression:  Staying the same  Had these symptoms before:  No  What makes it worse:  Not that i know of  What makes it better:  Not that i know of    She eats 4 or more servings of fruits and vegetables daily.She consumes 1 sweetened beverage(s) daily.She exercises with enough effort to increase her heart rate 9 or less minutes per day.  She exercises with enough effort to increase her heart rate 3 or less days per week.   She is taking medications regularly.    Today's PHQ-9         PHQ-9 Total Score: 6    PHQ-9 Q9 Thoughts of better off " dead/self-harm past 2 weeks :   Not at all    How difficult have these problems made it for you to do your work, take care of things at home, or get along with other people: Somewhat difficult       Acute Illness  Acute illness concerns:   Onset/Duration: 1 day  Symptoms:  Fever: YES  Chills/Sweats: No  Headache (location?): No  Sinus Pressure: No  Conjunctivitis:  No  Ear Pain: YES  Rhinorrhea: No  Congestion: YES  Sore Throat: YES  Cough: YES  Wheeze: No  Fatigue/Achiness: No  Sick/Strep Exposure: No  Therapies tried and outcome: theraflu          Review of Systems   Constitutional, HEENT, cardiovascular, pulmonary, gi and gu systems are negative, except as otherwise noted.      Objective           Vitals:  No vitals were obtained today due to virtual visit.    Physical Exam   GENERAL: Healthy, alert and no distress  EYES: Eyes grossly normal to inspection.  No discharge or erythema, or obvious scleral/conjunctival abnormalities.  RESP: No audible wheeze, cough, or visible cyanosis.  No visible retractions or increased work of breathing.    SKIN: Visible skin clear. No significant rash, abnormal pigmentation or lesions.  NEURO: Cranial nerves grossly intact.  Mentation and speech appropriate for age.  PSYCH: Mentation appears normal, affect normal/bright, judgement and insight intact, normal speech and appearance well-groomed.    Results for orders placed or performed in visit on 05/16/23   Streptococcus A Rapid Screen w/Reflex to PCR - Clinic Collect     Status: Normal    Specimen: Throat; Swab   Result Value Ref Range    Group A Strep antigen Negative Negative   Group A Streptococcus PCR Throat Swab     Status: Normal    Specimen: Throat; Swab   Result Value Ref Range    Group A strep by PCR Not Detected Not Detected    Narrative    The Xpert Xpress Strep A test, performed on the [employer], is a rapid, qualitative in vitro diagnostic test for the detection of Streptococcus pyogenes (Group A  ß-hemolytic Streptococcus, Strep A) in throat swab specimens from patients with signs and symptoms of pharyngitis. The Xpert Xpress Strep A test can be used as an aid in the diagnosis of Group A Streptococcal pharyngitis. The assay is not intended to monitor treatment for Group A Streptococcus infections. The Xpert Xpress Strep A test utilizes an automated real-time polymerase chain reaction (PCR) to detect Streptococcus pyogenes DNA.               Video-Visit Details    Originating Location (pt. Location): Home    Distant Location (provider location):  On-site  Platform used for Video Visit: Ana

## 2023-05-16 NOTE — LETTER
May 16, 2023      Madelyn Quintana  [address]  [place] [address]        To Whom It May Concern:    Madelyn Quintana  was seen on 516/2023.  Can return to work once fever free.  If fever persist greater then 48 hours should be re evaluated.    Denisse Gallardo CNP          Sincerely,        NATHANAEL Patricio CNP

## 2023-05-17 LAB — GROUP A STREP BY PCR: NOT DETECTED

## 2023-05-22 ASSESSMENT — PATIENT HEALTH QUESTIONNAIRE - PHQ9
SUM OF ALL RESPONSES TO PHQ QUESTIONS 1-9: 6
10. IF YOU CHECKED OFF ANY PROBLEMS, HOW DIFFICULT HAVE THESE PROBLEMS MADE IT FOR YOU TO DO YOUR WORK, TAKE CARE OF THINGS AT HOME, OR GET ALONG WITH OTHER PEOPLE: SOMEWHAT DIFFICULT

## 2023-05-23 ENCOUNTER — MYC MEDICAL ADVICE (OUTPATIENT)
Dept: OBGYN | Facility: CLINIC | Age: 28
End: 2023-05-23
Payer: COMMERCIAL

## 2023-06-06 ENCOUNTER — MYC MEDICAL ADVICE (OUTPATIENT)
Dept: FAMILY MEDICINE | Facility: CLINIC | Age: 28
End: 2023-06-06
Payer: COMMERCIAL

## 2023-06-06 DIAGNOSIS — L20.82 FLEXURAL ECZEMA: Primary | ICD-10-CM

## 2023-06-14 ENCOUNTER — TELEPHONE (OUTPATIENT)
Dept: FAMILY MEDICINE | Facility: CLINIC | Age: 28
End: 2023-06-14
Payer: COMMERCIAL

## 2023-06-14 NOTE — TELEPHONE ENCOUNTER
Patient Quality Outreach    Patient is due for the following:   Asthma  -  ACT needed  Physical Preventive Adult Physical      11/8/2022     3:06 PM 5/16/2023    10:56 AM   ACT Total Scores   ACT TOTAL SCORE (Goal Greater than or Equal to 20) 18 13   In the past 12 months, how many times did you visit the emergency room for your asthma without being admitted to the hospital? 0 0   In the past 12 months, how many times were you hospitalized overnight because of your asthma? 0 0     Next Steps:   Schedule a Annual Wellness Visit    Type of outreach:    Sent [employer] message.    Next Steps:  Reach out within 90 days via [employer].    Max number of attempts reached: No. Will try again in 90 days if patient still on fail list.    Questions for provider review:    None     Denise Mtz, [facility]  Chart routed to Care Team.

## 2023-07-10 DIAGNOSIS — J20.9 ACUTE BRONCHITIS WITH SYMPTOMS > 10 DAYS: ICD-10-CM

## 2023-07-11 RX ORDER — ALBUTEROL SULFATE 90 UG/1
AEROSOL, METERED RESPIRATORY (INHALATION)
Qty: 18 G | Refills: 1 | Status: SHIPPED | OUTPATIENT
Start: 2023-07-11 | End: 2023-11-08

## 2023-07-26 DIAGNOSIS — N91.2 AMENORRHEA: Primary | ICD-10-CM

## 2023-07-26 DIAGNOSIS — N97.9 FEMALE INFERTILITY: ICD-10-CM

## 2023-07-26 RX ORDER — MEDROXYPROGESTERONE ACETATE 10 MG
10 TABLET ORAL DAILY
Qty: 10 TABLET | Refills: 6 | Status: SHIPPED | OUTPATIENT
Start: 2023-07-26 | End: 2023-08-05

## 2023-07-26 RX ORDER — LETROZOLE 2.5 MG/1
2.5 TABLET, FILM COATED ORAL DAILY
Qty: 5 TABLET | Refills: 12 | Status: SHIPPED | OUTPATIENT
Start: 2023-07-26 | End: 2024-06-13

## 2023-08-07 ENCOUNTER — PRENATAL OFFICE VISIT (OUTPATIENT)
Dept: OBGYN | Facility: CLINIC | Age: 28
End: 2023-08-07
Payer: COMMERCIAL

## 2023-08-07 VITALS
HEART RATE: 78 BPM | WEIGHT: 177.4 LBS | BODY MASS INDEX: 31.43 KG/M2 | TEMPERATURE: 97.5 F | DIASTOLIC BLOOD PRESSURE: 66 MMHG | HEIGHT: 63 IN | SYSTOLIC BLOOD PRESSURE: 121 MMHG

## 2023-08-07 DIAGNOSIS — N97.9 PRIMARY FEMALE INFERTILITY: Primary | ICD-10-CM

## 2023-08-07 PROCEDURE — 76830 TRANSVAGINAL US NON-OB: CPT | Performed by: OBSTETRICS & GYNECOLOGY

## 2023-08-07 PROCEDURE — 99213 OFFICE O/P EST LOW 20 MIN: CPT | Mod: 25 | Performed by: OBSTETRICS & GYNECOLOGY

## 2023-08-07 NOTE — PROGRESS NOTES
"Initial /66 (BP Location: Left arm, Patient Position: Chair, Cuff Size: Adult Regular)   Pulse 78   Temp 97.5  F (36.4  C) (Tympanic)   Ht 1.594 m (5' 2.75\")   Wt 80.5 kg (177 lb 6.4 oz)   LMP 07/28/2023 (Exact Date)   BMI 31.68 kg/m   Estimated body mass index is 31.68 kg/m  as calculated from the following:    Height as of this encounter: 1.594 m (5' 2.75\").    Weight as of this encounter: 80.5 kg (177 lb 6.4 oz). .    "

## 2023-08-09 ENCOUNTER — ALLIED HEALTH/NURSE VISIT (OUTPATIENT)
Dept: OBGYN | Facility: CLINIC | Age: 28
End: 2023-08-09
Payer: COMMERCIAL

## 2023-08-09 DIAGNOSIS — N97.9 PRIMARY FEMALE INFERTILITY: Primary | ICD-10-CM

## 2023-08-09 PROCEDURE — 99207 PR NO CHARGE NURSE ONLY: CPT

## 2023-08-09 PROCEDURE — 96372 THER/PROPH/DIAG INJ SC/IM: CPT | Performed by: OBSTETRICS & GYNECOLOGY

## 2023-08-09 RX ORDER — CHORIONIC GONADOTROPIN 10000 UNIT
10000 KIT INTRAMUSCULAR ONCE
Status: COMPLETED | OUTPATIENT
Start: 2023-08-09 | End: 2023-08-09

## 2023-08-09 RX ADMIN — CHORIONIC GONADOTROPIN 10000 UNITS: KIT at 09:36

## 2023-08-09 NOTE — NURSING NOTE
"Initial LMP 07/28/2023 (Exact Date)  Estimated body mass index is 31.68 kg/m  as calculated from the following:    Height as of 8/7/23: 1.594 m (5' 2.75\").    Weight as of 8/7/23: 80.5 kg (177 lb 6.4 oz). .    "

## 2023-08-09 NOTE — PROGRESS NOTES
Clinic Administered Medication Documentation        Patient was given Chorionic Gonadotropin. Prior to medication administration, verified patient's identity using patient s name and date of birth. Please see MAR and medication order for additional information. Patient instructed to remain in clinic for 15 minutes and report any adverse reaction to staff immediately.    Vial/Syringe: Single dose vial. Was entire vial of medication used? Yes

## 2023-08-10 ENCOUNTER — OFFICE VISIT (OUTPATIENT)
Dept: OBGYN | Facility: CLINIC | Age: 28
End: 2023-08-10
Payer: COMMERCIAL

## 2023-08-10 ENCOUNTER — ALLIED HEALTH/NURSE VISIT (OUTPATIENT)
Dept: OBGYN | Facility: CLINIC | Age: 28
End: 2023-08-10
Payer: COMMERCIAL

## 2023-08-10 VITALS
SYSTOLIC BLOOD PRESSURE: 108 MMHG | WEIGHT: 180 LBS | DIASTOLIC BLOOD PRESSURE: 64 MMHG | HEIGHT: 63 IN | TEMPERATURE: 97 F | BODY MASS INDEX: 31.89 KG/M2 | HEART RATE: 78 BPM | RESPIRATION RATE: 16 BRPM

## 2023-08-10 DIAGNOSIS — N97.9 PRIMARY FEMALE INFERTILITY: Primary | ICD-10-CM

## 2023-08-10 PROCEDURE — 58322 ARTIFICIAL INSEMINATION: CPT | Performed by: OBSTETRICS & GYNECOLOGY

## 2023-08-10 PROCEDURE — 99207 PR NO CHARGE NURSE ONLY: CPT

## 2023-08-10 RX ORDER — CHORIONIC GONADOTROPIN 10000 UNIT
10000 KIT INTRAMUSCULAR ONCE
Status: ACTIVE | OUTPATIENT
Start: 2023-08-10

## 2023-08-10 NOTE — PROGRESS NOTES
Follicular US:  2.5mg letrazole     CD#10  C#3 with letrazole, switched due to lining thinning and PCOS-appearing ovaries  2x cycles with clomid OI/folliuclar US/trigger and IUI     Transvaginal US;   Mid-position uterus, EMS at 7mm, left ovary with 15 and 17mm dominant follicles, none in right ovary. Ovaries appear polycystic       A/P: 29 yo G0 with with primary infertility, anovulatory (PCOS likely) and male factor   Plan letrazole OI/folliuclar US/trigger and IUI  Plan trigger 8/16, IUI 8/17  Plan cycle day #21 progesterone level      Karina Bronson MD  OB/GYN

## 2023-08-10 NOTE — PROGRESS NOTES
"[facility]  OB/GYN Clinic    CC: IUI    Infertility treatments to date:   2x cycles with clomid OI, but never IUI  Cycle #1 with letrazole OI, trigger injection, IUI    S: Patient presents with her partner, who has left a fresh semen specimen in collection cup. Pt reports LMP Patient's last menstrual period was 07/28/2023 (exact date)., trigger injection yesterday. Denies any symptoms. Informed consent was singed after a discussion of the risks of intrauterine insemination (bleeding, infection, transmission of STIs, uterine cramping).     O:   VS: /64 (BP Location: Right arm, Patient Position: Chair, Cuff Size: Adult Regular)   Pulse 78   Temp 97  F (36.1  C) (Tympanic)   Resp 16   Ht 1.594 m (5' 2.75\")   Wt 81.6 kg (180 lb)   LMP 07/28/2023 (Exact Date)   BMI 32.14 kg/m      Procedure: The patient was placed in dorsal lithotomy position. A speculum was placed in the vagina and the cervix visualized. A specimen catheter labeled with the patient's information was placed through the cervix and the sample was injected. The patient tolerated the procedure well. There were no complications and no blood loss. The patient remained in our clinic with elevatd pelvis for 20 min after the insemination. Plan for day #21 progesterone. Plan for pregnancy test with missed menses.     I did discuss with the patient that the sperm looked grossly very dead. We discussed having sex at home versus repeat IUI tomorrow. They want repeat IUI tomorrow.     Karina Bronson MD  8/10/2023    "

## 2023-08-10 NOTE — NURSING NOTE
"Initial /64 (BP Location: Right arm, Patient Position: Chair, Cuff Size: Adult Regular)   Pulse 78   Temp 97  F (36.1  C) (Tympanic)   Resp 16   Ht 1.594 m (5' 2.75\")   Wt 81.6 kg (180 lb)   LMP 07/28/2023 (Exact Date)   BMI 32.14 kg/m   Estimated body mass index is 32.14 kg/m  as calculated from the following:    Height as of this encounter: 1.594 m (5' 2.75\").    Weight as of this encounter: 81.6 kg (180 lb). .    Susanne Alcala, [facility]    "

## 2023-08-10 NOTE — PROGRESS NOTES
Patients Significant other came into clinic to leave a sperm sample for patients scheduled IUI appointment with Dr. Bronson. Please see office visit note for further documentation.    Susanne Alcala on 8/10/2023 at 9:16 AM

## 2023-08-11 ENCOUNTER — OFFICE VISIT (OUTPATIENT)
Dept: OBGYN | Facility: CLINIC | Age: 28
End: 2023-08-11
Payer: COMMERCIAL

## 2023-08-11 ENCOUNTER — ALLIED HEALTH/NURSE VISIT (OUTPATIENT)
Dept: OBGYN | Facility: CLINIC | Age: 28
End: 2023-08-11
Payer: COMMERCIAL

## 2023-08-11 ENCOUNTER — VIRTUAL VISIT (OUTPATIENT)
Dept: OBGYN | Facility: CLINIC | Age: 28
End: 2023-08-11
Payer: COMMERCIAL

## 2023-08-11 VITALS
DIASTOLIC BLOOD PRESSURE: 56 MMHG | BODY MASS INDEX: 31.71 KG/M2 | TEMPERATURE: 98.6 F | RESPIRATION RATE: 16 BRPM | HEART RATE: 63 BPM | SYSTOLIC BLOOD PRESSURE: 120 MMHG | WEIGHT: 179 LBS | HEIGHT: 63 IN

## 2023-08-11 DIAGNOSIS — N97.9 FEMALE INFERTILITY: Primary | ICD-10-CM

## 2023-08-11 DIAGNOSIS — N97.9 PRIMARY FEMALE INFERTILITY: Primary | ICD-10-CM

## 2023-08-11 PROCEDURE — 99207 PR NO CHARGE NURSE ONLY: CPT

## 2023-08-11 PROCEDURE — 99212 OFFICE O/P EST SF 10 MIN: CPT | Mod: 93 | Performed by: OBSTETRICS & GYNECOLOGY

## 2023-08-11 PROCEDURE — 58322 ARTIFICIAL INSEMINATION: CPT | Performed by: OBSTETRICS & GYNECOLOGY

## 2023-08-11 NOTE — PROGRESS NOTES
Patients Significant other came into clinic to leave a sperm sample for patients scheduled IUI appointment with Dr. Wallace. Please see office visit note for further documentation.    Orly Buckley LPN on 8/11/2023 at 9:07

## 2023-08-11 NOTE — PROGRESS NOTES
"[facility]  OB/GYN Clinic    CC: IUI    \  S: Patient presents with her partner, who has left a fresh semen specimen in collection cup. Pt reports LMP Patient's last menstrual period was 07/28/2023 (exact date). , Denies any symptoms. Informed consent was singed after a discussion of the risks of intrauterine insemination (bleeding, infection, transmission of STIs, uterine cramping).       O:   VS: /56 (BP Location: Left arm, Patient Position: Chair, Cuff Size: Adult Regular)   Pulse 63   Temp 98.6  F (37  C) (Tympanic)   Resp 16   Ht 1.594 m (5' 2.75\")   Wt 81.2 kg (179 lb)   LMP 07/28/2023 (Exact Date)   BMI 31.96 kg/m      Procedure: The patient was placed in dorsal lithotomy position. A speculum was placed in the vagina and the cervix visualized. A specimen catheter labeled with the patient's information was placed through the cervix and the sample was injected. The patient tolerated the procedure well. There were no complications and no blood loss. The patient remained in our clinic with elevatd pelvis for 20 min after the insemination. Plan for day #21 progesterone. Plan for pregnancy test with missed menses.     Wendy Wallace MD  8/11/2023      Of note, semen sample with viable sperm but not a large number visible in field. Partner with questions regarding repeating given many dead sperm noted yesterday. Encouraged visit with usual provider to discuss next steps.      "

## 2023-08-11 NOTE — NURSING NOTE
"Initial /56 (BP Location: Left arm, Patient Position: Chair, Cuff Size: Adult Regular)   Pulse 63   Temp 98.6  F (37  C) (Tympanic)   Resp 16   Ht 1.594 m (5' 2.75\")   Wt 81.2 kg (179 lb)   LMP 07/28/2023 (Exact Date)   BMI 31.96 kg/m   Estimated body mass index is 31.96 kg/m  as calculated from the following:    Height as of this encounter: 1.594 m (5' 2.75\").    Weight as of this encounter: 81.2 kg (179 lb). .    "

## 2023-08-11 NOTE — PROGRESS NOTES
Madelyn is a 28 year old who is being evaluated via a billable telephone visit.      What phone number would you like to be contacted at? [phone]    How would you like to obtain your AVS? Livan    Distant Location (provider location):  On-site, patient was located at home when I performed this telephone visit.       Subjective   Madelyn is a 28 year old, presenting for the following health issues:  Review options for fertility treatments.   Madelyn is a patient of mine that I have been following for primary female infertility.  She has issues with anovulation and we attempted ovulation induction first with Clomid and now marked most recently with letrozole.  She had excellent response to 2.5 mg of letrozole and did make 2 mature follicles on her follicular ultrasound.  I did perform a trigger injection and then subsequent intrauterine insemination.  Fortunately it was noted on her partner semen analysis that she had mostly dead sperm to the performance of the sperm wash.  We did discuss the option of repeating the sperm wash the following day and repeating intrauterine insemination to just see if it was an off sample or if there was some sort of issue with her sperm wash.  Unfortunately on the second intrauterine insemination and sperm wash if we did see the same results of mostly dead sperm very few viable sperm were noted on her microscope.  Blaine and her partner are obviously upset by these findings and wondering where to go from here.  They did report that they had intercourse last night in order to optimize the scenario in the setting of 2 mature follicles.      Objective           Vitals:  No vitals were obtained today due to virtual visit.    Physical Exam   healthy, alert, and no distress  PSYCH: Alert and oriented times 3; coherent speech, normal   rate and volume, able to articulate logical thoughts, able   to abstract reason, no tangential thoughts, no hallucinations   or delusions  Her affect is  normal  RESP: No cough, no audible wheezing, able to talk in full sentences  Remainder of exam unable to be completed due to telephone visits    Raul Carlisle   Semen Analysis  Collection Method  Masturbation     Collection Site  [facility]    Time of Receipt  11:16 AM    Time of Analysis  11:39 AM    Analysis Temp - Centigrade centigrade 23.0    Abstinence days 2 - 7 days 2    Liquefied Yes Yes    Viscous No No    Agglutination No No    pH >=7.2 pH 7.2    Volume >=1.4 mL 3.8    Sperm Concentration >=16.0 million/ml 35.5    Total Sperm Number >=39.0 million 135.0    Progressive motility >=30 % 33    Non-progressive motility % 7    Immotile % 60    Total Progressive Motile Number million 45.00    Vitality     Comment: Vitality determined if <32% progressive motility.    Normal Sperm Morphology >=4 % Normal forms 0 Low     Abnormal Sperm Morphology  100          Head Defect % 100          Midpiece Defect % 41          Tail Defect % 16    Round Cells <=2.0 million/ml 0.2    Consent to Release to Partner  Yes   Resulting Agency  [facility]             Narrative  Performed by: [facility]  Macroscopic gelatinous particles present.    Aggregation present    Signal flags identify report values outside the World Health Organization (2021) reference limits for semen characteristics.             A/P: I did review the findings with both Raul and Madelyn of his semen analysis with total progressive motility at 45 million however they did not see any normal forms.  In the setting IUI can be the treatment to help optimize suboptimal sperm.  However findings on the sperm wash do appear to be consistent with the findings of the semen analysis.  I did discuss that I would recommend seeing if she obtains pregnancy this cycle and if so this conversation would be no.  However if she does not get pregnant I offered them to repeat the semen analysis and then have a urology referral to see if there are  any options for optimizing sperm.  Additionally if our sperm wash is causing further dead sperm we could do intravaginal or paracervical insemination with a fresh sample.  Did discuss that intrauterine insemination would not be an option unless we use wash the sperm due to the risk of causing an allergic reaction.  I did discuss the alternative of donor sperm but at this time they are hoping to avoid this and used either sperm if at all possible.`  If they do not obtain pregnancy this cycle they would like to repeat the semen analysis and then have a referral to urogynecology to see if there is any options for optimizing sperm.    Phone call duration: 9 minutes, start time 2:10 PM, stop time 2:19, I spent an additional 3 minutes on documentation, chart review and care coordination total of 12 minutes.    Karina Bronson MD  OB/GYN

## 2023-08-17 ENCOUNTER — LAB (OUTPATIENT)
Dept: LAB | Facility: CLINIC | Age: 28
End: 2023-08-17
Payer: COMMERCIAL

## 2023-08-17 DIAGNOSIS — N97.9 PRIMARY FEMALE INFERTILITY: ICD-10-CM

## 2023-08-17 LAB — PROGEST SERPL-MCNC: 14.5 NG/ML

## 2023-08-17 PROCEDURE — 84144 ASSAY OF PROGESTERONE: CPT

## 2023-08-17 PROCEDURE — 36415 COLL VENOUS BLD VENIPUNCTURE: CPT

## 2023-09-04 ENCOUNTER — MYC MEDICAL ADVICE (OUTPATIENT)
Dept: FAMILY MEDICINE | Facility: CLINIC | Age: 28
End: 2023-09-04
Payer: COMMERCIAL

## 2023-11-07 ENCOUNTER — OFFICE VISIT (OUTPATIENT)
Dept: DERMATOLOGY | Facility: CLINIC | Age: 28
End: 2023-11-07
Payer: COMMERCIAL

## 2023-11-07 DIAGNOSIS — L20.82 FLEXURAL ECZEMA: ICD-10-CM

## 2023-11-07 DIAGNOSIS — L20.84 INTRINSIC ATOPIC DERMATITIS: Primary | ICD-10-CM

## 2023-11-07 PROCEDURE — 99203 OFFICE O/P NEW LOW 30 MIN: CPT | Performed by: DERMATOLOGY

## 2023-11-07 RX ORDER — BETAMETHASONE DIPROPIONATE 0.5 MG/G
CREAM TOPICAL
Qty: 100 G | Refills: 3 | Status: SHIPPED | OUTPATIENT
Start: 2023-11-07

## 2023-11-07 ASSESSMENT — ASTHMA QUESTIONNAIRES: ACT_TOTALSCORE: 14

## 2023-11-07 ASSESSMENT — PATIENT HEALTH QUESTIONNAIRE - PHQ9
SUM OF ALL RESPONSES TO PHQ QUESTIONS 1-9: 8
10. IF YOU CHECKED OFF ANY PROBLEMS, HOW DIFFICULT HAVE THESE PROBLEMS MADE IT FOR YOU TO DO YOUR WORK, TAKE CARE OF THINGS AT HOME, OR GET ALONG WITH OTHER PEOPLE: SOMEWHAT DIFFICULT
SUM OF ALL RESPONSES TO PHQ QUESTIONS 1-9: 8

## 2023-11-07 NOTE — PATIENT INSTRUCTIONS
Start:  Claritin every morning  Zyrtec ever evening  Stronger prescription cream.    --Put 1/2c. of bleach into a full tub of water. Baths only need to be 3-5 minutes.       Patient Education       Proper skin care from [facility]:    -Eliminate harsh soaps as they strip the natural oils from the skin, often resulting in dry itchy skin ( i.e. Dial, Zest, [place] Spring)  -Use mild soaps such as Cetaphil or Dove Sensitive Skin in the shower. You do not need to use soap on arms, legs, and trunk every time you shower unless visibly soiled.   -Avoid hot or cold showers.  -After showering, lightly dry off and apply moisturizing within 2-3 minutes. This will help trap moisture in the skin.   -Aggressive use of a moisturizer at least 1-2 times a day to the entire body (including -Vanicream, Cetaphil, Aquaphor or Cerave) and moisturize hands after every washing.  -We recommend using moisturizers that come in a tub that needs to be scooped out, not a pump. This has more of an oil base. It will hold moisture in your skin much better than a water base moisturizer. The above recommended are non-pore clogging.      Follow up in 3 months with Dr. Arnold.

## 2023-11-07 NOTE — PROGRESS NOTES
Madelyn Quintana , a 28 year old year old female patient, I was asked to see by ALLAN Delgado for atopic derm.  Patient has no other skin complaints today.  Remainder of the HPI, Meds, PMH, Allergies, FH, and SH was reviewed in chart.      Past Medical History:   Diagnosis Date    Abnormal Pap smear of cervix 04/23/2021    CARDIOVASCULAR SCREENING; LDL GOAL LESS THAN 160 08/01/2014       History reviewed. No pertinent surgical history.     Family History   Problem Relation Age of Onset    Unknown/Adopted Paternal Grandmother     Unknown/Adopted Paternal Grandfather     Unknown/Adopted Maternal Grandfather        Social History     Socioeconomic History    Marital status: Single     Spouse name: Not on file    Number of children: 0    Years of education: Not on file    Highest education level: Not on file   Occupational History    Occupation: [employer]     Employer: [employer]   Tobacco Use    Smoking status: Former     Types: Vaping Device    Smokeless tobacco: Never    Tobacco comments:     5 cigarettes a day    Vaping Use    Vaping Use: Former    Substances: Nicotine, Flavoring    Devices: Disposable   Substance and Sexual Activity    Alcohol use: Yes     Comment: social    Drug use: No    Sexual activity: Yes     Partners: Male     Birth control/protection: None   Other Topics Concern    Parent/sibling w/ CABG, MI or angioplasty before 65F 55M? Not Asked   Social History Narrative    Not on file     Social Determinants of Health     Financial Resource Strain: Not on file   Food Insecurity: Not on file   Transportation Needs: Not on file   Physical Activity: Not on file   Stress: Not on file   Social Connections: Not on file   Interpersonal Safety: Not on file   Housing Stability: Not on file       Outpatient Encounter Medications as of 11/7/2023   Medication Sig Dispense Refill    albuterol (VENTOLIN HFA) 108 (90 Base) MCG/ACT inhaler INHALE 2 PUFFS INTO THE LUNGS EVERY 6 HOURS AS NEEDED  FOR SHORTNESS OF BREATH, DIFFICULTY BREATHING OR WHEEZING. (Patient not taking: Reported on 8/11/2023) 18 g 1    benzonatate (TESSALON) 200 MG capsule Take 1 capsule (200 mg) by mouth 3 times daily as needed for cough (Patient not taking: Reported on 8/7/2023) 20 capsule 0    fluocinonide (LIDEX) 0.05 % external cream Apply sparingly to affected area twice daily as needed.  Do not apply to face. (Patient not taking: Reported on 8/7/2023) 30 g 1    fluticasone (ARNUITY ELLIPTA) 50 MCG/ACT inhaler Inhale 1 puff into the lungs daily (Patient not taking: Reported on 8/10/2023) 1 each 0    hydrOXYzine (ATARAX) 25 MG tablet Take 1-2 tablets (25-50 mg) by mouth 3 times daily as needed for anxiety (and at bedtime) (Patient not taking: Reported on 8/10/2023) 30 tablet 1    hydrOXYzine (VISTARIL) 50 MG capsule Take 1-2 capsules ( mg) at bedtime as needed for sleep. (Patient not taking: Reported on 8/10/2023) 60 capsule 3    IBUPROFEN PO  (Patient not taking: Reported on 8/7/2023)      letrozole (FEMARA) 2.5 MG tablet Take 1 tablet (2.5 mg) by mouth daily for 5 days 5 tablet 12    LORazepam (ATIVAN) 0.5 MG tablet Take 0.5-1 tablets (0.25-0.5 mg) by mouth every 6 hours as needed for anxiety (Patient not taking: Reported on 8/10/2023) 10 tablet 0    Prenatal Vit-Fe Fumarate-FA (PRENATAL MULTIVITAMIN W/IRON) 27-0.8 MG tablet Take 1 tablet by mouth daily (Patient not taking: Reported on 8/10/2023)      progesterone (PROMETRIUM) 200 MG capsule Take 1 capsule (200 mg) by mouth daily (Patient not taking: Reported on 8/7/2023) 30 capsule 3    sertraline (ZOLOFT) 100 MG tablet Take 2 tablets (200 mg) by mouth daily (Patient not taking: Reported on 8/10/2023) 180 tablet 0    traZODone (DESYREL) 100 MG tablet Take 1 tablet (100 mg) by mouth At Bedtime (Patient not taking: Reported on 8/10/2023) 90 tablet 3     Facility-Administered Encounter Medications as of 11/7/2023   Medication Dose Route Frequency Provider Last Rate Last Admin     chorionic gonadotropin (NOVAREL/PREGNYL) injection 10,000 Units  10,000 Units Intramuscular Once Karina Bronson MD                 Review Of Systems  Skin: As above  Eyes: negative  Ears/Nose/Throat: negative  Respiratory: No shortness of breath, dyspnea on exertion, cough, or hemoptysis  Cardiovascular: negative  Gastrointestinal: negative  Genitourinary: negative  Musculoskeletal: negative  Neurologic: negative  Psychiatric: negative  Hematologic/Lymphatic/Immunologic: negative  Endocrine: negative      O:   NAD, WDWN, Alert & Oriented, Mood & Affect wnl, Vitals stable   General appearance ismael iv   Vitals stable   Alert, oriented and in no acute distress   Eczmeatous plaques on trunk       Eyes: Conjunctivae/lids:Normal     ENT: Lips, buccal mucosa, tongue: normal    MSK:Normal    Cardiovascular: peripheral edema none    Pulm: Breathing Normal    Neuro/Psych: Orientation:Normal; Mood/Affect:Normal      A/P:  Atopic derm  Skin care discussed with patient   Emollient use discussed with patient   Bleach baths:    We recommend a bleach bath once per week.  With eczema or dry skin, the skin barrier is impaired, making the skin more prone to infections. Doing a bleach bath once per week is safe and helps keep skin from becoming infected.   --Put 1/2c. of blech into a full tub of water. Baths only need to be 3-5 minutes.    Orals, topiclas, light discussed with patient   She prefers topiclas  Betamethasone twice daily  Claritin in am   Zyrtec bedtime  Return to clinic 3 months  It was a pleasure speaking to Madelyn Quintana today.  Previous clinic  notes and pertinent laboratory tests were reviewed prior to Madelyn Quintana's visit.  Skin care regimen reviewed with patient: Eliminate harsh soaps, i.e. Dial, zest, irsih spring; Mild soaps such as Cetaphil or Dove sensitive skin, avoid hot or cold showers, aggressive use of emollients including vanicream, cetaphil or cerave discussed with patient.

## 2023-11-07 NOTE — LETTER
11/7/2023         RE: Madelyn Quintana  [id number] [place] [address]        Dear Colleague,    Thank you for referring your patient, Madelyn Quintana, to the [facility]. Please see a copy of my visit note below.    Madelyn Quintana , a 28 year old year old female patient, I was asked to see by ALLAN Delgado for atopic derm.  Patient has no other skin complaints today.  Remainder of the HPI, Meds, PMH, Allergies, FH, and SH was reviewed in chart.      Past Medical History:   Diagnosis Date     Abnormal Pap smear of cervix 04/23/2021     CARDIOVASCULAR SCREENING; LDL GOAL LESS THAN 160 08/01/2014       History reviewed. No pertinent surgical history.     Family History   Problem Relation Age of Onset     Unknown/Adopted Paternal Grandmother      Unknown/Adopted Paternal Grandfather      Unknown/Adopted Maternal Grandfather        Social History     Socioeconomic History     Marital status: Single     Spouse name: Not on file     Number of children: 0     Years of education: Not on file     Highest education level: Not on file   Occupational History     Occupation: [employer]     Employer: [employer]   Tobacco Use     Smoking status: Former     Types: Vaping Device     Smokeless tobacco: Never     Tobacco comments:     5 cigarettes a day    Vaping Use     Vaping Use: Former     Substances: Nicotine, Flavoring     Devices: Disposable   Substance and Sexual Activity     Alcohol use: Yes     Comment: social     Drug use: No     Sexual activity: Yes     Partners: Male     Birth control/protection: None   Other Topics Concern     Parent/sibling w/ CABG, MI or angioplasty before 65F 55M? Not Asked   Social History Narrative     Not on file     Social Determinants of Health     Financial Resource Strain: Not on file   Food Insecurity: Not on file   Transportation Needs: Not on file   Physical Activity: Not on file   Stress: Not on file   Social Connections: Not on file    Interpersonal Safety: Not on file   Housing Stability: Not on file       Outpatient Encounter Medications as of 11/7/2023   Medication Sig Dispense Refill     albuterol (VENTOLIN HFA) 108 (90 Base) MCG/ACT inhaler INHALE 2 PUFFS INTO THE LUNGS EVERY 6 HOURS AS NEEDED FOR SHORTNESS OF BREATH, DIFFICULTY BREATHING OR WHEEZING. (Patient not taking: Reported on 8/11/2023) 18 g 1     benzonatate (TESSALON) 200 MG capsule Take 1 capsule (200 mg) by mouth 3 times daily as needed for cough (Patient not taking: Reported on 8/7/2023) 20 capsule 0     fluocinonide (LIDEX) 0.05 % external cream Apply sparingly to affected area twice daily as needed.  Do not apply to face. (Patient not taking: Reported on 8/7/2023) 30 g 1     fluticasone (ARNUITY ELLIPTA) 50 MCG/ACT inhaler Inhale 1 puff into the lungs daily (Patient not taking: Reported on 8/10/2023) 1 each 0     hydrOXYzine (ATARAX) 25 MG tablet Take 1-2 tablets (25-50 mg) by mouth 3 times daily as needed for anxiety (and at bedtime) (Patient not taking: Reported on 8/10/2023) 30 tablet 1     hydrOXYzine (VISTARIL) 50 MG capsule Take 1-2 capsules ( mg) at bedtime as needed for sleep. (Patient not taking: Reported on 8/10/2023) 60 capsule 3     IBUPROFEN PO  (Patient not taking: Reported on 8/7/2023)       letrozole (FEMARA) 2.5 MG tablet Take 1 tablet (2.5 mg) by mouth daily for 5 days 5 tablet 12     LORazepam (ATIVAN) 0.5 MG tablet Take 0.5-1 tablets (0.25-0.5 mg) by mouth every 6 hours as needed for anxiety (Patient not taking: Reported on 8/10/2023) 10 tablet 0     Prenatal Vit-Fe Fumarate-FA (PRENATAL MULTIVITAMIN W/IRON) 27-0.8 MG tablet Take 1 tablet by mouth daily (Patient not taking: Reported on 8/10/2023)       progesterone (PROMETRIUM) 200 MG capsule Take 1 capsule (200 mg) by mouth daily (Patient not taking: Reported on 8/7/2023) 30 capsule 3     sertraline (ZOLOFT) 100 MG tablet Take 2 tablets (200 mg) by mouth daily (Patient not taking: Reported on  8/10/2023) 180 tablet 0     traZODone (DESYREL) 100 MG tablet Take 1 tablet (100 mg) by mouth At Bedtime (Patient not taking: Reported on 8/10/2023) 90 tablet 3     Facility-Administered Encounter Medications as of 11/7/2023   Medication Dose Route Frequency Provider Last Rate Last Admin     chorionic gonadotropin (NOVAREL/PREGNYL) injection 10,000 Units  10,000 Units Intramuscular Once Karina Bronson MD                 Review Of Systems  Skin: As above  Eyes: negative  Ears/Nose/Throat: negative  Respiratory: No shortness of breath, dyspnea on exertion, cough, or hemoptysis  Cardiovascular: negative  Gastrointestinal: negative  Genitourinary: negative  Musculoskeletal: negative  Neurologic: negative  Psychiatric: negative  Hematologic/Lymphatic/Immunologic: negative  Endocrine: negative      O:   NAD, WDWN, Alert & Oriented, Mood & Affect wnl, Vitals stable   General appearance ismael iv   Vitals stable   Alert, oriented and in no acute distress   Eczmeatous plaques on trunk       Eyes: Conjunctivae/lids:Normal     ENT: Lips, buccal mucosa, tongue: normal    MSK:Normal    Cardiovascular: peripheral edema none    Pulm: Breathing Normal    Neuro/Psych: Orientation:Normal; Mood/Affect:Normal      A/P:  Atopic derm  Skin care discussed with patient   Emollient use discussed with patient   Bleach baths:    We recommend a bleach bath once per week.  With eczema or dry skin, the skin barrier is impaired, making the skin more prone to infections. Doing a bleach bath once per week is safe and helps keep skin from becoming infected.   --Put 1/2c. of blech into a full tub of water. Baths only need to be 3-5 minutes.    Orals, topiclas, light discussed with patient   She prefers topiclas  Betamethasone twice daily  Claritin in am   Zyrtec bedtime  Return to clinic 3 months  It was a pleasure speaking to Madelyn Quintana today.  Previous clinic  notes and pertinent laboratory tests were reviewed prior to Madelyn PATEL  Mariano's visit.  Skin care regimen reviewed with patient: Eliminate harsh soaps, i.e. Dial, zest, irsih spring; Mild soaps such as Cetaphil or Dove sensitive skin, avoid hot or cold showers, aggressive use of emollients including vanicream, cetaphil or cerave discussed with patient.        Again, thank you for allowing me to participate in the care of your patient.        Sincerely,        Spencer Arnold MD

## 2023-11-07 NOTE — COMMUNITY RESOURCES LIST (ENGLISH)
11/07/2023   [facility] People  N/A  For questions about this resource list or additional care needs, please contact your primary care clinic or care manager.  Phone: [phone]   Email: N/A   Address: [address] [place] [address]   Hours: N/A        Food and Nutrition       Food pantry  1  [employer] Food Shelf - [facility] Distance: 4.8 miles      Delivery, Pickup   [address] [place] [address]  Language: English  Hours: Mon 9:00 AM - 5:00 PM , Tue 11:00 AM - 5:00 PM , Thu 9:00 AM - 5:00 PM  Fees: Free   Phone: (744) 989-4873 Email: mail@[employer] Website: https://www.[employer]/our-work/food-access-and-equity/     2  [facility] Hand - Food Shelf Distance: 7.83 miles      In-Person   [address] [place] [address]  Language: English  Hours: Mon - Sun Appt. Only  Fees: Free   Phone: (720) 927-8316 Email: briseida@[employer] my [employer]ocket (PRIMP) Website: http://www.[employer].org     SNAP application assistance  3  [facility] - [place] Family Investment Program (MFIP) - [place] Family Investment Program (MFIP) - SNAP application assistance Distance: 8.14 miles      In-Person, Phone/Virtual   [address] [address]  Language: English  Hours: Mon - Fri 8:00 AM - 4:30 PM  Fees: Free   Phone: (457) 575-5162 Email: imgeneralquestions@[facility].gov Website: https://www.[facility]./499/MFIP-Biennial-Service-Agreement-VCA-Plan     4  [facility] Community [facility] [place] [facility] - [place] Office Distance: 9.26 miles      In-Person, Phone/Virtual   [address] [place] [address]  Language: English  Hours: Mon - Fri 8:00 AM - 4:30 PM  Fees: Free   Phone: (139) 546-2037 Email: dax@[employer] Website: https://www.[facility].org/agency-information     Soup kitchen or free meals  5  [facility] - Family Table Meals - Family Table  Meal Distance: 21.42 miles      Pickup   [address] [place] [address]  Language: English  Hours: Thu 5:00 PM - 6:30 PM  Fees: Free   Phone: (263) 539-4831 Email: peter@[employer] Website: http://www.[employer]     6  [facility] - Family Table Meal Distance: 21.76 miles      In-Person, Pickup   [address] [place] [address]  Language: English  Hours: Thu 5:30 PM - 6:30 PM  Fees: Free   Phone: (846) 716-2384 Email: office@[employer] Website: http://www.[employer].Managed Methods          Important Numbers & Websites       Emergency Services   911  [facility] Services   311  Poison Control   (980) 564-2487  Suicide Prevention Lifeline   (178) 662-8779 (TALK)  Child Abuse Hotline   (328) 972-5445 (4-A-Child)  Sexual Assault Hotline   (803) 398-5552 (HOPE)  National Runaway Safeline   (340) 509-4435 (RUNAWAY)  All-Options Talkline   (559) 433-9606  Substance Abuse Referral   (358) 641-1663 (HELP)

## 2023-11-08 ENCOUNTER — VIRTUAL VISIT (OUTPATIENT)
Dept: FAMILY MEDICINE | Facility: CLINIC | Age: 28
End: 2023-11-08
Payer: COMMERCIAL

## 2023-11-08 ENCOUNTER — TELEPHONE (OUTPATIENT)
Dept: FAMILY MEDICINE | Facility: CLINIC | Age: 28
End: 2023-11-08

## 2023-11-08 DIAGNOSIS — R94.2 ABNORMAL PFTS: ICD-10-CM

## 2023-11-08 DIAGNOSIS — E66.09 CLASS 1 OBESITY DUE TO EXCESS CALORIES WITH SERIOUS COMORBIDITY AND BODY MASS INDEX (BMI) OF 32.0 TO 32.9 IN ADULT: ICD-10-CM

## 2023-11-08 DIAGNOSIS — F33.1 MODERATE EPISODE OF RECURRENT MAJOR DEPRESSIVE DISORDER (H): Primary | ICD-10-CM

## 2023-11-08 DIAGNOSIS — R06.02 SHORTNESS OF BREATH: ICD-10-CM

## 2023-11-08 DIAGNOSIS — E66.811 CLASS 1 OBESITY DUE TO EXCESS CALORIES WITH SERIOUS COMORBIDITY AND BODY MASS INDEX (BMI) OF 32.0 TO 32.9 IN ADULT: ICD-10-CM

## 2023-11-08 DIAGNOSIS — J45.20 MILD INTERMITTENT REACTIVE AIRWAY DISEASE WITHOUT COMPLICATION: ICD-10-CM

## 2023-11-08 DIAGNOSIS — J98.4 RESTRICTIVE LUNG DISEASE: ICD-10-CM

## 2023-11-08 PROBLEM — G47.00 INSOMNIA: Status: ACTIVE | Noted: 2023-11-08

## 2023-11-08 PROBLEM — L30.9 ECZEMA: Status: ACTIVE | Noted: 2023-11-08

## 2023-11-08 PROBLEM — J45.909 REACTIVE AIRWAY DISEASE: Status: ACTIVE | Noted: 2023-11-08

## 2023-11-08 PROCEDURE — 99215 OFFICE O/P EST HI 40 MIN: CPT | Mod: VID | Performed by: PHYSICIAN ASSISTANT

## 2023-11-08 RX ORDER — ALBUTEROL SULFATE 90 UG/1
AEROSOL, METERED RESPIRATORY (INHALATION)
Qty: 18 G | Refills: 1 | Status: SHIPPED | OUTPATIENT
Start: 2023-11-08

## 2023-11-08 NOTE — TELEPHONE ENCOUNTER
PRIOR AUTHORIZATION DENIED    Medication: SEMAGLUTIDE-WEIGHT MANAGEMENT 0.25 MG/0.5ML SC SOAJ  Insurance Company: [employer] - Phone [phone] Fax [phone]  Denial Date: 11/7/2023  Denial Rational:     Appeal Information:     Patient Notified: No

## 2023-11-08 NOTE — PROGRESS NOTES
Madelyn is a 28 year old who is being evaluated via a billable video visit.      How would you like to obtain your AVS? MyChart  If the video visit is dropped, the invitation should be resent by: Text to cell phone: [phone]  Will anyone else be joining your video visit? No      Assessment & Plan   Moderate episode of recurrent major depressive disorder (H)  Class 1 obesity due to excess calories with serious comorbidity and body mass index (BMI) of 32.0 to 32.9 in adult  Has had significant difficulty losing weight and has only gained weight over the last few years. This is definitely multifactorial and she has attempted many lifestyle changes including more exercise, appropriate diet and has been going to counseling for mental health.  Unfortunately her weight does play a large role in her mental health symptoms and worsens her depression.  Shared decision-making was discussed today and I do think that she would benefit from weight loss.  We will attempt to get Wegovy covered and if affordable/available she will start this.  In the meantime, we will also get lab work to ensure no other cause that is working against her.  If she starts Wegovy she will follow-up in 3 months and we will send additional refills of will be with higher doses to her pharmacy.  Of note she did mention some passive thoughts of SI.  These are chronic and stable.  She sees a therapist regularly and is on medicine.  She will follow-up with them as scheduled.  - Semaglutide-Weight Management (WEGOVY) 0.25 MG/0.5ML pen; Inject 0.25 mg Subcutaneous once a week  - Lipid panel reflex to direct LDL Fasting; Future  - Hemoglobin A1c; Future  - TSH with free T4 reflex; Future  - Comprehensive metabolic panel; Future    Mild intermittent reactive airway disease without complication  Restrictive lung disease  Here for recheck on her asthma.  According to chart review she only has a history of reactive airway disease which was diagnosed 5 years ago.  " I am really unclear about why this was added to her chart.  She has had PFTs while she was in the hospital about 4 years ago that showed actually a restrictive pattern, although mild.  There is no obstruction or change with bronchodilator.  She says that Arnuity has not been helpful and albuterol does help but it causes some sort of stinging initially before it starts to improve her symptoms.  I think it makes sense to recheck her PFTs and add a DLCO test to confirm a diagnosis of restriction.  If that is the case, we will move forward with a high-resolution chest CT and I will evaluate for external causes of restriction.  Lastly we will add a CBC to her blood work to check for eosinophilia.  - albuterol (VENTOLIN HFA) 108 (90 Base) MCG/ACT inhaler; INHALE 2 PUFFS INTO THE LUNGS EVERY 6 HOURS AS NEEDED FOR SHORTNESS OF BREATH, DIFFICULTY BREATHING OR WHEEZING.  - General PFT Lab (Please always keep checked); Future  - Pulmonary Function Test; Future  - CBC with platelets; Future     BMI:   Estimated body mass index is 31.96 kg/m  as calculated from the following:    Height as of 8/11/23: 1.594 m (5' 2.75\").    Weight as of 8/11/23: 81.2 kg (179 lb).       Depression Screening Follow Up      11/7/2023     2:52 PM   PHQ   PHQ-9 Total Score 8   Q9: Thoughts of better off dead/self-harm past 2 weeks Several days   F/U: Thoughts of suicide or self-harm Yes   F/U: Self harm-plan No   F/U: Self-harm action No   F/U: Safety concerns No       Follow Up      Follow Up Actions Taken  Crisis resource information provided in the After Visit Summary  Scheduled appointment with [facility]    Discussed the following ways the patient can remain in a safe environment:      I spent a total of 40 minutes on the day of the visit.   Time spent by me doing chart review, history and exam, documentation and further activities per the note    Roman Luevano PA-C  [facility]    Kyra Joshi is a 28 year old, " "presenting for the following health issues:  No chief complaint on file.        11/8/2023     6:53 AM   Additional Questions   Roomed by Nelia PATEL   Accompanied by Self         11/8/2023     6:53 AM   Patient Reported Additional Medications   Patient reports taking the following new medications .       History of Present Illness     Asthma:  She presents for follow up of asthma.  She has no cough, some wheezing, and some shortness of breath.  She is using a relief medication a few times a week. She typically misses taking her controller medication 7 time(s) per week. Patient is aware of the following triggers: animal dander, cold air, exercise or sports, mold, smoke, strong odors and fumes and upper respiratory infections. The patient has not had a visit to the Emergency Room, Urgent Care or Hospital due to asthma since the last clinic visit.     Reason for visit:  Get asthma testing and flu shot set up talk about weight    She eats 4 or more servings of fruits and vegetables daily.She consumes 0 sweetened beverage(s) daily.She exercises with enough effort to increase her heart rate 20 to 29 minutes per day.  She exercises with enough effort to increase her heart rate 7 days per week.   She is taking medications regularly.    Steadily gaining weight  Thinks it's related to medication  Psych medication  Eats healthy and has an active job  #180     Review of Systems   See HPI       Objective    Vitals - Patient Reported  Weight (Patient Reported): 82.1 kg (181 lb)  Height (Patient Reported): 160 cm (5' 3\")  BMI (Based on Pt Reported Ht/Wt): 32.06  Vitals:  No vitals were obtained today due to virtual visit.    Physical Exam   GENERAL: Healthy, alert and no distress  EYES: Eyes grossly normal to inspection.  No discharge or erythema, or obvious scleral/conjunctival abnormalities.  RESP: No audible wheeze, cough, or visible cyanosis.  No visible retractions or increased work of breathing.    SKIN: Visible skin clear. No " significant rash, abnormal pigmentation or lesions.  NEURO: Cranial nerves grossly intact.  Mentation and speech appropriate for age.  PSYCH: Mentation appears normal, affect normal/bright, judgement and insight intact, normal speech and appearance well-groomed.      Video-Visit Details  Type of service:  Video Visit   Video Start Time:  7:00 AM  Video End Time:7:28 AM    Originating Location (pt. Location): Home    Distant Location (provider location):  On-site  Platform used for Video Visit: Ana

## 2023-11-08 NOTE — PATIENT INSTRUCTIONS
It was nice to meet you today Madelyn    Please complete Pulmonary Function tests. I have updated the order to try and confirm a diagnosis for this. This adds a component called DLCO.     If Wegovy gets approved and is affordable and available, then start this and MyChart me for further refills. Remember you cannot take this while pregnant or attempting to conceive.     Complete lab work, flu shot at your convenience.     After the new Pulmonary function tests are done, then we may complete a CT scan of your chest depending on the results.

## 2023-11-09 ENCOUNTER — LAB (OUTPATIENT)
Dept: LAB | Facility: CLINIC | Age: 28
End: 2023-11-09
Payer: COMMERCIAL

## 2023-11-09 ENCOUNTER — ALLIED HEALTH/NURSE VISIT (OUTPATIENT)
Dept: FAMILY MEDICINE | Facility: CLINIC | Age: 28
End: 2023-11-09
Payer: COMMERCIAL

## 2023-11-09 DIAGNOSIS — E66.09 CLASS 1 OBESITY DUE TO EXCESS CALORIES WITH SERIOUS COMORBIDITY AND BODY MASS INDEX (BMI) OF 32.0 TO 32.9 IN ADULT: ICD-10-CM

## 2023-11-09 DIAGNOSIS — E66.811 CLASS 1 OBESITY DUE TO EXCESS CALORIES WITH SERIOUS COMORBIDITY AND BODY MASS INDEX (BMI) OF 32.0 TO 32.9 IN ADULT: ICD-10-CM

## 2023-11-09 DIAGNOSIS — Z23 ENCOUNTER FOR IMMUNIZATION: Primary | ICD-10-CM

## 2023-11-09 DIAGNOSIS — J98.4 RESTRICTIVE LUNG DISEASE: ICD-10-CM

## 2023-11-09 LAB
ALBUMIN SERPL BCG-MCNC: 4.9 G/DL (ref 3.5–5.2)
ALP SERPL-CCNC: 76 U/L (ref 35–104)
ALT SERPL W P-5'-P-CCNC: 15 U/L (ref 0–50)
ANION GAP SERPL CALCULATED.3IONS-SCNC: 11 MMOL/L (ref 7–15)
AST SERPL W P-5'-P-CCNC: 22 U/L (ref 0–45)
BILIRUB SERPL-MCNC: 0.3 MG/DL
BUN SERPL-MCNC: 11.3 MG/DL (ref 6–20)
CALCIUM SERPL-MCNC: 9.9 MG/DL (ref 8.6–10)
CHLORIDE SERPL-SCNC: 100 MMOL/L (ref 98–107)
CHOLEST SERPL-MCNC: 188 MG/DL
CREAT SERPL-MCNC: 0.93 MG/DL (ref 0.51–0.95)
DEPRECATED HCO3 PLAS-SCNC: 26 MMOL/L (ref 22–29)
EGFRCR SERPLBLD CKD-EPI 2021: 86 ML/MIN/1.73M2
ERYTHROCYTE [DISTWIDTH] IN BLOOD BY AUTOMATED COUNT: 12.9 % (ref 10–15)
GLUCOSE SERPL-MCNC: 107 MG/DL (ref 70–99)
HBA1C MFR BLD: 5.1 % (ref 0–5.6)
HCT VFR BLD AUTO: 41 % (ref 35–47)
HDLC SERPL-MCNC: 92 MG/DL
HGB BLD-MCNC: 13.8 G/DL (ref 11.7–15.7)
LDLC SERPL CALC-MCNC: 81 MG/DL
MCH RBC QN AUTO: 30.9 PG (ref 26.5–33)
MCHC RBC AUTO-ENTMCNC: 33.7 G/DL (ref 31.5–36.5)
MCV RBC AUTO: 92 FL (ref 78–100)
NONHDLC SERPL-MCNC: 96 MG/DL
PLATELET # BLD AUTO: 297 10E3/UL (ref 150–450)
POTASSIUM SERPL-SCNC: 3.6 MMOL/L (ref 3.4–5.3)
PROT SERPL-MCNC: 8.2 G/DL (ref 6.4–8.3)
RBC # BLD AUTO: 4.47 10E6/UL (ref 3.8–5.2)
SODIUM SERPL-SCNC: 137 MMOL/L (ref 135–145)
TRIGL SERPL-MCNC: 77 MG/DL
TSH SERPL DL<=0.005 MIU/L-ACNC: 1.32 UIU/ML (ref 0.3–4.2)
WBC # BLD AUTO: 9.1 10E3/UL (ref 4–11)

## 2023-11-09 PROCEDURE — 90686 IIV4 VACC NO PRSV 0.5 ML IM: CPT

## 2023-11-09 PROCEDURE — 99207 PR NO CHARGE NURSE ONLY: CPT

## 2023-11-09 PROCEDURE — 36415 COLL VENOUS BLD VENIPUNCTURE: CPT

## 2023-11-09 PROCEDURE — 83036 HEMOGLOBIN GLYCOSYLATED A1C: CPT

## 2023-11-09 PROCEDURE — 84443 ASSAY THYROID STIM HORMONE: CPT

## 2023-11-09 PROCEDURE — 80053 COMPREHEN METABOLIC PANEL: CPT

## 2023-11-09 PROCEDURE — 80061 LIPID PANEL: CPT

## 2023-11-09 PROCEDURE — 90471 IMMUNIZATION ADMIN: CPT

## 2023-11-09 PROCEDURE — 85027 COMPLETE CBC AUTOMATED: CPT

## 2023-11-14 ENCOUNTER — HOSPITAL ENCOUNTER (OUTPATIENT)
Dept: RESPIRATORY THERAPY | Facility: CLINIC | Age: 28
Discharge: HOME | End: 2023-11-14
Attending: PHYSICIAN ASSISTANT | Admitting: PHYSICIAN ASSISTANT
Payer: COMMERCIAL

## 2023-11-14 ENCOUNTER — VIRTUAL VISIT (OUTPATIENT)
Dept: FAMILY MEDICINE | Facility: CLINIC | Age: 28
End: 2023-11-14
Payer: COMMERCIAL

## 2023-11-14 DIAGNOSIS — J45.20 MILD INTERMITTENT REACTIVE AIRWAY DISEASE WITHOUT COMPLICATION: ICD-10-CM

## 2023-11-14 DIAGNOSIS — F33.1 MODERATE EPISODE OF RECURRENT MAJOR DEPRESSIVE DISORDER (H): ICD-10-CM

## 2023-11-14 DIAGNOSIS — E66.09 CLASS 1 OBESITY DUE TO EXCESS CALORIES WITH SERIOUS COMORBIDITY AND BODY MASS INDEX (BMI) OF 32.0 TO 32.9 IN ADULT: Primary | ICD-10-CM

## 2023-11-14 DIAGNOSIS — J98.4 RESTRICTIVE LUNG DISEASE: ICD-10-CM

## 2023-11-14 DIAGNOSIS — E66.811 CLASS 1 OBESITY DUE TO EXCESS CALORIES WITH SERIOUS COMORBIDITY AND BODY MASS INDEX (BMI) OF 32.0 TO 32.9 IN ADULT: Primary | ICD-10-CM

## 2023-11-14 PROCEDURE — 94375 RESPIRATORY FLOW VOLUME LOOP: CPT

## 2023-11-14 PROCEDURE — 94729 DIFFUSING CAPACITY: CPT

## 2023-11-14 PROCEDURE — 99214 OFFICE O/P EST MOD 30 MIN: CPT | Mod: VID | Performed by: NURSE PRACTITIONER

## 2023-11-14 PROCEDURE — 94726 PLETHYSMOGRAPHY LUNG VOLUMES: CPT

## 2023-11-14 NOTE — PROGRESS NOTES
"Madelyn is a 28 year old who is being evaluated via a billable video visit.      How would you like to obtain your AVS? MyChart  If the video visit is dropped, the invitation should be resent by: Text to cell phone: [phone]  Will anyone else be joining your video visit? No          Assessment & Plan     Class 1 obesity due to excess calories with serious comorbidity and body mass index (BMI) of 32.0 to 32.9 in adult  Moderate episode of recurrent major depressive disorder (H)  Patient has struggled with weight gain and losing weight over the last few years.  Has trialed multiple lifestyle changes including increasing exercise, improving diet and is currently and has been in mental health counseling.  Weight significantly affects mental health.  Would benefit from weight loss management.  Wegovy previously prescribed, however PA denied due to needing multiple visits.  This seems to be the better option for patient to help with weight loss.  Was also referred to [facility] by psychiatry.  Will send through a new prescription for Wegovy and have the last 2 visits along with patient's uploaded scan of psychiatry visit for PA.  If starts medication, patient to follow-up in 3 months.  If it is not approved, I did discuss alternate options as per HPI.  - Semaglutide-Weight Management (WEGOVY) 0.25 MG/0.5ML pen; Inject 0.25 mg Subcutaneous once a week             BMI:   Estimated body mass index is 31.96 kg/m  as calculated from the following:    Height as of 8/11/23: 1.594 m (5' 2.75\").    Weight as of 8/11/23: 81.2 kg (179 lb).   Weight management plan: Discussed healthy diet and exercise guidelines Refer to Assessment     See Patient Instructions    Honey Delgado, DNP, APRN-CNP   [facility]    Subjective   Madelyn is a 28 year old, presenting for the following health issues:  Weight Loss      11/14/2023    10:28 AM   Additional Questions   Roomed by spencer wells cma       HPI     Pt " would like to discuss weight loss. She currently weights 181lb. She has not started the Wegovy yet- due to insurance not covering. Ins needs her to have 2 or more visits before covering. She was once seen through [facility], and Psych with [facility] ~after visit summary uploaded via Downtyme  Needs medication resent through with PA    Did also discuss options such as phentermine and Saxenda injections.  If Wegovy is not covered, patient would like to stick with injections even if daily versus an oral medication.      Wt Readings from Last 3 Encounters:   08/11/23 81.2 kg (179 lb)   08/10/23 81.6 kg (180 lb)   08/07/23 80.5 kg (177 lb 6.4 oz)   08/06/21  165 lb    Review of Systems   Constitutional, HEENT, cardiovascular, pulmonary, gi and gu systems are negative, except as otherwise noted.      Objective           Vitals:  No vitals were obtained today due to virtual visit.    Physical Exam   GENERAL: Healthy, alert and no distress  EYES: Eyes grossly normal to inspection.  No discharge or erythema, or obvious scleral/conjunctival abnormalities.  RESP: No audible wheeze, cough, or visible cyanosis.  No visible retractions or increased work of breathing.    SKIN: Visible skin clear. No significant rash, abnormal pigmentation or lesions.  NEURO: Cranial nerves grossly intact.  Mentation and speech appropriate for age.  PSYCH: Mentation appears normal, affect normal/bright, judgement and insight intact, normal speech and appearance well-groomed.    Diagnostic Test Results:  Labs reviewed in Epic  none            Video-Visit Details    Type of service:  Video Visit   Video Start Time: 11:22 AM  Video End Time:11:41 AM    Originating Location (pt. Location): Home    Distant Location (provider location):  Off-site  Platform used for Video Visit: LIQUITY    Chart documentation with Dragon Voice recognition Software. Although reviewed after completion, some words and grammatical errors may remain.

## 2023-11-14 NOTE — NURSING NOTE
"Chief Complaint   Patient presents with    Weight Loss       Initial LMP 10/30/2023  Estimated body mass index is 31.96 kg/m  as calculated from the following:    Height as of 8/11/23: 1.594 m (5' 2.75\").    Weight as of 8/11/23: 81.2 kg (179 lb).    Patient presents to the clinic using No DME    Is there anyone who you would like to be able to receive your results? No  If yes have patient fill out LORETTA      "

## 2023-11-14 NOTE — PATIENT INSTRUCTIONS
Class 1 obesity due to excess calories with serious comorbidity and body mass index (BMI) of 32.0 to 32.9 in adult  Moderate episode of recurrent major depressive disorder (H)  Patient has struggled with weight gain and losing weight over the last few years.  Has trialed multiple lifestyle changes including increasing exercise, improving diet and is currently and has been in mental health counseling.  Weight significantly affects mental health.  Would benefit from weight loss management.  Wegovy previously prescribed, however PA denied due to needing multiple visits.  This seems to be the better option for patient to help with weight loss.  Was also referred to [facility] by psychiatry.  Will send through a new prescription for Wegovy and have the last 2 visits along with patient's uploaded scan of psychiatry visit for PA.  If starts medication, patient to follow-up in 3 months.  If it is not approved, I did discuss alternate options as per HPI.  - Semaglutide-Weight Management (WEGOVY) 0.25 MG/0.5ML pen; Inject 0.25 mg Subcutaneous once a week

## 2023-11-14 NOTE — Clinical Note
Previous PA denied. Needed office visits and documentation. Please attached this office visit, along with previous visit from 11/8/2023 as well as patient's uploaded attachment in media section of AVS from other provider.   Thanks, Honey Delgado DNP, APRN-CNP

## 2023-11-15 ENCOUNTER — MYC MEDICAL ADVICE (OUTPATIENT)
Dept: FAMILY MEDICINE | Facility: CLINIC | Age: 28
End: 2023-11-15
Payer: COMMERCIAL

## 2023-11-15 DIAGNOSIS — E66.811 CLASS 1 OBESITY DUE TO EXCESS CALORIES WITH SERIOUS COMORBIDITY AND BODY MASS INDEX (BMI) OF 32.0 TO 32.9 IN ADULT: Primary | ICD-10-CM

## 2023-11-15 DIAGNOSIS — E66.09 CLASS 1 OBESITY DUE TO EXCESS CALORIES WITH SERIOUS COMORBIDITY AND BODY MASS INDEX (BMI) OF 32.0 TO 32.9 IN ADULT: Primary | ICD-10-CM

## 2023-11-15 LAB
DLCOCOR-%PRED-PRE: 77 %
DLCOCOR-PRE: 16.67 ML/MIN/MMHG
DLCOUNC-%PRED-PRE: 78 %
DLCOUNC-PRE: 16.87 ML/MIN/MMHG
DLCOUNC-PRED: 21.44 ML/MIN/MMHG
ERV-%PRED-PRE: 46 %
ERV-PRE: 0.56 L
ERV-PRED: 1.2 L
EXPTIME-PRE: 3.44 SEC
FEF2575-%PRED-PRE: 91 %
FEF2575-PRE: 3.1 L/SEC
FEF2575-PRED: 3.4 L/SEC
FEFMAX-%PRED-PRE: 67 %
FEFMAX-PRE: 4.58 L/SEC
FEFMAX-PRED: 6.78 L/SEC
FEV1-%PRED-PRE: 86 %
FEV1-PRE: 2.52 L
FEV1FEV6-PRE: 88 %
FEV1FEV6-PRED: 86 %
FEV1FVC-PRE: 88 %
FEV1FVC-PRED: 87 %
FEV1SVC-PRE: 95 %
FEV1SVC-PRED: 81 %
FIFMAX-PRE: 1.7 L/SEC
FRCPLETH-%PRED-PRE: 68 %
FRCPLETH-PRE: 1.78 L
FRCPLETH-PRED: 2.6 L
FVC-%PRED-PRE: 85 %
FVC-PRE: 2.85 L
FVC-PRED: 3.35 L
IC-%PRED-PRE: 87 %
IC-PRE: 2.09 L
IC-PRED: 2.4 L
RVPLETH-%PRED-PRE: 92 %
RVPLETH-PRE: 1.22 L
RVPLETH-PRED: 1.32 L
TLCPLETH-%PRED-PRE: 81 %
TLCPLETH-PRE: 3.87 L
TLCPLETH-PRED: 4.73 L
VA-%PRED-PRE: 76 %
VA-PRE: 3.5 L
VC-%PRED-PRE: 74 %
VC-PRE: 2.65 L
VC-PRED: 3.56 L

## 2023-11-15 NOTE — TELEPHONE ENCOUNTER
Medication Appeal Initiation    Medication: SEMAGLUTIDE-WEIGHT MANAGEMENT 0.25 MG/0.5ML SC SOAJ  Appeal Start Date:  11/15/2023  Insurance Company: [employer] - Phone [phone] Fax [phone]   Insurance Phone:   Insurance Fax: [phone]  Comments:

## 2023-11-15 NOTE — TELEPHONE ENCOUNTER
MEDICATION APPEAL APPROVED    Medication: SEMAGLUTIDE-WEIGHT MANAGEMENT 0.25 MG/0.5ML SC SOAJ  Authorization Effective Date: 10/15/2023  Authorization Expiration Date: 11/15/2024  Approved Dose/Quantity:   Reference #: [id number]   Appeal Insurance Company: [employer] - Phone [phone] Fax [phone]   Expected CoPay: $       CoPay Card Available:    Financial Assistance Needed:   Filling Pharmacy:  [facility] [place], [place] - [address] [place] [address] [[id number]]     Comments:

## 2023-12-01 ENCOUNTER — TELEPHONE (OUTPATIENT)
Dept: FAMILY MEDICINE | Facility: CLINIC | Age: 28
End: 2023-12-01
Payer: COMMERCIAL

## 2023-12-01 DIAGNOSIS — E66.09 CLASS 1 OBESITY DUE TO EXCESS CALORIES WITH SERIOUS COMORBIDITY AND BODY MASS INDEX (BMI) OF 32.0 TO 32.9 IN ADULT: ICD-10-CM

## 2023-12-01 DIAGNOSIS — E66.811 CLASS 1 OBESITY DUE TO EXCESS CALORIES WITH SERIOUS COMORBIDITY AND BODY MASS INDEX (BMI) OF 32.0 TO 32.9 IN ADULT: ICD-10-CM

## 2023-12-06 NOTE — TELEPHONE ENCOUNTER
Central Prior Authorization Team  Phone: [phone]    PA Initiation    Medication: SAXENDA 18 MG/3ML SC SOPN  Insurance Company: [employer] - Phone [phone] Fax [phone]  Pharmacy Filling the Rx: [facility] [place], [place] - [address]  Filling Pharmacy Phone: [phone]  Filling Pharmacy Fax:    Start Date: 12/5/2023

## 2023-12-06 NOTE — TELEPHONE ENCOUNTER
Central Prior Authorization Team  Phone: [phone]    Prior Authorization Approval    Medication: SAXENDA 18 MG/3ML SC SOPN  Authorization Effective Date: 11/6/2023  Authorization Expiration Date: 12/6/2024  Approved Dose/Quantity:   Reference #:     Insurance Company: [employer] - Phone [phone] Fax [phone]  Expected CoPay: $    CoPay Card Available:      Financial Assistance Needed:   Which Pharmacy is filling the prescription: [facility] [place], [place] - [address] [place] [address]  Pharmacy Notified: yes  Patient Notified: PHARMACY WILL NOTIFY PT WHEN READY

## 2023-12-18 ENCOUNTER — VIRTUAL VISIT (OUTPATIENT)
Dept: FAMILY MEDICINE | Facility: CLINIC | Age: 28
End: 2023-12-18
Payer: COMMERCIAL

## 2023-12-18 DIAGNOSIS — J02.9 SORE THROAT: Primary | ICD-10-CM

## 2023-12-18 DIAGNOSIS — R51.9 ACHING HEADACHE: ICD-10-CM

## 2023-12-18 DIAGNOSIS — R52 GENERALIZED BODY ACHES: ICD-10-CM

## 2023-12-18 PROCEDURE — 99213 OFFICE O/P EST LOW 20 MIN: CPT | Mod: VID | Performed by: NURSE PRACTITIONER

## 2023-12-18 ASSESSMENT — ENCOUNTER SYMPTOMS
HEADACHES: 1
BACK PAIN: 1
SORE THROAT: 1

## 2023-12-18 ASSESSMENT — COLUMBIA-SUICIDE SEVERITY RATING SCALE - C-SSRS
2. IN THE PAST MONTH, HAVE YOU ACTUALLY HAD ANY THOUGHTS OF KILLING YOURSELF?: NO
6. HAVE YOU EVER DONE ANYTHING, STARTED TO DO ANYTHING, OR PREPARED TO DO ANYTHING TO END YOUR LIFE?: NO
1. WITHIN THE PAST MONTH, HAVE YOU WISHED YOU WERE DEAD OR WISHED YOU COULD GO TO SLEEP AND NOT WAKE UP?: NO

## 2023-12-18 ASSESSMENT — ASTHMA QUESTIONNAIRES: ACT_TOTALSCORE: 16

## 2023-12-18 NOTE — PROGRESS NOTES
Madelyn is a 28 year old who is being evaluated via a billable video visit.      How would you like to obtain your AVS? [place]  If the video visit is dropped, the invitation should be resent by: Text to cell phone: [phone]  Will anyone else be joining your video visit? No          Assessment & Plan     Sore throat    - Streptococcus A Rapid Screen w/Reflex to PCR - Clinic Collect    Aching headache      Generalized body aches      - covid home test was done during our visit and it was negative.  Has sore throat symptoms, PCR ordered. I suggested she get this test completed today. If positive, she will call care-line for antibiotic (can use protocol) or call on-call MD. If negative, viral etiology was discussed.   - patient requesting work-note. 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   - Phq9 is positive. She declined any intervention from me, but is seen by psychiatry. Safety plan discussed.            Depression Screening Follow Up        12/18/2023     3:40 PM   PHQ   PHQ-9 Total Score 12   Q9: Thoughts of better off dead/self-harm past 2 weeks Several days   F/U: Thoughts of suicide or self-harm No   F/U: Safety concerns No         12/18/2023     3:40 PM   Last PHQ-9   1.  Little interest or pleasure in doing things 1   2.  Feeling down, depressed, or hopeless 1   3.  Trouble falling or staying asleep, or sleeping too much 2   4.  Feeling tired or having little energy 2   5.  Poor appetite or overeating 2   6.  Feeling bad about yourself 1   7.  Trouble concentrating 2   8.  Moving slowly or restless 0   Q9: Thoughts of better off dead/self-harm past 2 weeks 1   PHQ-9 Total Score 12   In the past two weeks have you had thoughts of suicide or self harm? No   Do you have concerns about your personal safety or the safety of others? No             12/18/2023     5:25 PM   C-SSRS (Brief [place])   Within the last month, have you wished you were dead or wished you could go to sleep and not wake up? No   Within the  last month, have you had any actual thoughts of killing yourself? No   Within the last month, have you ever done anything, started to do anything, or prepared to do anything to end your life? No         Follow Up      Follow Up Actions Taken  Crisis resource information provided in the After Visit Summary  Referred patient back to mental health provider  Patient declined referral.    Discussed the following ways the patient can remain in a safe environment:  remove alcohol, remove drugs, dispose of old medications , and be around others      NATHANAEL Cardozo [facility]    Kyra Joshi is a 28 year old, presenting for the following health issues:  Headache, Musculoskeletal Problem, Back Pain, and Pharyngitis (Started 12/18/2023)      12/18/2023     3:49 PM   Additional Questions   Roomed by Angus     - sore throat, headache, and back aches. Started today. Works at a fast food restaurant. No ear pain. Hurts when she swallows. No cough. No fever that she is aware of. Ear zehra hurts. She is noticing white spots.     Headache   The current episode started yesterday. The problem occurs constantly. The pain is at a severity of 3/10. The pain is mild.   Musculoskeletal Problem  This is a new problem. The current episode started today. The problem occurs constantly. Associated symptoms include headaches and a sore throat.   Back Pain   This is a new problem. The current episode started 12 to 24 hours ago. The problem occurs constantly. The problem has not changed since onset.Associated symptoms include headaches.   Pharyngitis   This is a new problem. The current episode started 12 to 24 hours ago. The problem has not changed since onset.Associated symptoms include headaches.   History of Present Illness       Back Pain:  She presents for follow up of back pain. Patient's back pain is a new problem.    Original cause of back pain: not sure  First noticed back pain:  "today  Patient feels back pain: constantlyLocation of back pain:  Other  Description of back pain: gnawing  Back pain spreads: nowhere    Since patient first noticed back pain, pain is: unchanged  Does back pain interfere with her job:  Not applicable  On a scale of 1-10 (10 being the worst), patient describes pain as:  4  What makes back pain worse: bending, standing and twisting   Acupuncture: not tried  Acetaminophen: not helpful  Activity or exercise: not tried  Chiropractor:  Helpful  Cold: helpful  Heat: not helpful  Massage: not helpful  Muscle relaxants: not tried  NSAIDS: helpful  Opioids: not tried  Physical Therapy: not tried  Rest: not tried  Steroid Injection: not tried  Stretching: not helpful  Surgery: not tried  TENS unit: not tried  Topical pain relievers: not tried  Other healthcare providers patient is seeing for back pain: Chiropractor    Reason for visit:  I feel sick  Symptom onset:  Today  Symptoms include:  It started with just back pain then became headache and body pain then sore throat  Symptom intensity:  Moderate  Symptom progression:  Worsening  Had these symptoms before:  No    She eats 2-3 servings of fruits and vegetables daily.She consumes 0 sweetened beverage(s) daily.She exercises with enough effort to increase her heart rate 20 to 29 minutes per day.  She exercises with enough effort to increase her heart rate 5 days per week.   She is taking medications regularly.               Review of Systems   HENT:  Positive for sore throat.    Musculoskeletal:  Positive for back pain.   Neurological:  Positive for headaches.            Objective    Vitals - Patient Reported  Weight (Patient Reported): 77.1 kg (170 lb)  Height (Patient Reported): 160 cm (5' 3\")  BMI (Based on Pt Reported Ht/Wt): 30.11  Pain Score: Mild Pain (3)  Pain Loc: Mid Back        Physical Exam   GENERAL: Healthy, alert and no distress  EYES: Eyes grossly normal to inspection.  No discharge or erythema, or obvious " scleral/conjunctival abnormalities.  HENT: Normal cephalic/atraumatic.  External ears, nose and mouth without ulcers or lesions.  No nasal drainage visible.- was able to visualize back of throat on video. Tonsils were not seen.   RESP: No audible wheeze, cough, or visible cyanosis.  No visible retractions or increased work of breathing.    SKIN: Visible skin clear. No significant rash, abnormal pigmentation or lesions.  NEURO: Cranial nerves grossly intact.  Mentation and speech appropriate for age.  PSYCH: Mentation appears normal, affect normal/bright, judgement and insight intact, normal speech and appearance well-groomed.    Hospital Outpatient Visit on 11/14/2023   Component Date Value Ref Range Status    FVC-Pred 11/14/2023 3.35  L Final    FVC-Pre 11/14/2023 2.85  L Final    FVC-%Pred-Pre 11/14/2023 85  % Final    FEV1-Pre 11/14/2023 2.52  L Final    FEV1-%Pred-Pre 11/14/2023 86  % Final    FEV1FVC-Pred 11/14/2023 87  % Final    FEV1FVC-Pre 11/14/2023 88  % Final    FEFMax-Pred 11/14/2023 6.78  L/sec Final    FEFMax-Pre 11/14/2023 4.58  L/sec Final    FEFMax-%Pred-Pre 11/14/2023 67  % Final    FEF2575-Pred 11/14/2023 3.40  L/sec Final    FEF2575-Pre 11/14/2023 3.10  L/sec Final    QXB5853-%Pred-Pre 11/14/2023 91  % Final    ExpTime-Pre 11/14/2023 3.44  sec Final    FIFMax-Pre 11/14/2023 1.70  L/sec Final    VC-Pred 11/14/2023 3.56  L Final    VC-Pre 11/14/2023 2.65  L Final    VC-%Pred-Pre 11/14/2023 74  % Final    IC-Pred 11/14/2023 2.40  L Final    IC-Pre 11/14/2023 2.09  L Final    IC-%Pred-Pre 11/14/2023 87  % Final    ERV-Pred 11/14/2023 1.20  L Final    ERV-Pre 11/14/2023 0.56  L Final    ERV-%Pred-Pre 11/14/2023 46  % Final    FEV1FEV6-Pred 11/14/2023 86  % Final    FEV1FEV6-Pre 11/14/2023 88  % Final    FRCPleth-Pred 11/14/2023 2.60  L Final    FRCPleth-Pre 11/14/2023 1.78  L Final    FRCPleth-%Pred-Pre 11/14/2023 68  % Final    RVPleth-Pred 11/14/2023 1.32  L Final    RVPleth-Pre 11/14/2023 1.22  L  Final    RVPleth-%Pred-Pre 11/14/2023 92  % Final    TLCPleth-Pred 11/14/2023 4.73  L Final    TLCPleth-Pre 11/14/2023 3.87  L Final    TLCPleth-%Pred-Pre 11/14/2023 81  % Final    DLCOunc-Pred 11/14/2023 21.44  ml/min/mmHg Final    DLCOunc-Pre 11/14/2023 16.87  ml/min/mmHg Final    DLCOunc-%Pred-Pre 11/14/2023 78  % Final    DLCOcor-Pre 11/14/2023 16.67  ml/min/mmHg Final    DLCOcor-%Pred-Pre 11/14/2023 77  % Final    VA-Pre 11/14/2023 3.50  L Final    VA-%Pred-Pre 11/14/2023 76  % Final    FEV1SVC-Pred 11/14/2023 81  % Final    FEV1SVC-Pre 11/14/2023 95  % Final               Video-Visit Details    Type of service:  Video Visit       Originating Location (pt. Location): Home    Distant Location (provider location):  On-site  Platform used for Video Visit: Ana

## 2023-12-18 NOTE — LETTER
December 18, 2023      Madelyn Quintana  [address]   [place] [address]        To Whom It May Concern:    Madelyn Quintana  was seen on 12-.  Please excuse her from work 12- due to illness.        Sincerely,        NATHANAEL Cardozo CNP

## 2023-12-19 ENCOUNTER — LAB (OUTPATIENT)
Dept: FAMILY MEDICINE | Facility: CLINIC | Age: 28
End: 2023-12-19
Attending: NURSE PRACTITIONER
Payer: COMMERCIAL

## 2023-12-19 DIAGNOSIS — J02.9 SORE THROAT: Primary | ICD-10-CM

## 2023-12-19 LAB
DEPRECATED S PYO AG THROAT QL EIA: NEGATIVE
GROUP A STREP BY PCR: NOT DETECTED

## 2023-12-19 PROCEDURE — 87651 STREP A DNA AMP PROBE: CPT | Mod: VID | Performed by: NURSE PRACTITIONER

## 2023-12-19 PROCEDURE — 99207 PR NO CHARGE NURSE ONLY: CPT

## 2023-12-23 ENCOUNTER — ANCILLARY ORDERS (OUTPATIENT)
Dept: FAMILY MEDICINE | Facility: CLINIC | Age: 28
End: 2023-12-23

## 2023-12-23 ENCOUNTER — HOSPITAL ENCOUNTER (OUTPATIENT)
Dept: CT IMAGING | Facility: CLINIC | Age: 28
Discharge: HOME | End: 2023-12-23
Attending: PHYSICIAN ASSISTANT | Admitting: PHYSICIAN ASSISTANT
Payer: COMMERCIAL

## 2023-12-23 DIAGNOSIS — R94.2 ABNORMAL PFTS: ICD-10-CM

## 2023-12-23 DIAGNOSIS — R06.02 SHORTNESS OF BREATH: ICD-10-CM

## 2023-12-23 DIAGNOSIS — R06.02 SHORTNESS OF BREATH: Primary | ICD-10-CM

## 2023-12-23 PROCEDURE — 71250 CT THORAX DX C-: CPT

## 2024-01-01 ENCOUNTER — HOSPITAL ENCOUNTER (EMERGENCY)
Facility: CLINIC | Age: 29
Discharge: HOME | End: 2024-01-01
Attending: EMERGENCY MEDICINE | Admitting: EMERGENCY MEDICINE
Payer: COMMERCIAL

## 2024-01-01 VITALS
DIASTOLIC BLOOD PRESSURE: 67 MMHG | BODY MASS INDEX: 29.02 KG/M2 | HEIGHT: 64 IN | TEMPERATURE: 98.4 F | WEIGHT: 170 LBS | SYSTOLIC BLOOD PRESSURE: 123 MMHG | HEART RATE: 89 BPM | OXYGEN SATURATION: 98 % | RESPIRATION RATE: 54 BRPM

## 2024-01-01 DIAGNOSIS — U07.1 COVID-19: ICD-10-CM

## 2024-01-01 LAB
ALBUMIN SERPL BCG-MCNC: 4.3 G/DL (ref 3.5–5.2)
ALBUMIN UR-MCNC: NEGATIVE MG/DL
ALP SERPL-CCNC: 65 U/L (ref 40–150)
ALT SERPL W P-5'-P-CCNC: 21 U/L (ref 0–50)
AMPHETAMINES UR QL SCN: ABNORMAL
ANION GAP SERPL CALCULATED.3IONS-SCNC: 11 MMOL/L (ref 7–15)
APPEARANCE UR: CLEAR
AST SERPL W P-5'-P-CCNC: 23 U/L (ref 0–45)
BACTERIA #/AREA URNS HPF: ABNORMAL /HPF
BARBITURATES UR QL SCN: ABNORMAL
BENZODIAZ UR QL SCN: ABNORMAL
BILIRUB SERPL-MCNC: 0.4 MG/DL
BILIRUB UR QL STRIP: NEGATIVE
BUN SERPL-MCNC: 9.6 MG/DL (ref 6–20)
BZE UR QL SCN: ABNORMAL
CALCIUM SERPL-MCNC: 9.5 MG/DL (ref 8.6–10)
CANNABINOIDS UR QL SCN: ABNORMAL
CHLORIDE SERPL-SCNC: 100 MMOL/L (ref 98–107)
COLOR UR AUTO: YELLOW
CREAT SERPL-MCNC: 0.76 MG/DL (ref 0.51–0.95)
DEPRECATED HCO3 PLAS-SCNC: 23 MMOL/L (ref 22–29)
EGFRCR SERPLBLD CKD-EPI 2021: >90 ML/MIN/1.73M2
ERYTHROCYTE [DISTWIDTH] IN BLOOD BY AUTOMATED COUNT: 11.9 % (ref 10–15)
FENTANYL UR QL: ABNORMAL
FLUAV RNA SPEC QL NAA+PROBE: NEGATIVE
FLUBV RNA RESP QL NAA+PROBE: NEGATIVE
GLUCOSE BLDC GLUCOMTR-MCNC: 99 MG/DL (ref 70–99)
GLUCOSE SERPL-MCNC: 87 MG/DL (ref 70–99)
GLUCOSE UR STRIP-MCNC: NEGATIVE MG/DL
GROUP A STREP BY PCR: NOT DETECTED
HCG UR QL: NEGATIVE
HCT VFR BLD AUTO: 40.6 % (ref 35–47)
HGB BLD-MCNC: 14.1 G/DL (ref 11.7–15.7)
HGB UR QL STRIP: NEGATIVE
KETONES UR STRIP-MCNC: 20 MG/DL
LEUKOCYTE ESTERASE UR QL STRIP: NEGATIVE
MAGNESIUM SERPL-MCNC: 1.6 MG/DL (ref 1.7–2.3)
MCH RBC QN AUTO: 31.3 PG (ref 26.5–33)
MCHC RBC AUTO-ENTMCNC: 34.7 G/DL (ref 31.5–36.5)
MCV RBC AUTO: 90 FL (ref 78–100)
MUCOUS THREADS #/AREA URNS LPF: PRESENT /LPF
NITRATE UR QL: NEGATIVE
OPIATES UR QL SCN: ABNORMAL
PCP QUAL URINE (ROCHE): ABNORMAL
PH UR STRIP: 5 [PH] (ref 5–7)
PLATELET # BLD AUTO: 263 10E3/UL (ref 150–450)
POTASSIUM SERPL-SCNC: 3.5 MMOL/L (ref 3.4–5.3)
PROT SERPL-MCNC: 7.5 G/DL (ref 6.4–8.3)
RBC # BLD AUTO: 4.51 10E6/UL (ref 3.8–5.2)
RBC URINE: <1 /HPF
RSV RNA SPEC NAA+PROBE: NEGATIVE
SARS-COV-2 RNA RESP QL NAA+PROBE: POSITIVE
SODIUM SERPL-SCNC: 134 MMOL/L (ref 135–145)
SP GR UR STRIP: 1.02 (ref 1–1.03)
SQUAMOUS EPITHELIAL: 1 /HPF
TSH SERPL DL<=0.005 MIU/L-ACNC: 0.71 UIU/ML (ref 0.3–4.2)
UROBILINOGEN UR STRIP-MCNC: NORMAL MG/DL
WBC # BLD AUTO: 13.3 10E3/UL (ref 4–11)
WBC URINE: 1 /HPF

## 2024-01-01 PROCEDURE — 250N000011 HC RX IP 250 OP 636: Performed by: EMERGENCY MEDICINE

## 2024-01-01 PROCEDURE — 36415 COLL VENOUS BLD VENIPUNCTURE: CPT | Performed by: EMERGENCY MEDICINE

## 2024-01-01 PROCEDURE — 83735 ASSAY OF MAGNESIUM: CPT | Performed by: EMERGENCY MEDICINE

## 2024-01-01 PROCEDURE — 84443 ASSAY THYROID STIM HORMONE: CPT | Performed by: EMERGENCY MEDICINE

## 2024-01-01 PROCEDURE — 96361 HYDRATE IV INFUSION ADD-ON: CPT | Performed by: EMERGENCY MEDICINE

## 2024-01-01 PROCEDURE — 99284 EMERGENCY DEPT VISIT MOD MDM: CPT | Mod: 25 | Performed by: EMERGENCY MEDICINE

## 2024-01-01 PROCEDURE — 85027 COMPLETE CBC AUTOMATED: CPT | Performed by: EMERGENCY MEDICINE

## 2024-01-01 PROCEDURE — 96376 TX/PRO/DX INJ SAME DRUG ADON: CPT | Performed by: EMERGENCY MEDICINE

## 2024-01-01 PROCEDURE — 80053 COMPREHEN METABOLIC PANEL: CPT | Performed by: EMERGENCY MEDICINE

## 2024-01-01 PROCEDURE — 81001 URINALYSIS AUTO W/SCOPE: CPT | Mod: XU | Performed by: EMERGENCY MEDICINE

## 2024-01-01 PROCEDURE — 93010 ELECTROCARDIOGRAM REPORT: CPT | Performed by: EMERGENCY MEDICINE

## 2024-01-01 PROCEDURE — 96374 THER/PROPH/DIAG INJ IV PUSH: CPT | Performed by: EMERGENCY MEDICINE

## 2024-01-01 PROCEDURE — 81025 URINE PREGNANCY TEST: CPT | Performed by: EMERGENCY MEDICINE

## 2024-01-01 PROCEDURE — 80307 DRUG TEST PRSMV CHEM ANLYZR: CPT | Performed by: EMERGENCY MEDICINE

## 2024-01-01 PROCEDURE — 93005 ELECTROCARDIOGRAM TRACING: CPT | Performed by: EMERGENCY MEDICINE

## 2024-01-01 PROCEDURE — 87637 SARSCOV2&INF A&B&RSV AMP PRB: CPT | Performed by: EMERGENCY MEDICINE

## 2024-01-01 PROCEDURE — 258N000003 HC RX IP 258 OP 636: Performed by: EMERGENCY MEDICINE

## 2024-01-01 PROCEDURE — 87651 STREP A DNA AMP PROBE: CPT | Performed by: EMERGENCY MEDICINE

## 2024-01-01 PROCEDURE — 96375 TX/PRO/DX INJ NEW DRUG ADDON: CPT | Performed by: EMERGENCY MEDICINE

## 2024-01-01 PROCEDURE — 82962 GLUCOSE BLOOD TEST: CPT

## 2024-01-01 RX ORDER — DROPERIDOL 2.5 MG/ML
1.25 INJECTION, SOLUTION INTRAMUSCULAR; INTRAVENOUS ONCE
Status: COMPLETED | OUTPATIENT
Start: 2024-01-01 | End: 2024-01-01

## 2024-01-01 RX ORDER — KETOROLAC TROMETHAMINE 15 MG/ML
15 INJECTION, SOLUTION INTRAMUSCULAR; INTRAVENOUS ONCE
Status: COMPLETED | OUTPATIENT
Start: 2024-01-01 | End: 2024-01-01

## 2024-01-01 RX ADMIN — DROPERIDOL 1.25 MG: 2.5 INJECTION, SOLUTION INTRAMUSCULAR; INTRAVENOUS at 15:19

## 2024-01-01 RX ADMIN — SODIUM CHLORIDE, POTASSIUM CHLORIDE, SODIUM LACTATE AND CALCIUM CHLORIDE 1000 ML: 600; 310; 30; 20 INJECTION, SOLUTION INTRAVENOUS at 13:52

## 2024-01-01 RX ADMIN — KETOROLAC TROMETHAMINE 15 MG: 15 INJECTION, SOLUTION INTRAMUSCULAR; INTRAVENOUS at 13:47

## 2024-01-01 RX ADMIN — DROPERIDOL 1.25 MG: 2.5 INJECTION, SOLUTION INTRAMUSCULAR; INTRAVENOUS at 13:48

## 2024-01-01 ASSESSMENT — ACTIVITIES OF DAILY LIVING (ADL)
ADLS_ACUITY_SCORE: 37

## 2024-01-01 NOTE — DISCHARGE INSTRUCTIONS
You have COVID.  This probably is the cause of your symptoms.    Stay hydrated, and you can take 1000 milligrams of Tylenol and 800 milligrams of ibuprofen every 8 hours.  You can stagger these so that you are getting one of them every 4 hours.    Take care and feel better soon.    Happy new year.

## 2024-01-01 NOTE — ED NOTES
Pt has been having episodes of tingling in all 4 extremities and back of neck since this am, last a few seconds at a time. Has had a mild headache and lightheadedness that started around 10, gets worse when she stands up. Also states that vision is blurry. She hasn't eaten anything today. Symptoms get worse when she stands up

## 2024-01-01 NOTE — ED PROVIDER NOTES
History     Chief Complaint   Patient presents with    Dizziness     HPI  Madelyn Quintana is a 28 year old female who presents with dizziness. No vertigo, she says she feels lightheaded. This started today when she woke up.  Says she feels like all 4 of her extremities are tingling and it comes and goes.  She also has a numb sensation in the back of her neck that lasts a few seconds at a time and comes and goes.  She has a bit of a headache.  She says her vision is blurry when she stands up.  She has not eaten today.  Her symptoms get worse when she stands up. She is on Saxenda, a GLP-1 analog for weight loss.  She started this 3 weeks ago.  She is also on Zoloft, trazodone, atarax for anxiety, ativan PRN for anxiety and has had numerous medical encounters in the last 2 months, which I reviewed in her chart.  No fevers, no diarrhea, no vomiting.  She says she might of had migraines when she was younger.  No chest pain, no cough.  No sore throat. No vision changes, no photophobia, no neck stiffness.      Allergies:  Allergies   Allergen Reactions    Lactose Diarrhea     Intolerance  Other reaction(s): GI intolerance  Intolerance  Intolerance       Problem List:    Patient Active Problem List    Diagnosis Date Noted    Moderate episode of recurrent major depressive disorder (H) 11/08/2023     Priority: Medium    Eczema 11/08/2023     Priority: Medium    Insomnia 11/08/2023     Priority: Medium    Reactive airway disease 11/08/2023     Priority: Medium    ASCUS of cervix with negative high risk HPV 09/15/2021     Priority: Medium     2017 NIL Pap  4/23/21 ASCUS pap, Neg HPV. Plan cotest in 3 years. 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    Family History:    Family History   Problem Relation Age of Onset    Unknown/Adopted Paternal Grandmother     Unknown/Adopted Paternal Grandfather     Unknown/Adopted Maternal Grandfather        Social History:  Marital Status:  Single [1]  Social History     Tobacco Use    Smoking status: Former     Types: Vaping Device     Passive exposure: Past    Smokeless tobacco: Never    Tobacco comments:     5 cigarettes a day    Vaping Use    Vaping Use: Former    Substances: Nicotine, Flavoring    Devices: Disposable   Substance Use Topics    Alcohol use: Yes     Comment: social    Drug use: No        Medications:    albuterol (VENTOLIN HFA) 108 (90 Base) MCG/ACT inhaler  augmented betamethasone dipropionate (DIPROLENE AF) 0.05 % external cream  fluocinonide (LIDEX) 0.05 % external cream  hydrOXYzine (ATARAX) 25 MG tablet  hydrOXYzine (VISTARIL) 50 MG capsule  IBUPROFEN PO  letrozole (FEMARA) 2.5 MG tablet  liraglutide - Weight Management (SAXENDA) 18 MG/3ML pen  LORazepam (ATIVAN) 0.5 MG tablet  Prenatal Vit-Fe Fumarate-FA (PRENATAL MULTIVITAMIN W/IRON) 27-0.8 MG tablet  progesterone (PROMETRIUM) 200 MG capsule  Semaglutide-Weight Management (WEGOVY) 0.25 MG/0.5ML pen  sertraline (ZOLOFT) 100 MG tablet  traZODone (DESYREL) 100 MG tablet          Review of Systems    Physical Exam   BP: 111/68  Pulse: 108  Temp: 98.4  F (36.9  C)  Resp: 18  Height: 162.6 cm (5' 4\")  Weight: 77.1 kg (170 lb)  SpO2: 98 %  Lying Orthostatic BP: 121/70  Lying Orthostatic Pulse: 95 bpm  Sitting Orthostatic BP: 134/71  Sitting Orthostatic Pulse: 105 bpm  Standing Orthostatic BP: 123/77  Standing Orthostatic Pulse: 118 bpm      Physical Exam  Constitutional:       General: She is not " in acute distress.     Appearance: She is not ill-appearing or toxic-appearing.   HENT:      Head: Normocephalic.      Right Ear: External ear normal.      Left Ear: External ear normal.      Nose: Nose normal. No congestion.      Mouth/Throat:      Pharynx: No oropharyngeal exudate.   Eyes:      General:         Right eye: No discharge.         Left eye: No discharge.      Comments: No nystagmus,  PERRL, no photophobia   Neck:      Comments: No neck stiffness  Cardiovascular:      Comments: HR in 90s and low 100s in room   Pulmonary:      Effort: No respiratory distress.      Breath sounds: No wheezing.   Abdominal:      General: There is no distension.   Musculoskeletal:         General: No swelling.      Cervical back: No rigidity.   Skin:     Capillary Refill: Capillary refill takes less than 2 seconds.      Coloration: Skin is not jaundiced.      Findings: No bruising.   Neurological:      Mental Status: She is alert.      Comments: No facial droop  Speech normal  No rotary or vertical nystagmus  Strength 5/5 in upper and lower extremity b/l  No pronator drift  Able to cross midline with eyes closed  Sensation to light touch intact in upper and and lower extremity b/l and in V1/V2/V3 b/l   AAOx3  Normal gait           ED Course              ED Course as of 01/01/24 1812   Mon Jan 01, 2024   1419 Patient's white count is 13.3.  Favor reactive in setting of stress.  There is evidence of some dehydration in her urine.  She is not febrile.   1419 Neg pregnancy test.   1423 ECG was done which I did not order.  This is completely unremarkable.   1459 The patient has COVID.  She also tested positive for cannabinoids. I suspect his 2 things together have contributed to her clinical picture today.  I am to give her some droperidol.   1459 She is not hypoxic.   1727 The patient would like to go home.  She is nontoxic.  I think discharge is reasonable.  I told her that she has COVID.  I confirm that she is vaccinated  against COVID.  I suspect this was both COVID and some cannabinoid efffect.   1728 I gave her ER precautions.  She is nontoxic.  She has no dizziness or lightheadedness at this time.  She feels safe with discharge.  I am going to discharge her at her request at this time.     Procedures              EKG Interpretation:      Interpreted by Keith Oropeza MD  Time reviewed: 222  Symptoms at time of EKG: dizziness   Rhythm: normal sinus   Rate: normal  Axis: normal  Ectopy: none  Conduction: normal  ST Segments/ T Waves: No ST-T wave changes  Q Waves: none  Comparison to prior: No old EKG available    Clinical Impression: normal EKG        Results for orders placed or performed during the hospital encounter of 01/01/24 (from the past 24 hour(s))   Glucose by meter   Result Value Ref Range    GLUCOSE BY METER POCT 99 70 - 99 mg/dL   Urine Drug Screen    Narrative    The following orders were created for panel order Urine Drug Screen.  Procedure                               Abnormality         Status                     ---------                               -----------         ------                     Urine Drug Screen Panel[[id number]]      Abnormal            Final result                 Please view results for these tests on the individual orders.   UA with Microscopic reflex to Culture    Specimen: Urine, Midstream   Result Value Ref Range    Color Urine Yellow Colorless, Straw, Light Yellow, Yellow    Appearance Urine Clear Clear    Glucose Urine Negative Negative mg/dL    Bilirubin Urine Negative Negative    Ketones Urine 20 (A) Negative mg/dL    Specific Gravity Urine 1.017 1.003 - 1.035    Blood Urine Negative Negative    pH Urine 5.0 5.0 - 7.0    Protein Albumin Urine Negative Negative mg/dL    Urobilinogen Urine Normal Normal, 2.0 mg/dL    Nitrite Urine Negative Negative    Leukocyte Esterase Urine Negative Negative    Bacteria Urine Few (A) None Seen /HPF    Mucus Urine Present (A) None Seen /LPF    RBC  Urine <1 <=2 /HPF    WBC Urine 1 <=5 /HPF    Squamous Epithelials Urine 1 <=1 /HPF    Narrative    Urine Culture not indicated   HCG qualitative urine (UPT)   Result Value Ref Range    hCG Urine Qualitative Negative Negative   Asymptomatic Influenza A/B, RSV, & SARS-CoV2 PCR (COVID-19) Nose    Specimen: Nose; Swab   Result Value Ref Range    Influenza A PCR Negative Negative    Influenza B PCR Negative Negative    RSV PCR Negative Negative    SARS CoV2 PCR Positive (A) Negative    Narrative    Testing was performed using the Xpert Xpress CoV2/Flu/RSV Assay on the [employer] Instrument. This test should be ordered for the detection of SARS-CoV-2, influenza, and RSV viruses in individuals who meet clinical and/or epidemiological criteria. Test performance is unknown in asymptomatic patients. This test is for in vitro diagnostic use under the FDA EUA for laboratories certified under CLIA to perform high or moderate complexity testing. This test has not been FDA cleared or approved. A negative result does not rule out the presence of PCR inhibitors in the specimen or target RNA in concentration below the limit of detection for the assay. If only one viral target is positive but coinfection with multiple targets is suspected, the sample should be re-tested with another FDA cleared, approved, or authorized test, if coinfection would change clinical management. This test was validated by the [facility]. These laboratories are certified under the Clinical Laboratory Improvement Amendments of 1988 (CLIA-88) as qualified to perform high complexity laboratory testing.   Group A Streptococcus PCR Throat Swab    Specimen: Throat; Swab   Result Value Ref Range    Group A strep by PCR Not Detected Not Detected    Narrative    The Xpert Xpress Strep A test, performed on the [employer]  Instrument Systems, is a rapid, qualitative in vitro diagnostic test for the detection of Streptococcus pyogenes (Group A  ß-hemolytic Streptococcus, Strep A) in throat swab specimens from patients with signs and symptoms of pharyngitis. The Xpert Xpress Strep A test can be used as an aid in the diagnosis of Group A Streptococcal pharyngitis. The assay is not intended to monitor treatment for Group A Streptococcus infections. The Xpert Xpress Strep A test utilizes an automated real-time polymerase chain reaction (PCR) to detect Streptococcus pyogenes DNA.   Urine Drug Screen Panel   Result Value Ref Range    Amphetamines Urine Screen Negative Screen Negative    Barbituates Urine Screen Negative Screen Negative    Benzodiazepine Urine Screen Negative Screen Negative    Cannabinoids Urine Screen Positive (A) Screen Negative    Cocaine Urine Screen Negative Screen Negative    Fentanyl Qual Urine Screen Negative Screen Negative    Opiates Urine Screen Negative Screen Negative    PCP Urine Screen Negative Screen Negative   TSH with free T4 reflex   Result Value Ref Range    TSH 0.71 0.30 - 4.20 uIU/mL   Magnesium   Result Value Ref Range    Magnesium 1.6 (L) 1.7 - 2.3 mg/dL   Comprehensive metabolic panel   Result Value Ref Range    Sodium 134 (L) 135 - 145 mmol/L    Potassium 3.5 3.4 - 5.3 mmol/L    Carbon Dioxide (CO2) 23 22 - 29 mmol/L    Anion Gap 11 7 - 15 mmol/L    Urea Nitrogen 9.6 6.0 - 20.0 mg/dL    Creatinine 0.76 0.51 - 0.95 mg/dL    GFR Estimate >90 >60 mL/min/1.73m2    Calcium 9.5 8.6 - 10.0 mg/dL    Chloride 100 98 - 107 mmol/L    Glucose 87 70 - 99 mg/dL    Alkaline Phosphatase 65 40 - 150 U/L    AST 23 0 - 45 U/L    ALT 21 0 - 50 U/L    Protein Total 7.5 6.4 - 8.3 g/dL    Albumin 4.3 3.5 - 5.2 g/dL    Bilirubin Total 0.4 <=1.2 mg/dL   CBC with platelets   Result Value Ref Range    WBC Count 13.3 (H) 4.0 - 11.0 10e3/uL    RBC Count 4.51 3.80 - 5.20 10e6/uL    Hemoglobin 14.1 11.7 - 15.7 g/dL    Hematocrit 40.6 35.0 - 47.0 %    MCV 90 78 - 100 fL    MCH 31.3 26.5 - 33.0 pg    MCHC 34.7 31.5 - 36.5 g/dL    RDW 11.9 10.0 - 15.0 %     Platelet Count 263 150 - 450 10e3/uL       Medications   lactated ringers BOLUS 1,000 mL (0 mLs Intravenous Stopped 1/1/24 1558)   droPERidol (INAPSINE) injection 1.25 mg (1.25 mg Intravenous $Given 1/1/24 1348)   ketorolac (TORADOL) injection 15 mg (15 mg Intravenous $Given 1/1/24 1347)   droPERidol (INAPSINE) injection 1.25 mg (1.25 mg Intravenous $Given 1/1/24 1519)       Assessments & Plan (with Medical Decision Making)     Doubt central process. No vertigo. Suspect effect of GLP-1 analog vs viral syndrome. Exam reassuring.  There may be an anxiety component.  Going to give her some droperidol, give fluids, check basic labs including a strep test and COVID and flu swab.  Will reassess her after this.  I do not think she needs advanced imaging or neurology consult at this time.      I have reviewed the nursing notes.    I have reviewed the findings, diagnosis, plan and need for follow up with the patient.        Medical Decision Making  The patient's presentation was of moderate complexity (an undiagnosed new problem with uncertain diagnosis).    The patient's evaluation involved:  review of external note(s) from 2 sources (see separate area of note for details)  strong consideration of a test (MRI) that was ultimately deferred  ordering and/or review of 3+ test(s) in this encounter (see separate area of note for details)    The patient's management necessitated moderate risk (prescription drug management including medications given in the ED).        Discharge Medication List as of 1/1/2024  5:27 PM          Final diagnoses:   COVID-19       1/1/2024   [facility] EMERGENCY DEPT       Keith Oropeza MD  01/01/24 0293       Keith Oropeza MD  01/01/24 9134

## 2024-01-01 NOTE — ED TRIAGE NOTES
Patient arrives with intermittent dizziness, tingling in extremities, and headache that started at 10am.      Triage Assessment (Adult)       Row Name 01/01/24 1226          Triage Assessment    Airway WDL WDL        Respiratory WDL    Respiratory WDL WDL        Skin Circulation/Temperature WDL    Skin Circulation/Temperature WDL WDL        Cardiac WDL    Cardiac WDL WDL        Peripheral/Neurovascular WDL    Peripheral Neurovascular WDL WDL        Cognitive/Neuro/Behavioral WDL    Cognitive/Neuro/Behavioral WDL WDL

## 2024-01-31 DIAGNOSIS — E66.09 CLASS 1 OBESITY DUE TO EXCESS CALORIES WITH SERIOUS COMORBIDITY AND BODY MASS INDEX (BMI) OF 32.0 TO 32.9 IN ADULT: ICD-10-CM

## 2024-01-31 DIAGNOSIS — E66.811 CLASS 1 OBESITY DUE TO EXCESS CALORIES WITH SERIOUS COMORBIDITY AND BODY MASS INDEX (BMI) OF 32.0 TO 32.9 IN ADULT: ICD-10-CM

## 2024-01-31 NOTE — TELEPHONE ENCOUNTER
Patient called stating that she can not find saxenda rx anywhere- she did track down a box in [facility],    Asked patient to do a pharmacy to pharmacy transfer. As the rx is current,  if not appropiate please send new rx. patient confirmed she is taking 3.0 mg saxenda.      Teresa GRACE  Station

## 2024-02-11 ENCOUNTER — HEALTH MAINTENANCE LETTER (OUTPATIENT)
Age: 29
End: 2024-02-11

## 2024-02-21 ENCOUNTER — TELEPHONE (OUTPATIENT)
Dept: FAMILY MEDICINE | Facility: CLINIC | Age: 29
End: 2024-02-21

## 2024-02-21 NOTE — TELEPHONE ENCOUNTER
Patient Quality Outreach    Patient is due for the following:   Asthma  -  ACT needed    Next Steps:   Patient was assigned appropriate questionnaire to complete    Type of outreach:    Sent [email or web address] message.    Next Steps:  Reach out within 90 days via [email or web address].    Max number of attempts reached: No. Will try again in 90 days if patient still on fail list.    Questions for provider review:    None           Denise Mtz, [facility]  Chart routed to Care Team.

## 2024-02-29 ENCOUNTER — NURSE TRIAGE (OUTPATIENT)
Dept: FAMILY MEDICINE | Facility: CLINIC | Age: 29
End: 2024-02-29

## 2024-02-29 NOTE — TELEPHONE ENCOUNTER
Nurse Triage SBAR    Is this a 2nd Level Triage? NO    Situation: Patient's significant other calling in, got verbal confirmation from patient that okay to talk with them. Patient has been having abdominal pain for the last 3 weeks and has been having nausea, vomiting, hot & cold sweats for the last few days. She vomited 6 times yesterday and has vomiting all morning. This is also her second month with a missed period and she has taken several pregnancy tests which are all negative.     Background: Hx: polycystic ovarian syndrome    Patient has not taken a covid test    Assessment: No blood or black stools, no blood in urine.   Abdominal pain has been going on for 3 weeks but progressively getting worse. It comes and goes like cramps and patient says its about a 5/10 for pain level.     She is making urine and able to keep some small amount of liquids down. No fevers. No shortness of breath. No chest pain.     Protocol Recommended Disposition:   See in Office Today    Recommendation: Urged significant other that patient should be seen today and could at least be seen a urgent care since these symptoms are not getting better. Significant other agrees but patient is resistant to going in due to insurance not starting until tomorrow and wants to wait until tomorrow.  Patient has not seen PCP since 2021 but had been seeing OB/GYN in [place] in 08/2023. Significant other wondering if she can get appointment in [facility] tomorrow.  Writer said they could send this triage over but could not guarantee an appointment. Urged them again to go into urgent care today and if not should be seen at urgent care or somewhere tomorrow when insurance takes place.     Can patient be worked into your clinic tomorrow? Would see anyone.    Routed to provider    Does the patient meet one of the following criteria for ADS visit consideration? No    Kimberly Real RN on 2/29/2024 at 7:52 AM    Reason for Disposition   MODERATE  pain (e.g., interferes with normal activities that comes and goes (cramps) lasts > 24 hours  (Exception: Pain with Vomiting or Diarrhea - see that Protocol.)    Additional Information   Negative: Passed out (i.e., fainted, collapsed and was not responding)   Negative: Shock suspected (e.g., cold/pale/clammy skin, too weak to stand, low BP, rapid pulse)   Negative: Sounds like a life-threatening emergency to the triager   Negative: Followed an abdomen (stomach) injury   Negative: Chest pain   Negative: Abdominal pain and pregnant < 20 weeks   Negative: Abdominal pain and pregnant 20 or more weeks   Negative: Pain is mainly in upper abdomen (if needed ask: 'is it mainly above the belly button?')   Negative: Abdomen bloating or swelling are main symptoms   Negative: SEVERE abdominal pain (e.g., excruciating)   Negative: Vomiting red blood or black (coffee ground) material   Negative: Blood in bowel movements  (Exception: Blood on surface of BM with constipation.)   Negative: Black or tarry bowel movements  (Exception: Chronic-unchanged black-grey BMs AND is taking iron pills or Pepto-Bismol.)   Negative: MILD TO MODERATE constant pain lasting > 2 hours   Negative: Vomiting bile (green color)   Negative: Patient sounds very sick or weak to the triager   Negative: Vomiting and abdomen looks much more swollen than usual   Negative: White of the eyes have turned yellow (i.e., jaundice)   Negative: Blood in urine (red, pink, or tea-colored)   Negative: Fever > 103 F (39.4 C)   Negative: Fever > 101 F (38.3 C) and over 60 years of age   Negative: Fever > 100.0 F (37.8 C) and has diabetes mellitus or a weak immune system (e.g., HIV positive, cancer chemotherapy, organ transplant, splenectomy, chronic steroids)   Negative: Fever > 100.0 F (37.8 C) and bedridden (e.g., CVA, chronic illness, recovering from surgery)   Negative: Pregnant or could be pregnant (i.e., missed last menstrual period)    Protocols used: Abdominal Pain -  Female-A-[place]

## 2024-02-29 NOTE — TELEPHONE ENCOUNTER
I recommend ADS vs the ER given the duration of her symptoms, and worsening symptoms she likely needs a CT scan, which can be done in those two settings.     Roman Luevano PA-C

## 2024-02-29 NOTE — TELEPHONE ENCOUNTER
Spoke with Madelyn, she is adamant that she does not want to be seen today because her insurance does not start until tomorrow. I did schedule an appointment in clinic for tomorrow per her request. She is aware she may need to be sent to [place] to ADS or for a CT scan if indicated. She does say she will go to the ED if her pain and symptoms worsen today

## 2024-03-01 ENCOUNTER — OFFICE VISIT (OUTPATIENT)
Dept: FAMILY MEDICINE | Facility: CLINIC | Age: 29
End: 2024-03-01
Payer: COMMERCIAL

## 2024-03-01 VITALS
RESPIRATION RATE: 18 BRPM | TEMPERATURE: 97.7 F | BODY MASS INDEX: 29.41 KG/M2 | DIASTOLIC BLOOD PRESSURE: 76 MMHG | WEIGHT: 166 LBS | SYSTOLIC BLOOD PRESSURE: 116 MMHG | OXYGEN SATURATION: 98 % | HEART RATE: 80 BPM | HEIGHT: 63 IN

## 2024-03-01 DIAGNOSIS — R11.2 NAUSEA AND VOMITING, UNSPECIFIED VOMITING TYPE: ICD-10-CM

## 2024-03-01 DIAGNOSIS — R42 DIZZINESS: ICD-10-CM

## 2024-03-01 DIAGNOSIS — N91.2 LACK OF MENSES: ICD-10-CM

## 2024-03-01 DIAGNOSIS — R10.84 ABDOMINAL PAIN, GENERALIZED: Primary | ICD-10-CM

## 2024-03-01 LAB
ALBUMIN SERPL BCG-MCNC: 4.4 G/DL (ref 3.5–5.2)
ALBUMIN UR-MCNC: NEGATIVE MG/DL
ALP SERPL-CCNC: 67 U/L (ref 40–150)
ALT SERPL W P-5'-P-CCNC: 15 U/L (ref 0–50)
ANION GAP SERPL CALCULATED.3IONS-SCNC: 11 MMOL/L (ref 7–15)
APPEARANCE UR: CLEAR
AST SERPL W P-5'-P-CCNC: 17 U/L (ref 0–45)
BILIRUB SERPL-MCNC: 0.3 MG/DL
BILIRUB UR QL STRIP: NEGATIVE
BUN SERPL-MCNC: 14.2 MG/DL (ref 6–20)
CALCIUM SERPL-MCNC: 9.5 MG/DL (ref 8.6–10)
CHLORIDE SERPL-SCNC: 102 MMOL/L (ref 98–107)
COLOR UR AUTO: YELLOW
CREAT SERPL-MCNC: 0.87 MG/DL (ref 0.51–0.95)
CRP SERPL-MCNC: <3 MG/L
DEPRECATED HCO3 PLAS-SCNC: 25 MMOL/L (ref 22–29)
EGFRCR SERPLBLD CKD-EPI 2021: >90 ML/MIN/1.73M2
ERYTHROCYTE [DISTWIDTH] IN BLOOD BY AUTOMATED COUNT: 12.5 % (ref 10–15)
ERYTHROCYTE [SEDIMENTATION RATE] IN BLOOD BY WESTERGREN METHOD: 15 MM/HR (ref 0–20)
GLUCOSE SERPL-MCNC: 100 MG/DL (ref 70–99)
GLUCOSE UR STRIP-MCNC: NEGATIVE MG/DL
HBA1C MFR BLD: 5.3 % (ref 0–5.6)
HCG UR QL: NEGATIVE
HCT VFR BLD AUTO: 39.4 % (ref 35–47)
HGB BLD-MCNC: 13.4 G/DL (ref 11.7–15.7)
HGB UR QL STRIP: NEGATIVE
KETONES UR STRIP-MCNC: 15 MG/DL
LEUKOCYTE ESTERASE UR QL STRIP: NEGATIVE
LIPASE SERPL-CCNC: 24 U/L (ref 13–60)
MCH RBC QN AUTO: 31.4 PG (ref 26.5–33)
MCHC RBC AUTO-ENTMCNC: 34 G/DL (ref 31.5–36.5)
MCV RBC AUTO: 92 FL (ref 78–100)
NITRATE UR QL: NEGATIVE
PH UR STRIP: 6 [PH] (ref 5–7)
PLATELET # BLD AUTO: 307 10E3/UL (ref 150–450)
POTASSIUM SERPL-SCNC: 4 MMOL/L (ref 3.4–5.3)
PROT SERPL-MCNC: 7.7 G/DL (ref 6.4–8.3)
RBC # BLD AUTO: 4.27 10E6/UL (ref 3.8–5.2)
SODIUM SERPL-SCNC: 138 MMOL/L (ref 135–145)
SP GR UR STRIP: 1.02 (ref 1–1.03)
UROBILINOGEN UR STRIP-ACNC: 0.2 E.U./DL
WBC # BLD AUTO: 7.7 10E3/UL (ref 4–11)

## 2024-03-01 PROCEDURE — 81003 URINALYSIS AUTO W/O SCOPE: CPT | Performed by: FAMILY MEDICINE

## 2024-03-01 PROCEDURE — 80053 COMPREHEN METABOLIC PANEL: CPT | Performed by: FAMILY MEDICINE

## 2024-03-01 PROCEDURE — 85652 RBC SED RATE AUTOMATED: CPT | Performed by: FAMILY MEDICINE

## 2024-03-01 PROCEDURE — 36415 COLL VENOUS BLD VENIPUNCTURE: CPT | Performed by: FAMILY MEDICINE

## 2024-03-01 PROCEDURE — 83690 ASSAY OF LIPASE: CPT | Performed by: FAMILY MEDICINE

## 2024-03-01 PROCEDURE — 81025 URINE PREGNANCY TEST: CPT | Performed by: FAMILY MEDICINE

## 2024-03-01 PROCEDURE — 99213 OFFICE O/P EST LOW 20 MIN: CPT | Performed by: FAMILY MEDICINE

## 2024-03-01 PROCEDURE — 86140 C-REACTIVE PROTEIN: CPT | Performed by: FAMILY MEDICINE

## 2024-03-01 PROCEDURE — 83036 HEMOGLOBIN GLYCOSYLATED A1C: CPT | Performed by: FAMILY MEDICINE

## 2024-03-01 PROCEDURE — 85027 COMPLETE CBC AUTOMATED: CPT | Performed by: FAMILY MEDICINE

## 2024-03-01 RX ORDER — LIRAGLUTIDE 6 MG/ML
INJECTION, SOLUTION SUBCUTANEOUS DAILY
COMMUNITY
End: 2024-04-24 | Stop reason: DRUGHIGH

## 2024-03-01 ASSESSMENT — ASTHMA QUESTIONNAIRES
ACT_TOTALSCORE: 23
QUESTION_3 LAST FOUR WEEKS HOW OFTEN DID YOUR ASTHMA SYMPTOMS (WHEEZING, COUGHING, SHORTNESS OF BREATH, CHEST TIGHTNESS OR PAIN) WAKE YOU UP AT NIGHT OR EARLIER THAN USUAL IN THE MORNING: NOT AT ALL
QUESTION_2 LAST FOUR WEEKS HOW OFTEN HAVE YOU HAD SHORTNESS OF BREATH: ONCE OR TWICE A WEEK
QUESTION_1 LAST FOUR WEEKS HOW MUCH OF THE TIME DID YOUR ASTHMA KEEP YOU FROM GETTING AS MUCH DONE AT WORK, SCHOOL OR AT HOME: NONE OF THE TIME
QUESTION_4 LAST FOUR WEEKS HOW OFTEN HAVE YOU USED YOUR RESCUE INHALER OR NEBULIZER MEDICATION (SUCH AS ALBUTEROL): ONCE A WEEK OR LESS
ACT_TOTALSCORE: 23
QUESTION_5 LAST FOUR WEEKS HOW WOULD YOU RATE YOUR ASTHMA CONTROL: COMPLETELY CONTROLLED

## 2024-03-01 ASSESSMENT — ENCOUNTER SYMPTOMS: VOMITING: 1

## 2024-03-01 ASSESSMENT — PATIENT HEALTH QUESTIONNAIRE - PHQ9
SUM OF ALL RESPONSES TO PHQ QUESTIONS 1-9: 7
SUM OF ALL RESPONSES TO PHQ QUESTIONS 1-9: 7
10. IF YOU CHECKED OFF ANY PROBLEMS, HOW DIFFICULT HAVE THESE PROBLEMS MADE IT FOR YOU TO DO YOUR WORK, TAKE CARE OF THINGS AT HOME, OR GET ALONG WITH OTHER PEOPLE: SOMEWHAT DIFFICULT

## 2024-03-01 ASSESSMENT — PAIN SCALES - GENERAL: PAINLEVEL: NO PAIN (0)

## 2024-03-01 NOTE — PROGRESS NOTES
Assessment & Plan     Abdominal pain, generalized  Lack of menses  Nausea and vomiting, unspecified vomiting type  Dizziness  Nausea, abdominal pain ~3weeks, 3 days ago has been vomiting every couple of hours with worsening ab pain and dizziness. Much improved today.  LMP 1/24/2024, attempting to conceive, multiple at home pregnancy tests negative. Menstrual periods usually regular.  No past abdominal surgeries. She has PCOS.  Exam today is normal.  Labs as below. Due to improvement and benign abdominal exam today, recommend continuing with supportive cares, push fluids take ibuprofen and tylenol as needed. If labs are significant abnormal or if symptoms worsens obtain CT AP.  - CBC with platelets; Future  - Comprehensive metabolic panel (BMP + Alb, Alk Phos, ALT, AST, Total. Bili, TP); Future  - Lipase; Future  - CRP, inflammation; Future  - ESR: Erythrocyte sedimentation rate; Future  - Hemoglobin A1c; Future  - HCG qualitative urine; Future  - UA Macroscopic with reflex to Microscopic and Culture - Lab Collect; Future  - HCG qualitative urine; Future              Subjective   Madelyn is a 28 year old, presenting for the following health issues:  Abdominal Pain and Vomiting        3/1/2024     8:20 AM   Additional Questions   Roomed by Sandee     History of Present Illness       Reason for visit:  Stomach pain lack of period  Symptom onset:  More than a month  Symptoms include:  Stomach discomfort lack of period  Symptom intensity:  Moderate  Symptom progression:  Staying the same  Had these symptoms before:  No  What makes it worse:  Na  What makes it better:  Na    She eats 4 or more servings of fruits and vegetables daily.She consumes 0 sweetened beverage(s) daily.She exercises with enough effort to increase her heart rate 20 to 29 minutes per day.  She exercises with enough effort to increase her heart rate 5 days per week. She is missing 1 dose(s) of medications per week.   Pain comes and goes and at times is  "unbearable  Has taken several UPT at home all negative  Patient is trying to conceive            A couple of months ago missed her period, took pregnancy tests negative  Takes saxenda but has been on this  Past couple of weeks has been having nausea in the morning, abdominal pain  Past 3 days vomiting every couple of hours, dry heaving, abdominal pain mainly around umbilical area  Doing fertility treatment in [place]  Bilateral ovarian cysts  No past abdominal surgeries  Usually has painful but regular periods  LMP 01/24/2024          Objective    /76   Pulse 80   Temp 97.7  F (36.5  C)   Resp 18   Ht 1.594 m (5' 2.75\")   Wt 75.3 kg (166 lb)   LMP 01/04/2024 (Approximate)   SpO2 98%   Breastfeeding No   BMI 29.64 kg/m    Body mass index is 29.64 kg/m .  Physical Exam   GENERAL: alert and no distress  NECK: no adenopathy, no asymmetry, masses, or scars  RESP: lungs clear to auscultation - no rales, rhonchi or wheezes  CV: regular rate and rhythm, normal S1 S2, no S3 or S4, no murmur, click or rub, no peripheral edema  ABDOMEN: soft, nontender, no hepatosplenomegaly, no masses and bowel sounds normal  MS: no gross musculoskeletal defects noted, no edema            Signed Electronically by: Isabela Bryant MD    "

## 2024-04-23 NOTE — TELEPHONE ENCOUNTER
Requested Prescriptions   Pending Prescriptions Disp Refills    SAXENDA 18 MG/3ML pen [Pharmacy Med Name: SAXENDA 18MG/3ML SOPN] 30 mL 0     Sig: INJECT 0.6 MG SUBCUTANEOUS DAILY FOR 7 DAYS, THEN 1.2 MG DAILY FOR 7 DAYS, THEN 1.8 MG DAILY FOR 7 DAYS, THEN 2.4 MG DAILY FOR 7 DAYS, THEN 3 MG DAILY FOR 62 DAYS.       GLP-1 Agonists Protocol Failed - 4/22/2024  5:36 PM        Failed - Order for Saxenda with diagnosis of diabetes        Failed - Medication indicated for associated diagnosis     Medication is associated with one or more of the following diagnoses:     Type 2 diabetes mellitus           Passed - HgbA1C in past 3 or 6 months     If HgbA1C is 8 or greater, it needs to be on file within the past 3 months.  If less than 8, must be on file within the past 6 months.     Recent Labs   Lab Test 03/01/24  0913   A1C 5.3             Passed - Medication is active on med list        Passed - Has GFR on file in past 12 months and most recent value is normal        Passed - Recent (6 mo) or future (90 days) visit within the authorizing provider's specialty     The patient must have completed an in-person or virtual visit within the past 6 months or has a future visit scheduled within the next 90 days with the authorizing provider s specialty.  Urgent care and e-visits do not quality as an office visit for this protocol.          Passed - Patient is age 18 or older        Passed - No active pregnancy on record        Passed - No positive pregnancy test in past 12 months

## 2024-04-24 ENCOUNTER — VIRTUAL VISIT (OUTPATIENT)
Dept: FAMILY MEDICINE | Facility: CLINIC | Age: 29
End: 2024-04-24
Payer: COMMERCIAL

## 2024-04-24 DIAGNOSIS — E66.811 CLASS 1 OBESITY DUE TO EXCESS CALORIES WITH SERIOUS COMORBIDITY AND BODY MASS INDEX (BMI) OF 32.0 TO 32.9 IN ADULT: Primary | ICD-10-CM

## 2024-04-24 DIAGNOSIS — E66.09 CLASS 1 OBESITY DUE TO EXCESS CALORIES WITH SERIOUS COMORBIDITY AND BODY MASS INDEX (BMI) OF 32.0 TO 32.9 IN ADULT: Primary | ICD-10-CM

## 2024-04-24 DIAGNOSIS — F33.1 MODERATE EPISODE OF RECURRENT MAJOR DEPRESSIVE DISORDER (H): ICD-10-CM

## 2024-04-24 PROBLEM — F41.1 GAD (GENERALIZED ANXIETY DISORDER): Status: ACTIVE | Noted: 2024-04-24

## 2024-04-24 PROBLEM — R87.610 ASCUS OF CERVIX WITH NEGATIVE HIGH RISK HPV: Status: ACTIVE | Noted: 2021-04-23

## 2024-04-24 PROCEDURE — 99214 OFFICE O/P EST MOD 30 MIN: CPT | Mod: 95 | Performed by: PHYSICIAN ASSISTANT

## 2024-04-24 RX ORDER — SEMAGLUTIDE 1 MG/.5ML
1 INJECTION, SOLUTION SUBCUTANEOUS WEEKLY
Qty: 2 ML | Refills: 0 | Status: SHIPPED | OUTPATIENT
Start: 2024-06-23 | End: 2024-09-13

## 2024-04-24 RX ORDER — SEMAGLUTIDE 0.5 MG/.5ML
0.5 INJECTION, SOLUTION SUBCUTANEOUS WEEKLY
Qty: 2 ML | Refills: 0 | Status: SHIPPED | OUTPATIENT
Start: 2024-05-22 | End: 2024-06-19

## 2024-04-24 ASSESSMENT — PATIENT HEALTH QUESTIONNAIRE - PHQ9
10. IF YOU CHECKED OFF ANY PROBLEMS, HOW DIFFICULT HAVE THESE PROBLEMS MADE IT FOR YOU TO DO YOUR WORK, TAKE CARE OF THINGS AT HOME, OR GET ALONG WITH OTHER PEOPLE: SOMEWHAT DIFFICULT
SUM OF ALL RESPONSES TO PHQ QUESTIONS 1-9: 5
SUM OF ALL RESPONSES TO PHQ QUESTIONS 1-9: 5

## 2024-04-24 NOTE — PROGRESS NOTES
Madelyn is a 28 year old who is being evaluated via a billable video visit.    How would you like to obtain your AVS? MyChart  If the video visit is dropped, the invitation should be resent by: Text to cell phone: [phone]  Will anyone else be joining your video visit? No      Assessment & Plan   Moderate episode of recurrent major depressive disorder (H)  PHQ score is elevated with passive thoughts of SI. No active plan. This is chronic and patient does not want to change her regimen and deferred referral to . She is continuing to work on her depression and will follow-up with us as needed.   - Semaglutide-Weight Management (WEGOVY) 0.5 MG/0.5ML pen; Inject 0.5 mg Subcutaneous once a week for 28 days  - Semaglutide-Weight Management (WEGOVY) 1 MG/0.5ML pen; Inject 1 mg Subcutaneous once a week  - Semaglutide-Weight Management (WEGOVY) 1.7 MG/0.75ML pen; Inject 1.7 mg Subcutaneous once a week    Class 1 obesity due to excess calories with serious comorbidity and body mass index (BMI) of 32.0 to 32.9 in adult  Doing well on Saxenda. This is not available at her pharmacy, but Wegovy is. Will attempt to get this approved. She has done well since starting a GLP-1 agonist and has lost about 10% of her BW thus far. Follow-up in 3 months after switching to Wegovy.   - Semaglutide-Weight Management (WEGOVY) 0.5 MG/0.5ML pen; Inject 0.5 mg Subcutaneous once a week for 28 days  - Semaglutide-Weight Management (WEGOVY) 1 MG/0.5ML pen; Inject 1 mg Subcutaneous once a week  - Semaglutide-Weight Management (WEGOVY) 1.7 MG/0.75ML pen; Inject 1.7 mg Subcutaneous once a week    Depression Screening Follow Up        12/18/2023     5:25 PM   [facility] ([facility])   Within the last month, have you wished you were dead or wished you could go to sleep and not wake up? No   Within the last month, have you had any actual thoughts of killing yourself? No   Within the last month, have you ever done anything, started to do anything,  or prepared to do anything to end your life? No       Follow Up Actions Taken  Crisis resource information provided in the After Visit Summary  Patient declined referral.    Discussed the following ways the patient can remain in a safe environment:  be around others    Subjective   Madelyn is a 28 year old, presenting for the following health issues:  weight follow up         4/24/2024     8:25 AM   Additional Questions   Roomed by Marleni FRIEDMAN CMA   Accompanied by Self     History of Present Illness       Reason for visit:  Weight loss (would like to switch back to wegovy as the pharmacy does not have saxenda)    She eats 4 or more servings of fruits and vegetables daily.She consumes 0 sweetened beverage(s) daily.She exercises with enough effort to increase her heart rate 30 to 60 minutes per day.  She exercises with enough effort to increase her heart rate 5 days per week.   She is taking medications regularly.     Review of Systems  See HPI       Objective           Vitals:  No vitals were obtained today due to virtual visit.    Physical Exam   GENERAL: alert and no distress  EYES: Eyes grossly normal to inspection.  No discharge or erythema, or obvious scleral/conjunctival abnormalities.  RESP: No audible wheeze, cough, or visible cyanosis.    SKIN: Visible skin clear. No significant rash, abnormal pigmentation or lesions.  NEURO: Cranial nerves grossly intact.  Mentation and speech appropriate for age.  PSYCH: Appropriate affect, tone, and pace of words        Video-Visit Details    Type of service:  Video Visit   Originating Location (pt. Location): Home    Distant Location (provider location):  On-site  Platform used for Video Visit: nAa  Signed Electronically by: Roman Luevano PA-C

## 2024-04-25 RX ORDER — LIRAGLUTIDE 6 MG/ML
INJECTION, SOLUTION SUBCUTANEOUS
Qty: 30 ML | Refills: 0 | OUTPATIENT
Start: 2024-04-25

## 2024-06-13 ENCOUNTER — OFFICE VISIT (OUTPATIENT)
Dept: URGENT CARE | Facility: URGENT CARE | Age: 29
End: 2024-06-13
Payer: COMMERCIAL

## 2024-06-13 ENCOUNTER — VIRTUAL VISIT (OUTPATIENT)
Dept: URGENT CARE | Facility: CLINIC | Age: 29
End: 2024-06-13
Payer: COMMERCIAL

## 2024-06-13 ENCOUNTER — OFFICE VISIT (OUTPATIENT)
Dept: PEDIATRICS | Facility: CLINIC | Age: 29
End: 2024-06-13
Payer: COMMERCIAL

## 2024-06-13 VITALS
RESPIRATION RATE: 16 BRPM | HEART RATE: 69 BPM | DIASTOLIC BLOOD PRESSURE: 81 MMHG | OXYGEN SATURATION: 100 % | SYSTOLIC BLOOD PRESSURE: 141 MMHG | TEMPERATURE: 98.7 F | BODY MASS INDEX: 26.6 KG/M2 | WEIGHT: 149 LBS

## 2024-06-13 VITALS
RESPIRATION RATE: 14 BRPM | OXYGEN SATURATION: 100 % | HEIGHT: 63 IN | SYSTOLIC BLOOD PRESSURE: 149 MMHG | TEMPERATURE: 98.7 F | WEIGHT: 149 LBS | DIASTOLIC BLOOD PRESSURE: 91 MMHG | BODY MASS INDEX: 26.4 KG/M2 | HEART RATE: 74 BPM

## 2024-06-13 DIAGNOSIS — R11.2 NAUSEA AND VOMITING, UNSPECIFIED VOMITING TYPE: Primary | ICD-10-CM

## 2024-06-13 DIAGNOSIS — K29.00 ACUTE GASTRITIS WITHOUT HEMORRHAGE, UNSPECIFIED GASTRITIS TYPE: ICD-10-CM

## 2024-06-13 DIAGNOSIS — E86.0 DEHYDRATION: Primary | ICD-10-CM

## 2024-06-13 DIAGNOSIS — R11.2 NAUSEA AND VOMITING, UNSPECIFIED VOMITING TYPE: ICD-10-CM

## 2024-06-13 LAB
ALBUMIN SERPL BCG-MCNC: 4.7 G/DL (ref 3.5–5.2)
ALP SERPL-CCNC: 66 U/L (ref 40–150)
ALT SERPL W P-5'-P-CCNC: 14 U/L (ref 0–50)
ANION GAP SERPL CALCULATED.3IONS-SCNC: 22 MMOL/L (ref 7–15)
AST SERPL W P-5'-P-CCNC: 17 U/L (ref 0–45)
BASOPHILS # BLD AUTO: 0 10E3/UL (ref 0–0.2)
BASOPHILS NFR BLD AUTO: 0 %
BILIRUB SERPL-MCNC: 0.4 MG/DL
BUN SERPL-MCNC: 12.6 MG/DL (ref 6–20)
CALCIUM SERPL-MCNC: 9.8 MG/DL (ref 8.6–10)
CHLORIDE SERPL-SCNC: 97 MMOL/L (ref 98–107)
CREAT SERPL-MCNC: 0.86 MG/DL (ref 0.51–0.95)
DEPRECATED HCO3 PLAS-SCNC: 18 MMOL/L (ref 22–29)
EGFRCR SERPLBLD CKD-EPI 2021: >90 ML/MIN/1.73M2
EOSINOPHIL # BLD AUTO: 0 10E3/UL (ref 0–0.7)
EOSINOPHIL NFR BLD AUTO: 0 %
ERYTHROCYTE [DISTWIDTH] IN BLOOD BY AUTOMATED COUNT: 11.7 % (ref 10–15)
GLUCOSE SERPL-MCNC: 76 MG/DL (ref 70–99)
HCG UR QL: NEGATIVE
HCT VFR BLD AUTO: 41.4 % (ref 35–47)
HGB BLD-MCNC: 14.2 G/DL (ref 11.7–15.7)
IMM GRANULOCYTES # BLD: 0 10E3/UL
IMM GRANULOCYTES NFR BLD: 0 %
LYMPHOCYTES # BLD AUTO: 1 10E3/UL (ref 0.8–5.3)
LYMPHOCYTES NFR BLD AUTO: 9 %
MCH RBC QN AUTO: 30.3 PG (ref 26.5–33)
MCHC RBC AUTO-ENTMCNC: 34.3 G/DL (ref 31.5–36.5)
MCV RBC AUTO: 88 FL (ref 78–100)
MONOCYTES # BLD AUTO: 0.5 10E3/UL (ref 0–1.3)
MONOCYTES NFR BLD AUTO: 5 %
NEUTROPHILS # BLD AUTO: 8.8 10E3/UL (ref 1.6–8.3)
NEUTROPHILS NFR BLD AUTO: 86 %
PLATELET # BLD AUTO: 300 10E3/UL (ref 150–450)
POTASSIUM SERPL-SCNC: 3.3 MMOL/L (ref 3.4–5.3)
PROT SERPL-MCNC: 8.1 G/DL (ref 6.4–8.3)
RBC # BLD AUTO: 4.69 10E6/UL (ref 3.8–5.2)
SODIUM SERPL-SCNC: 137 MMOL/L (ref 135–145)
WBC # BLD AUTO: 10.3 10E3/UL (ref 4–11)

## 2024-06-13 PROCEDURE — 36415 COLL VENOUS BLD VENIPUNCTURE: CPT | Performed by: FAMILY MEDICINE

## 2024-06-13 PROCEDURE — 99207 REFERRAL TO ACUTE AND DIAGNOSTIC SERVICES: CPT

## 2024-06-13 PROCEDURE — 96374 THER/PROPH/DIAG INJ IV PUSH: CPT | Performed by: FAMILY MEDICINE

## 2024-06-13 PROCEDURE — 81025 URINE PREGNANCY TEST: CPT | Performed by: FAMILY MEDICINE

## 2024-06-13 PROCEDURE — 96375 TX/PRO/DX INJ NEW DRUG ADDON: CPT | Performed by: FAMILY MEDICINE

## 2024-06-13 PROCEDURE — 85025 COMPLETE CBC W/AUTO DIFF WBC: CPT | Performed by: FAMILY MEDICINE

## 2024-06-13 PROCEDURE — 99215 OFFICE O/P EST HI 40 MIN: CPT | Mod: 25 | Performed by: FAMILY MEDICINE

## 2024-06-13 PROCEDURE — 96361 HYDRATE IV INFUSION ADD-ON: CPT | Performed by: FAMILY MEDICINE

## 2024-06-13 PROCEDURE — 99207 PR NON-BILLABLE SERV PER CHARTING: CPT

## 2024-06-13 PROCEDURE — 80053 COMPREHEN METABOLIC PANEL: CPT | Performed by: FAMILY MEDICINE

## 2024-06-13 RX ORDER — ONDANSETRON 4 MG/1
4 TABLET, ORALLY DISINTEGRATING ORAL EVERY 6 HOURS PRN
Qty: 20 TABLET | Refills: 0 | Status: SHIPPED | OUTPATIENT
Start: 2024-06-13 | End: 2024-09-13

## 2024-06-13 RX ORDER — ONDANSETRON 2 MG/ML
4 INJECTION INTRAMUSCULAR; INTRAVENOUS
Status: ACTIVE | OUTPATIENT
Start: 2024-06-13 | End: 2024-06-14

## 2024-06-13 RX ADMIN — ONDANSETRON 4 MG: 2 INJECTION INTRAMUSCULAR; INTRAVENOUS at 12:54

## 2024-06-13 RX ADMIN — ONDANSETRON 4 MG: 2 INJECTION INTRAMUSCULAR; INTRAVENOUS at 12:29

## 2024-06-13 ASSESSMENT — PAIN SCALES - GENERAL: PAINLEVEL: MODERATE PAIN (5)

## 2024-06-13 NOTE — PROGRESS NOTES
"Acute and Diagnostic Services Clinic Visit    Assessment & Plan     Dehydration  Received 2 liters LR, feeling much improved  - Comprehensive metabolic panel (BMP + Alb, Alk Phos, ALT, AST, Total. Bili, TP); Future  - CBC with platelets and differential; Future  - lactated ringers BOLUS 1,000 mL  - Comprehensive metabolic panel (BMP + Alb, Alk Phos, ALT, AST, Total. Bili, TP)  - CBC with platelets and differential  - lactated ringers BOLUS 1,000 mL    Nausea and vomiting, unspecified vomiting type  Gastritis  Uncertain etiology, may be from period as well as Wegovy  Will monitor  Follow up with Honey 1-2 weeks  Zofran and omeprazole given  - Comprehensive metabolic panel (BMP + Alb, Alk Phos, ALT, AST, Total. Bili, TP); Future  - CBC with platelets and differential; Future  - lactated ringers BOLUS 1,000 mL  - ondansetron (ZOFRAN) injection 4 mg  - Comprehensive metabolic panel (BMP + Alb, Alk Phos, ALT, AST, Total. Bili, TP)  - CBC with platelets and differential  - HCG qualitative urine; Future  - lactated ringers BOLUS 1,000 mL  - HCG qualitative urine  - ondansetron (ZOFRAN ODT) 4 MG ODT tab; Take 1 tablet (4 mg) by mouth every 6 hours as needed for nausea or vomiting  - omeprazole (PRILOSEC) 20 MG DR capsule; Take 1 capsule (20 mg) by mouth daily    45 minutes were spent doing chart review, history and exam, documentation and further activities per the note.      BMI  Estimated body mass index is 26.6 kg/m  as calculated from the following:    Height as of this encounter: 1.594 m (5' 2.75\").    Weight as of this encounter: 67.6 kg (149 lb).           No follow-ups on file.    Kyra Joshi is a 29 year old, presenting for the following health issues:  Vomiting (Nausea vomiting 4 days)    HPI     Nausea/Vomiting/Dehydration  Onset/Duration: 4 days       Nausea: YES       Vomiting:  YES       How many times in the last 24 hours:  20+ times       What is the appearance:  watery. mucus       Any blood " "present: no        Diarrhea: no        Constipation: no        Melena/dark stools: no        What has oral intake been like: minimal, cannot keep anything down       Abdominal pain: YES       Character/location/radiation:  burning, above belly button  Risks       Recent travel: no        Exposures to ill contacts: no        Recent antibiotics past 2 months: no        Other: none  Progression of symptoms: same and constant  Accompanying signs and symptoms:       Fever/chills: no        Gas/bloating: YES       Dysuria or hematuria: no        Other symptoms: No  Therapies tried and outcome: sips of water, ate a piece of bread 3 days ago    Usually gets some nausea with her period, started as usual with this period, with nausea and vomiting but has been very excessive, vomiting multiple times an hour, has not been able to keep any fluids down, will vomit them up. Has been for 4 days, usually lasts a day or so. No diarrhea.   No fever or chills. No NSAID use. Some pain across epigastric area.   Is on Wegovy since end of April.   Some nausea and vomiting in March.   Having daily bowel movement   Is attempting to conceive. Has PCOS.     Review of Systems  No shortness of breath or chest pain. ROS: 5 point ROS negative except as noted above in HPI, including Gen., Resp., CV, GI &  system review.       Objective    BP (!) 149/91 (BP Location: Left arm, Patient Position: Sitting, Cuff Size: Adult Regular)   Pulse 74   Temp 98.7  F (37.1  C) (Oral)   Resp 14   Ht 1.594 m (5' 2.75\")   Wt 67.6 kg (149 lb)   LMP 06/13/2024   SpO2 100%   BMI 26.60 kg/m    Body mass index is 26.6 kg/m .  Physical Exam   GENERAL: alert and no distress  Johnson: dry membranes  NECK: no adenopathy, no asymmetry, masses, or scars  RESP: lungs clear to auscultation - no rales, rhonchi or wheezes  CV: regular rate and rhythm, normal S1 S2, no S3 or S4, no murmur, click or rub, no peripheral edema  ABDOMEN: soft, mild tenderness epigastric region, " no guarding or rebound or peritoneal signs, no hepatosplenomegaly, no masses and bowel sounds normal  MS: no gross musculoskeletal defects noted, no edema    Results for orders placed or performed in visit on 06/13/24 (from the past 24 hour(s))   Comprehensive metabolic panel (BMP + Alb, Alk Phos, ALT, AST, Total. Bili, TP)   Result Value Ref Range    Sodium 137 135 - 145 mmol/L    Potassium 3.3 (L) 3.4 - 5.3 mmol/L    Carbon Dioxide (CO2) 18 (L) 22 - 29 mmol/L    Anion Gap 22 (H) 7 - 15 mmol/L    Urea Nitrogen 12.6 6.0 - 20.0 mg/dL    Creatinine 0.86 0.51 - 0.95 mg/dL    GFR Estimate >90 >60 mL/min/1.73m2    Calcium 9.8 8.6 - 10.0 mg/dL    Chloride 97 (L) 98 - 107 mmol/L    Glucose 76 70 - 99 mg/dL    Alkaline Phosphatase 66 40 - 150 U/L    AST 17 0 - 45 U/L    ALT 14 0 - 50 U/L    Protein Total 8.1 6.4 - 8.3 g/dL    Albumin 4.7 3.5 - 5.2 g/dL    Bilirubin Total 0.4 <=1.2 mg/dL   CBC with platelets and differential    Narrative    The following orders were created for panel order CBC with platelets and differential.  Procedure                               Abnormality         Status                     ---------                               -----------         ------                     CBC with platelets and d...[[id number]]  Abnormal            Final result                 Please view results for these tests on the individual orders.   CBC with platelets and differential   Result Value Ref Range    WBC Count 10.3 4.0 - 11.0 10e3/uL    RBC Count 4.69 3.80 - 5.20 10e6/uL    Hemoglobin 14.2 11.7 - 15.7 g/dL    Hematocrit 41.4 35.0 - 47.0 %    MCV 88 78 - 100 fL    MCH 30.3 26.5 - 33.0 pg    MCHC 34.3 31.5 - 36.5 g/dL    RDW 11.7 10.0 - 15.0 %    Platelet Count 300 150 - 450 10e3/uL    % Neutrophils 86 %    % Lymphocytes 9 %    % Monocytes 5 %    % Eosinophils 0 %    % Basophils 0 %    % Immature Granulocytes 0 %    Absolute Neutrophils 8.8 (H) 1.6 - 8.3 10e3/uL    Absolute Lymphocytes 1.0 0.8 - 5.3 10e3/uL     Absolute Monocytes 0.5 0.0 - 1.3 10e3/uL    Absolute Eosinophils 0.0 0.0 - 0.7 10e3/uL    Absolute Basophils 0.0 0.0 - 0.2 10e3/uL    Absolute Immature Granulocytes 0.0 <=0.4 10e3/uL   HCG qualitative urine   Result Value Ref Range    hCG Urine Qualitative Negative Negative           Signed Electronically by: Aislinn Kaur MD

## 2024-06-13 NOTE — LETTER
June 13, 2024      Re: Madelyn Quintana  [address]  [place] [address]        To Whom It May Concern:      I'm writing this letter on behalf of our patient, Madelyn Quintana.   Madelyn was seen in our ADS clinic, 6/13/2024. Please excuse from work 6/13/2024 and 6/14/2024.     It is my pleasure to participate in Madelyn's health care needs. Please feel free to call if any questions or concerns.       Sincerely,        Aislinn Kaur

## 2024-06-13 NOTE — PROGRESS NOTES
URGENT CARE  Assessment & Plan   Assessment:   Madelyn Quintana is a 29 year old female who's clinical presentation today is consistent with:   1. Nausea and vomiting, unspecified vomiting type  Plan:  Sending patient to the ADS for higher level of care, evaluation and treatment. Patient is at risk for dehydration and would benefit from IV rehydration, antiemetics and monitoring. ADS provider Natasha accepts patient and patient agrees to this plan, she wants to self drive     NATHANAEL Rod [facility] CARE [place]    ______________________________________________________________________      Subjective     HPI: Madelyn Quintana  is a 29 year old  female who presents today for evaluation the following concerns:   Patient presents for nausea/vomiting for the past 4 days. She reports onset of menstruation this week, declines being pregnant, says flow is heavy to normal, not spotting.    Patient has been unable to keep fluids down and feels fatigued. Patient endorses decreased and dark urine output.   Denies feelings of lightheadedness   Patient states bms have been normal and daily, endorses passing gas   Denies fevers or diarrhea   Denies any blood in the emesis     Review of Systems:  Pertinent review of systems as reflected in HPI, otherwise negative.     Objective    Physical Exam:  Vitals:    06/13/24 1006   BP: (!) 141/81   Pulse: 69   Resp: 16   Temp: 98.7  F (37.1  C)   TempSrc: Tympanic   SpO2: 100%   Weight: 67.6 kg (149 lb)      General:   alert and oriented, no acute distress, non ill-appearing   Vital signs reviewed: afebrile and normotensive    SKIN: intact  EYES: Conjunctiva: clear   NOSE:  mucosa dry    MOUTH/THROAT: lips, tongue, & oral mucosa appear normal upon inspection   LUNG: normal work of breathing, good respiratory effort without retractions, good air  movement, non labored, inspection reveals normal chest expansion w/ inspiration   ABD: Bowel sounds hypoactive in all   quadrants   soft,  non-distended   nonTender to palpation    No rebound tenderness appreciated   No tympany, no organomegaly, no masses palpable,   Non-tense, no guarding present, no rigidity present,   No hernias noted, no enlarged inguinal notes, no ascites present       Referral to Acute and Diagnostic Services    [phone] ([place]) [place] - [address], [place] [address]    Transition to Acute & Diagnostic Services Clinic has been discussed with patient, and she agrees with next level of care.   Patient understands that evaluation/treatment at [facility] typically takes significantly longer than in clinic/urgent care (>2 hours).  The [facility] Acute and Diagnostics Services Clinic has been contacted by provider/staff to confirm patient acceptance.                        The following provider has assessed this patient for intervention at [facility], and directed the patient for referral: NATHANAEL Rod CNP                  ______________________________________________________________________    I explained my diagnostic considerations and recommendations to the patient, who voiced understanding and agreement with the treatment plan.   All questions were answered.   We discussed potential side effects, risks and benefits of any prescribed or recommended therapies, as well as expectations for response to treatments.  Please see AVS for any patient instructions & handouts given.   Patient was advised to contact the Nurse Care Line, their Primary Care provider, Urgent Care, or the Emergency Department if there are new or worsening symptoms, or call 911 for emergencies.

## 2024-06-13 NOTE — PROGRESS NOTES
Madelyn is a 29 year old female who presents for a billable video visit.    ASSESSMENT/PLAN:    ICD-10-CM    1. Nausea and vomiting, unspecified vomiting type  R11.2           Madelyn Quintana is a 29 year old who presents for nausea/vomiting for the past 4 days.  She reports onset of periods this week.  Patient has been unable to keep fluids down and feels fatigued.  I have advised that she be seen in person for evaluation.    No charge for today's assessment.        Video-Visit Details    Type of service:  Video Visit  Video Start Time: 9:14 AM  Video End Time: 9:19 AM    Originating Location (pt. Location): Home    Distant Location (provider location):  [facility]     Platform used for Video Visit: SammWell

## 2024-06-20 ENCOUNTER — MYC REFILL (OUTPATIENT)
Dept: FAMILY MEDICINE | Facility: CLINIC | Age: 29
End: 2024-06-20
Payer: COMMERCIAL

## 2024-06-20 DIAGNOSIS — E66.811 CLASS 1 OBESITY DUE TO EXCESS CALORIES WITH SERIOUS COMORBIDITY AND BODY MASS INDEX (BMI) OF 32.0 TO 32.9 IN ADULT: ICD-10-CM

## 2024-06-20 DIAGNOSIS — E66.09 CLASS 1 OBESITY DUE TO EXCESS CALORIES WITH SERIOUS COMORBIDITY AND BODY MASS INDEX (BMI) OF 32.0 TO 32.9 IN ADULT: ICD-10-CM

## 2024-06-20 DIAGNOSIS — F33.0 MAJOR DEPRESSIVE DISORDER, RECURRENT EPISODE, MILD (H): ICD-10-CM

## 2024-06-20 DIAGNOSIS — F33.1 MODERATE EPISODE OF RECURRENT MAJOR DEPRESSIVE DISORDER (H): ICD-10-CM

## 2024-06-20 NOTE — TELEPHONE ENCOUNTER
Pending Prescriptions:                       Disp   Refills    traZODone (DESYREL) 100 MG tablet         90 tab*3            Sig: Take 1 tablet (100 mg) by mouth at bedtime    Routing refill request to provider for review/approval because:  PHQ-9 score less than 5 in past 6 months.         Juanpablo Gonzalez RN

## 2024-06-20 NOTE — TELEPHONE ENCOUNTER
Pending Prescriptions:                       Disp   Refills    Semaglutide-Weight Management (WEGOVY) 1.*3 mL   2            Sig: Inject 1.7 mg Subcutaneous once a week    Routing refill request to provider for review/approval because:  Pt requesting refill of 1mg      Juanpablo Gonzalez RN

## 2024-06-21 ENCOUNTER — OFFICE VISIT (OUTPATIENT)
Dept: FAMILY MEDICINE | Facility: CLINIC | Age: 29
End: 2024-06-21
Payer: COMMERCIAL

## 2024-06-21 VITALS
RESPIRATION RATE: 18 BRPM | HEIGHT: 63 IN | OXYGEN SATURATION: 99 % | DIASTOLIC BLOOD PRESSURE: 62 MMHG | TEMPERATURE: 97.1 F | BODY MASS INDEX: 26.4 KG/M2 | HEART RATE: 90 BPM | WEIGHT: 149 LBS | SYSTOLIC BLOOD PRESSURE: 98 MMHG

## 2024-06-21 DIAGNOSIS — E87.6 HYPOKALEMIA: ICD-10-CM

## 2024-06-21 DIAGNOSIS — Z23 NEED FOR VACCINATION: ICD-10-CM

## 2024-06-21 DIAGNOSIS — K29.01 GASTROINTESTINAL HEMORRHAGE ASSOCIATED WITH ACUTE GASTRITIS: Primary | ICD-10-CM

## 2024-06-21 LAB
ANION GAP SERPL CALCULATED.3IONS-SCNC: 12 MMOL/L (ref 7–15)
BUN SERPL-MCNC: 12.2 MG/DL (ref 6–20)
CALCIUM SERPL-MCNC: 10 MG/DL (ref 8.6–10)
CHLORIDE SERPL-SCNC: 99 MMOL/L (ref 98–107)
CREAT SERPL-MCNC: 0.87 MG/DL (ref 0.51–0.95)
DEPRECATED HCO3 PLAS-SCNC: 25 MMOL/L (ref 22–29)
EGFRCR SERPLBLD CKD-EPI 2021: >90 ML/MIN/1.73M2
GLUCOSE SERPL-MCNC: 76 MG/DL (ref 70–99)
POTASSIUM SERPL-SCNC: 3.8 MMOL/L (ref 3.4–5.3)
SODIUM SERPL-SCNC: 136 MMOL/L (ref 135–145)

## 2024-06-21 PROCEDURE — 90471 IMMUNIZATION ADMIN: CPT | Performed by: NURSE PRACTITIONER

## 2024-06-21 PROCEDURE — 90677 PCV20 VACCINE IM: CPT | Performed by: NURSE PRACTITIONER

## 2024-06-21 PROCEDURE — 99213 OFFICE O/P EST LOW 20 MIN: CPT | Mod: 25 | Performed by: NURSE PRACTITIONER

## 2024-06-21 PROCEDURE — 80048 BASIC METABOLIC PNL TOTAL CA: CPT | Performed by: NURSE PRACTITIONER

## 2024-06-21 PROCEDURE — 36415 COLL VENOUS BLD VENIPUNCTURE: CPT | Performed by: NURSE PRACTITIONER

## 2024-06-21 ASSESSMENT — PAIN SCALES - GENERAL: PAINLEVEL: NO PAIN (0)

## 2024-06-21 NOTE — PROGRESS NOTES
"  Assessment & Plan     Gastrointestinal hemorrhage associated with acute gastritis  Patient evaluated in [facility] approximate 1 week ago for dehydration with nausea and vomiting.  Received IV fluids, Zofran and omeprazole with improvement.  Lab work unremarkable.  Patient has not had an emesis x 2 days, feeling much better back to normal.  Potassium was slightly low, will recheck today.  Otherwise, advised patient to increase diet slowly as tolerated.  Continue to stay well-hydrated.  Follow-up if symptoms recur.  - Basic metabolic panel  (Ca, Cl, CO2, Creat, Gluc, K, Na, BUN); Future    Hypokalemia  Recheck labs.  - Basic metabolic panel  (Ca, Cl, CO2, Creat, Gluc, K, Na, BUN); Future    Need for vaccination  Pneumonia vaccine          BMI  Estimated body mass index is 26.6 kg/m  as calculated from the following:    Height as of this encounter: 1.594 m (5' 2.75\").    Weight as of this encounter: 67.6 kg (149 lb).   Weight management plan: Discussed healthy diet and exercise guidelines      See Patient Instructions    Kyra Joshi is a 29 year old, presenting for the following health issues:  RECHECK        6/21/2024     2:18 PM   Additional Questions   Roomed by Iram OSMAN   Accompanied by self         6/21/2024     2:18 PM   Patient Reported Additional Medications   Patient reports taking the following new medications no new meds     History of Present Illness       Reason for visit:  Follow up fron sickness    She eats 2-3 servings of fruits and vegetables daily.She consumes 1 sweetened beverage(s) daily.She exercises with enough effort to increase her heart rate 20 to 29 minutes per day.  She exercises with enough effort to increase her heart rate 5 days per week.   She is taking medications regularly.     ED/UC Followup:    Facility:  [facility]  Date of visit: 06/13/24  Reason for visit: Dehydration and vomiting   Current Status: Pt reports feeling better now, no more abdominal pain, no " "more vomiting. Pt reports vomiting stopped yesterday. Has been a couple of days since vomiting.         Review of Systems  Constitutional, HEENT, cardiovascular, pulmonary, gi and gu systems are negative, except as otherwise noted.      Objective    BP 98/62   Pulse 90   Temp 97.1  F (36.2  C) (Tympanic)   Resp 18   Ht 1.594 m (5' 2.75\")   Wt 67.6 kg (149 lb)   LMP 06/13/2024   SpO2 99%   BMI 26.60 kg/m    Body mass index is 26.6 kg/m .  Physical Exam   GENERAL: alert and no distress  NECK: no adenopathy, no asymmetry, masses, or scars  RESP: lungs clear to auscultation - no rales, rhonchi or wheezes  CV: regular rate and rhythm, normal S1 S2, no S3 or S4, no murmur, click or rub, no peripheral edema  ABDOMEN: soft, nontender, no hepatosplenomegaly, no masses and bowel sounds normal  MS: no gross musculoskeletal defects noted, no edema  PSYCH: mentation appears normal, affect normal/bright    Diagnostic Test Results:  Labs reviewed in Epic  Pending          Signed Electronically by: Goran Handy DNP,, APRN CNP      Chart documentation with Dragon Voice recognition Software. Although reviewed after completion, some words and grammatical errors may remain.   "

## 2024-06-24 RX ORDER — TRAZODONE HYDROCHLORIDE 100 MG/1
100 TABLET ORAL AT BEDTIME
Qty: 90 TABLET | Refills: 3 | Status: SHIPPED | OUTPATIENT
Start: 2024-06-24

## 2024-07-26 ENCOUNTER — PATIENT OUTREACH (OUTPATIENT)
Dept: OBGYN | Facility: CLINIC | Age: 29
End: 2024-07-26
Payer: COMMERCIAL

## 2024-07-26 NOTE — TELEPHONE ENCOUNTER
Panel Management Review    Health Maintenance List    Health Maintenance   Topic Date Due    ASTHMA ACTION PLAN  Never done    ADVANCE CARE PLANNING  Never done    COVID-19 Vaccine (3 - Moderna risk series) 10/10/2021    YEARLY PREVENTIVE VISIT  04/23/2022    PAP FOLLOW-UP  04/23/2024    HPV FOLLOW-UP  04/23/2024    ASTHMA CONTROL TEST  09/01/2024    INFLUENZA VACCINE (1) 09/01/2024    PHQ-9  10/24/2024    DTAP/TDAP/TD IMMUNIZATION (8 - Td or Tdap) 10/26/2028    HEPATITIS C SCREENING  Completed    HIV SCREENING  Completed    DEPRESSION ACTION PLAN  Completed    Pneumococcal Vaccine: Pediatrics (0 to 5 Years) and At-Risk Patients (6 to 64 Years)  Completed    IPV IMMUNIZATION  Completed    HPV IMMUNIZATION  Completed    HEPATITIS B IMMUNIZATION  Completed    MENINGITIS IMMUNIZATION  Aged Out    RSV MONOCLONAL ANTIBODY  Aged Out    CHLAMYDIA SCREENING  Discontinued    PAP  Discontinued       Composite cancer screening  Chart review shows that this patient is due/due soon for the following Pap Smear  Lab Results   Component Value Date    PAP NIL 08/15/2017     No past surgical history on file.    Is hysterectomy listed in surgical history? No   Is mastectomy listed in surgical history? No     Summary:    Patient is due/failing the following:   Pap Smear    Action needed: Patient needs office visit for Pap.    Type of outreach:  Sent [employer] message.      Staff Signature:  Lexi Arriaza LPN

## 2024-08-22 ENCOUNTER — OFFICE VISIT (OUTPATIENT)
Dept: PEDIATRICS | Facility: CLINIC | Age: 29
End: 2024-08-22
Payer: COMMERCIAL

## 2024-08-22 ENCOUNTER — MYC MEDICAL ADVICE (OUTPATIENT)
Dept: FAMILY MEDICINE | Facility: CLINIC | Age: 29
End: 2024-08-22

## 2024-08-22 ENCOUNTER — HOSPITAL ENCOUNTER (OUTPATIENT)
Dept: CT IMAGING | Facility: CLINIC | Age: 29
Discharge: HOME | End: 2024-08-22
Attending: FAMILY MEDICINE | Admitting: FAMILY MEDICINE
Payer: COMMERCIAL

## 2024-08-22 VITALS
SYSTOLIC BLOOD PRESSURE: 136 MMHG | OXYGEN SATURATION: 100 % | HEART RATE: 79 BPM | RESPIRATION RATE: 16 BRPM | DIASTOLIC BLOOD PRESSURE: 85 MMHG | WEIGHT: 149 LBS | HEIGHT: 63 IN | BODY MASS INDEX: 26.4 KG/M2 | TEMPERATURE: 98.6 F

## 2024-08-22 DIAGNOSIS — R19.7 DIARRHEA, UNSPECIFIED TYPE: ICD-10-CM

## 2024-08-22 DIAGNOSIS — R10.13 ABDOMINAL PAIN, EPIGASTRIC: ICD-10-CM

## 2024-08-22 DIAGNOSIS — R10.11 RUQ ABDOMINAL PAIN: ICD-10-CM

## 2024-08-22 DIAGNOSIS — R11.2 NAUSEA AND VOMITING, UNSPECIFIED VOMITING TYPE: Primary | ICD-10-CM

## 2024-08-22 DIAGNOSIS — K52.9 NON-SPECIFIC COLITIS: ICD-10-CM

## 2024-08-22 DIAGNOSIS — R11.2 NAUSEA AND VOMITING, UNSPECIFIED VOMITING TYPE: ICD-10-CM

## 2024-08-22 DIAGNOSIS — E87.6 HYPOKALEMIA: ICD-10-CM

## 2024-08-22 LAB
ALBUMIN SERPL BCG-MCNC: 4.8 G/DL (ref 3.5–5.2)
ALBUMIN UR-MCNC: 30 MG/DL
ALP SERPL-CCNC: 62 U/L (ref 40–150)
ALT SERPL W P-5'-P-CCNC: 15 U/L (ref 0–50)
ANION GAP SERPL CALCULATED.3IONS-SCNC: 16 MMOL/L (ref 7–15)
APPEARANCE UR: CLEAR
AST SERPL W P-5'-P-CCNC: 21 U/L (ref 0–45)
BACTERIA #/AREA URNS HPF: ABNORMAL /HPF
BASOPHILS # BLD AUTO: 0 10E3/UL (ref 0–0.2)
BASOPHILS NFR BLD AUTO: 0 %
BILIRUB SERPL-MCNC: 0.4 MG/DL
BILIRUB UR QL STRIP: ABNORMAL
BUN SERPL-MCNC: 14.3 MG/DL (ref 6–20)
CALCIUM SERPL-MCNC: 10.1 MG/DL (ref 8.8–10.4)
CHLORIDE SERPL-SCNC: 97 MMOL/L (ref 98–107)
COLOR UR AUTO: YELLOW
CREAT SERPL-MCNC: 0.96 MG/DL (ref 0.51–0.95)
EGFRCR SERPLBLD CKD-EPI 2021: 82 ML/MIN/1.73M2
EOSINOPHIL # BLD AUTO: 0 10E3/UL (ref 0–0.7)
EOSINOPHIL NFR BLD AUTO: 0 %
ERYTHROCYTE [DISTWIDTH] IN BLOOD BY AUTOMATED COUNT: 12.1 % (ref 10–15)
GLUCOSE SERPL-MCNC: 85 MG/DL (ref 70–99)
GLUCOSE UR STRIP-MCNC: NEGATIVE MG/DL
HCG UR QL: NEGATIVE
HCO3 SERPL-SCNC: 25 MMOL/L (ref 22–29)
HCT VFR BLD AUTO: 42.2 % (ref 35–47)
HGB BLD-MCNC: 14.3 G/DL (ref 11.7–15.7)
HGB UR QL STRIP: NEGATIVE
IMM GRANULOCYTES # BLD: 0 10E3/UL
IMM GRANULOCYTES NFR BLD: 0 %
KETONES UR STRIP-MCNC: >=80 MG/DL
LEUKOCYTE ESTERASE UR QL STRIP: NEGATIVE
LYMPHOCYTES # BLD AUTO: 1.1 10E3/UL (ref 0.8–5.3)
LYMPHOCYTES NFR BLD AUTO: 11 %
MCH RBC QN AUTO: 30.6 PG (ref 26.5–33)
MCHC RBC AUTO-ENTMCNC: 33.9 G/DL (ref 31.5–36.5)
MCV RBC AUTO: 90 FL (ref 78–100)
MONOCYTES # BLD AUTO: 0.6 10E3/UL (ref 0–1.3)
MONOCYTES NFR BLD AUTO: 6 %
MUCOUS THREADS #/AREA URNS LPF: PRESENT /LPF
NEUTROPHILS # BLD AUTO: 7.8 10E3/UL (ref 1.6–8.3)
NEUTROPHILS NFR BLD AUTO: 82 %
NITRATE UR QL: NEGATIVE
PH UR STRIP: 6.5 [PH] (ref 5–7)
PLATELET # BLD AUTO: 277 10E3/UL (ref 150–450)
POTASSIUM SERPL-SCNC: 3.2 MMOL/L (ref 3.4–5.3)
PROT SERPL-MCNC: 8.1 G/DL (ref 6.4–8.3)
RBC # BLD AUTO: 4.68 10E6/UL (ref 3.8–5.2)
RBC #/AREA URNS AUTO: ABNORMAL /HPF
SODIUM SERPL-SCNC: 138 MMOL/L (ref 135–145)
SP GR UR STRIP: 1.02 (ref 1–1.03)
SQUAMOUS #/AREA URNS AUTO: ABNORMAL /LPF
UROBILINOGEN UR STRIP-ACNC: 0.2 E.U./DL
WBC # BLD AUTO: 9.4 10E3/UL (ref 4–11)
WBC #/AREA URNS AUTO: ABNORMAL /HPF

## 2024-08-22 PROCEDURE — 99417 PROLNG OP E/M EACH 15 MIN: CPT | Performed by: FAMILY MEDICINE

## 2024-08-22 PROCEDURE — 80053 COMPREHEN METABOLIC PANEL: CPT | Performed by: FAMILY MEDICINE

## 2024-08-22 PROCEDURE — 96375 TX/PRO/DX INJ NEW DRUG ADDON: CPT | Performed by: FAMILY MEDICINE

## 2024-08-22 PROCEDURE — 81001 URINALYSIS AUTO W/SCOPE: CPT | Performed by: FAMILY MEDICINE

## 2024-08-22 PROCEDURE — 36415 COLL VENOUS BLD VENIPUNCTURE: CPT | Performed by: FAMILY MEDICINE

## 2024-08-22 PROCEDURE — 250N000009 HC RX 250: Performed by: FAMILY MEDICINE

## 2024-08-22 PROCEDURE — 85025 COMPLETE CBC W/AUTO DIFF WBC: CPT | Performed by: FAMILY MEDICINE

## 2024-08-22 PROCEDURE — 74177 CT ABD & PELVIS W/CONTRAST: CPT

## 2024-08-22 PROCEDURE — 96374 THER/PROPH/DIAG INJ IV PUSH: CPT | Performed by: FAMILY MEDICINE

## 2024-08-22 PROCEDURE — 96361 HYDRATE IV INFUSION ADD-ON: CPT | Performed by: FAMILY MEDICINE

## 2024-08-22 PROCEDURE — 96366 THER/PROPH/DIAG IV INF ADDON: CPT | Performed by: FAMILY MEDICINE

## 2024-08-22 PROCEDURE — 250N000011 HC RX IP 250 OP 636: Performed by: FAMILY MEDICINE

## 2024-08-22 PROCEDURE — 81025 URINE PREGNANCY TEST: CPT | Performed by: FAMILY MEDICINE

## 2024-08-22 PROCEDURE — 99215 OFFICE O/P EST HI 40 MIN: CPT | Mod: 25 | Performed by: FAMILY MEDICINE

## 2024-08-22 RX ORDER — BUDESONIDE 3 MG/1
9 CAPSULE, COATED PELLETS ORAL EVERY MORNING
Qty: 90 CAPSULE | Refills: 0 | Status: SHIPPED | OUTPATIENT
Start: 2024-08-22

## 2024-08-22 RX ORDER — ONDANSETRON 4 MG/1
4 TABLET, ORALLY DISINTEGRATING ORAL EVERY 8 HOURS PRN
Qty: 12 TABLET | Refills: 0 | Status: SHIPPED | OUTPATIENT
Start: 2024-08-22 | End: 2024-09-13

## 2024-08-22 RX ORDER — ONDANSETRON 2 MG/ML
4 INJECTION INTRAMUSCULAR; INTRAVENOUS
Status: ACTIVE | OUTPATIENT
Start: 2024-08-22 | End: 2024-08-23

## 2024-08-22 RX ORDER — KETOROLAC TROMETHAMINE 30 MG/ML
15 INJECTION, SOLUTION INTRAMUSCULAR; INTRAVENOUS ONCE
Status: COMPLETED | OUTPATIENT
Start: 2024-08-22 | End: 2024-08-22

## 2024-08-22 RX ORDER — POTASSIUM CHLORIDE 7.45 MG/ML
10 INJECTION INTRAVENOUS ONCE
Status: COMPLETED | OUTPATIENT
Start: 2024-08-22 | End: 2024-08-22

## 2024-08-22 RX ORDER — IOPAMIDOL 755 MG/ML
72 INJECTION, SOLUTION INTRAVASCULAR ONCE
Status: COMPLETED | OUTPATIENT
Start: 2024-08-22 | End: 2024-08-22

## 2024-08-22 RX ADMIN — KETOROLAC TROMETHAMINE 15 MG: 30 INJECTION, SOLUTION INTRAMUSCULAR; INTRAVENOUS at 17:08

## 2024-08-22 RX ADMIN — POTASSIUM CHLORIDE 10 MEQ: 7.45 INJECTION INTRAVENOUS at 17:13

## 2024-08-22 RX ADMIN — SODIUM CHLORIDE 58 ML: 9 INJECTION, SOLUTION INTRAVENOUS at 15:01

## 2024-08-22 RX ADMIN — ONDANSETRON 4 MG: 2 INJECTION INTRAMUSCULAR; INTRAVENOUS at 17:09

## 2024-08-22 RX ADMIN — IOPAMIDOL 72 ML: 755 INJECTION, SOLUTION INTRAVENOUS at 15:00

## 2024-08-22 RX ADMIN — Medication 1000 ML: at 17:05

## 2024-08-22 ASSESSMENT — PAIN SCALES - GENERAL: PAINLEVEL: SEVERE PAIN (6)

## 2024-08-22 NOTE — TELEPHONE ENCOUNTER
Patient warm call transferred to writer to triage for possible dehydration symptoms (see my chart):    S-(situation): vomiting, decreased urination    B-(background): started 3 days ago, had same thing happen past June and had to go to [facility]    A-(assessment): patient reporting she gets very ill at start of period that started 3 days ago, she says she is vomiting 20 plus times per day past 3 days, not able to keep any fluids in, feels lightheaded, feels clammy and cold, stomach hurts above the belly button that comes in waves and says is like a sharp pain. Urinating twice per day once in am and once at bedtime. Attempting to keep fluids in but then vomits everything back up, attempted to eat yesterday and vomiting back up.     R-(recommendations): patient is told RN will discuss with [facility] care team and call her back.    Called [facility] and spoke to Jana who says yes provider in [facility] will see patient today, Jana will call to schedule her now.    Will send update only to Honey Foley for review, no need for second level triage.      ROGELIO Raines

## 2024-08-22 NOTE — Clinical Note
CJI-patient seen at [facility] today.  Diagnosed with colitis.  Stool studies are pending.  Patient will need to follow-up with you in a couple of weeks.

## 2024-08-22 NOTE — PATIENT INSTRUCTIONS
if possible collect stool samples before starting the oral steroids but it is okay to still collect them if you are unable to do that.    Zofran ODT 1 tablet every 8 hours as needed for nausea    Try to push fluids  Stay home until you are feeling better  Follow-up in clinic or emergency room if you are feeling worse

## 2024-08-22 NOTE — LETTER
August 22, 2024      Madelyn Quintana  [address] [facility] [address]        To Whom It May Concern:    Madelyn Quintana  was seen on 08/22/24.  Please excuse her from work until feeling better due to illness.        Sincerely,        Melisa Quan MD

## 2024-08-22 NOTE — PROGRESS NOTES
[facility] Clinic Visit    Assessment & Plan     Non-specific colitis  Patient given Toradol 15 mg IV x 1 and Zofran 4 mg IV x 1 here in clinic which gave her some temporary relief  Ordered stool studies and these are pending collection  Prescribed budesonide to be taken 9 mg daily  Note done for work  Zofran every 8 hours as needed for nausea  Follow-up order placed for primary care  Referral placed for GI  Recommended:  Try to push fluids  Stay home until you are feeling better  Follow-up in clinic or emergency room if you are feeling worse  - Enteric Bacteria and Virus Panel by ALPHONSE Stool; Future  - C. difficile Toxin B PCR with reflex to C. difficile Antigen and Toxins A/B EIA; Future  - Calprotectin Feces; Future  - ondansetron (ZOFRAN ODT) 4 MG ODT tab; Take 1 tablet (4 mg) by mouth every 8 hours as needed for nausea.  - Adult GI  Referral - Consult Only; Future  - budesonide (ENTOCORT EC) 3 MG EC capsule; Take 3 capsules (9 mg) by mouth every morning.    Abdominal pain, epigastric  See above  - sodium chloride (PF) 0.9% PF flush 3 mL  - CT Abdomen Pelvis w Contrast; Future  - CBC with platelets differential; Future  - Comprehensive metabolic panel; Future  - HCG qualitative urine; Future  - UA Macroscopic with reflex to Microscopic and Culture - Clinic Collect  - sodium chloride 0.9% BOLUS 1,000 mL  - ketorolac (TORADOL) injection 15 mg  - ondansetron (ZOFRAN) injection 4 mg  - CBC with platelets differential  - Comprehensive metabolic panel  - HCG qualitative urine  - UA Microscopic with Reflex to Culture    RUQ abdominal pain  See above  - sodium chloride (PF) 0.9% PF flush 3 mL  - CT Abdomen Pelvis w Contrast; Future  - CBC with platelets differential; Future  - Comprehensive metabolic panel; Future  - HCG qualitative urine; Future  - UA Macroscopic with reflex to Microscopic and Culture - Clinic Collect  - sodium chloride 0.9% BOLUS 1,000 mL  - ketorolac (TORADOL) injection 15  mg  - ondansetron (ZOFRAN) injection 4 mg  - CBC with platelets differential  - Comprehensive metabolic panel  - HCG qualitative urine  - UA Microscopic with Reflex to Culture    Nausea and vomiting, unspecified vomiting type  Given zofran here in clinic and that helped   Prescription done for zofran ODT to use at home as needed  - sodium chloride (PF) 0.9% PF flush 3 mL  - CT Abdomen Pelvis w Contrast; Future  - CBC with platelets differential; Future  - Comprehensive metabolic panel; Future  - HCG qualitative urine; Future  - UA Macroscopic with reflex to Microscopic and Culture - Clinic Collect  - sodium chloride 0.9% BOLUS 1,000 mL  - ketorolac (TORADOL) injection 15 mg  - ondansetron (ZOFRAN) injection 4 mg  - CBC with platelets differential  - Comprehensive metabolic panel  - HCG qualitative urine  - UA Microscopic with Reflex to Culture  - ondansetron (ZOFRAN ODT) 4 MG ODT tab; Take 1 tablet (4 mg) by mouth every 8 hours as needed for nausea.  - Adult GI  Referral - Consult Only; Future  - budesonide (ENTOCORT EC) 3 MG EC capsule; Take 3 capsules (9 mg) by mouth every morning.    Hypokalemia  Patient given over half of a bag of IV potassium 10mEq and then it was stopped due to burning.    Printed information on high potassium foods  - potassium chloride 10 mEq in 100 mL sterile water infusion    Diarrhea, unspecified type  Stool studies ordered and pending - Enteric Bacteria and Virus Panel by ALPHONSE Stool; Future  - C. difficile Toxin B PCR with reflex to C. difficile Antigen and Toxins A/B EIA; Future  - ondansetron (ZOFRAN ODT) 4 MG ODT tab; Take 1 tablet (4 mg) by mouth every 8 hours as needed for nausea.  - Adult GI  Referral - Consult Only; Future  - budesonide (ENTOCORT EC) 3 MG EC capsule; Take 3 capsules (9 mg) by mouth every morning.      > 55 minutes were spent doing chart review, history and exam, documentation and further activities per the note.             No follow-ups on  file.    Kyra Joshi is a 29 year old, presenting for the following health issues:  Vomiting (NAUSEA, VOMITING) and Abdominal Pain (CENTER OF ABDOMEN ABOVE NAVEL)    HPI     Abdominal/Flank Pain  Onset/Duration: 3 DAYS  Description:   Character: Sharp and Burning  Location: hawa-umbilical region  Radiation: None  Intensity: moderate  Progression of Symptoms:  same and waxing and waning  Accompanying Signs & Symptoms:  Fever/chills: no   Gas/Bloating: YES  Nausea: YES  Vomitting: YES  Diarrhea: YES  Constipation:no   Dysuria: no            Hematuria: no            Frequency: no            Incontinence of urine: no   History:            Last bowel movement: today  Trauma: no   Previous similar pain: YES   Previous tests done: none           Previous Abdominal surgery: no   Precipitating factors:   Does the pain change with:     Food: no      Bowel Movement: no     Urination: no              Other factors: no   Therapies tried and outcome:  None    When food last eaten: last night    Nausea/Vomiting/Dehydration  Onset/Duration: 3 days       Nausea: YES       Vomiting:  YES       How many times in the last 24 hours:  20+       What is the appearance:  Orange, thick, mucus       Any blood present: no        Diarrhea: YES       Constipation: no        Melena/dark stools: no        What has oral intake been like: Nothing       Abdominal pain: YES       Character/location/radiation:  hawa-umbilical region  Risks       Recent travel: no        Exposures to ill contacts: no        Recent antibiotics past 2 months: no        Other: None  Progression of symptoms: same and waxing and waning  Accompanying signs and symptoms:       Fever/chills: no        Gas/bloating: YES       Dysuria or hematuria: no        Other symptoms: YES- fatigue, low back pain  Therapies tried and outcome: Pepcid, Pamprin, gas x    No recent antibiotics.  No hospitalizations.  Patient is a 29-year-old female who typically gets very bad cramps  "including nausea and vomiting for 2 to 3 days with her menstrual cycles.  Her LMP was 8/18/2023.  She has not been using contraception for about 10 years but did previously avoid the nausea and vomiting with her periods when she was on contraception.  She is currently trying to get pregnant and has seen a fertility specialist a couple of times.  These episodes that seem to come with her menses have been worse over the last 6 months.  She tried Dramamine but that is not helping.    Review of Systems  negative except listed in HPI       Objective    /85 (BP Location: Left arm, Patient Position: Sitting, Cuff Size: Adult Regular)   Pulse 79   Temp 98.6  F (37  C)   Resp 16   Ht 1.594 m (5' 2.75\")   Wt 67.6 kg (149 lb)   SpO2 100%   BMI 26.60 kg/m    Body mass index is 26.6 kg/m .  Physical Exam   Vitals are noted and within normal limits  In general she is alert comforted, and in no acute distress.  She did vomit while here at the clinic.  Heart regular rate and rhythm with no murmurs  Lungs clear to auscultation bilaterally with good air entry  Back with no CVA tenderness  Abdomen with normal bowel sounds.  Soft, nondistended.  There is tenderness to palpation in the epigastric and right upper quadrant areas.  CBC with differential is within normal limits  CMP shows low potassium and a very mild acute renal insufficiency with signs of dehydration.  Normal liver functions.  UA: No sign of infection  UPT negative  CT abdomen pelvis shows a colitis of the ascending and proximal transverse colon.  Also cyst noted.  Results for orders placed or performed during the hospital encounter of 08/22/24   CT Abdomen Pelvis w Contrast     Status: None    Narrative    CT ABDOMEN AND PELVIS WITH CONTRAST 8/22/2024 3:08 PM    CLINICAL HISTORY: Abdominal pain, epigastric. Right upper quadrant  abdominal pain. Nausea and vomiting, unspecified vomiting type.    TECHNIQUE: CT scan of the abdomen and pelvis was performed " following  injection of IV contrast. Multiplanar reformats were obtained. Dose  reduction techniques were used.  CONTRAST: 72 mL Isovue-370    COMPARISON: None.    FINDINGS:   LOWER CHEST: Normal.    HEPATOBILIARY: Normal.    PANCREAS: Normal.    SPLEEN: Normal.    ADRENAL GLANDS: Normal.    KIDNEYS/BLADDER: Normal.    BOWEL: No bowel obstruction. Wall thickening and mucosal enhancement  is seen through the ascending and proximal transverse colon without  adjacent inflammatory change. This indicates underlying colitis. The  distal colon and rectum are unremarkable. The appendix is not  definitively visualized, but there is no evidence to suggest  appendicitis.    PELVIC ORGANS: No free fluid or adenopathy. A cervical cup is in  place. There is a 15 x 12 mm cyst along the perineum which likely  represents a vaginal wall cyst such as a Bartholin or skene gland  cyst.    ADDITIONAL FINDINGS: None.    MUSCULOSKELETAL: Normal.      Impression    IMPRESSION:   1.  Colitis involving the ascending and proximal transverse colon.  2.  15 mm cyst along the perineum which may represent a vaginal wall  cyst.    HARPREET BURNHAM MD         SYSTEM ID:  [id number]   Results for orders placed or performed in visit on 08/22/24   UA Macroscopic with reflex to Microscopic and Culture - Clinic Collect     Status: Abnormal    Specimen: Urine, Clean Catch   Result Value Ref Range    Color Urine Yellow Colorless, Straw, Light Yellow, Yellow    Appearance Urine Clear Clear    Glucose Urine Negative Negative mg/dL    Bilirubin Urine Small (A) Negative    Ketones Urine >=80 (A) Negative mg/dL    Specific Gravity Urine 1.025 1.003 - 1.035    Blood Urine Negative Negative    pH Urine 6.5 5.0 - 7.0    Protein Albumin Urine 30 (A) Negative mg/dL    Urobilinogen Urine 0.2 0.2, 1.0 E.U./dL    Nitrite Urine Negative Negative    Leukocyte Esterase Urine Negative Negative   Comprehensive metabolic panel     Status: Abnormal   Result Value Ref Range     Sodium 138 135 - 145 mmol/L    Potassium 3.2 (L) 3.4 - 5.3 mmol/L    Carbon Dioxide (CO2) 25 22 - 29 mmol/L    Anion Gap 16 (H) 7 - 15 mmol/L    Urea Nitrogen 14.3 6.0 - 20.0 mg/dL    Creatinine 0.96 (H) 0.51 - 0.95 mg/dL    GFR Estimate 82 >60 mL/min/1.73m2    Calcium 10.1 8.8 - 10.4 mg/dL    Chloride 97 (L) 98 - 107 mmol/L    Glucose 85 70 - 99 mg/dL    Alkaline Phosphatase 62 40 - 150 U/L    AST 21 0 - 45 U/L    ALT 15 0 - 50 U/L    Protein Total 8.1 6.4 - 8.3 g/dL    Albumin 4.8 3.5 - 5.2 g/dL    Bilirubin Total 0.4 <=1.2 mg/dL   HCG qualitative urine     Status: Normal   Result Value Ref Range    hCG Urine Qualitative Negative Negative   CBC with platelets and differential     Status: None   Result Value Ref Range    WBC Count 9.4 4.0 - 11.0 10e3/uL    RBC Count 4.68 3.80 - 5.20 10e6/uL    Hemoglobin 14.3 11.7 - 15.7 g/dL    Hematocrit 42.2 35.0 - 47.0 %    MCV 90 78 - 100 fL    MCH 30.6 26.5 - 33.0 pg    MCHC 33.9 31.5 - 36.5 g/dL    RDW 12.1 10.0 - 15.0 %    Platelet Count 277 150 - 450 10e3/uL    % Neutrophils 82 %    % Lymphocytes 11 %    % Monocytes 6 %    % Eosinophils 0 %    % Basophils 0 %    % Immature Granulocytes 0 %    Absolute Neutrophils 7.8 1.6 - 8.3 10e3/uL    Absolute Lymphocytes 1.1 0.8 - 5.3 10e3/uL    Absolute Monocytes 0.6 0.0 - 1.3 10e3/uL    Absolute Eosinophils 0.0 0.0 - 0.7 10e3/uL    Absolute Basophils 0.0 0.0 - 0.2 10e3/uL    Absolute Immature Granulocytes 0.0 <=0.4 10e3/uL   UA Microscopic with Reflex to Culture     Status: Abnormal   Result Value Ref Range    Bacteria Urine Few (A) None Seen /HPF    RBC Urine 0-2 0-2 /HPF /HPF    WBC Urine 0-5 0-5 /HPF /HPF    Squamous Epithelials Urine Few (A) None Seen /LPF    Mucus Urine Present (A) None Seen /LPF    Narrative    Urine Culture not indicated   CBC with platelets differential     Status: None    Narrative    The following orders were created for panel order CBC with platelets differential.  Procedure                                Abnormality         Status                     ---------                               -----------         ------                     CBC with platelets and d...[[id number]]                      Final result                 Please view results for these tests on the individual orders.                 Signed Electronically by: Melisa Quan MD

## 2024-08-24 ENCOUNTER — HOSPITAL ENCOUNTER (EMERGENCY)
Facility: CLINIC | Age: 29
Discharge: HOME | End: 2024-08-24
Attending: EMERGENCY MEDICINE | Admitting: EMERGENCY MEDICINE
Payer: COMMERCIAL

## 2024-08-24 ENCOUNTER — NURSE TRIAGE (OUTPATIENT)
Dept: NURSING | Facility: CLINIC | Age: 29
End: 2024-08-24
Payer: COMMERCIAL

## 2024-08-24 VITALS
RESPIRATION RATE: 18 BRPM | HEART RATE: 68 BPM | SYSTOLIC BLOOD PRESSURE: 111 MMHG | BODY MASS INDEX: 24.84 KG/M2 | WEIGHT: 135 LBS | HEIGHT: 62 IN | OXYGEN SATURATION: 99 % | TEMPERATURE: 98.2 F | DIASTOLIC BLOOD PRESSURE: 65 MMHG

## 2024-08-24 DIAGNOSIS — K52.9 COLITIS: ICD-10-CM

## 2024-08-24 DIAGNOSIS — R19.7 NAUSEA VOMITING AND DIARRHEA: ICD-10-CM

## 2024-08-24 DIAGNOSIS — R11.2 NAUSEA VOMITING AND DIARRHEA: ICD-10-CM

## 2024-08-24 DIAGNOSIS — E86.0 DEHYDRATION: ICD-10-CM

## 2024-08-24 LAB
ALBUMIN SERPL BCG-MCNC: 4.6 G/DL (ref 3.5–5.2)
ALP SERPL-CCNC: 61 U/L (ref 40–150)
ALT SERPL W P-5'-P-CCNC: 18 U/L (ref 0–50)
ANION GAP SERPL CALCULATED.3IONS-SCNC: 19 MMOL/L (ref 7–15)
AST SERPL W P-5'-P-CCNC: 25 U/L (ref 0–45)
BASOPHILS # BLD AUTO: 0 10E3/UL (ref 0–0.2)
BASOPHILS NFR BLD AUTO: 0 %
BILIRUB SERPL-MCNC: 0.5 MG/DL
BUN SERPL-MCNC: 10.1 MG/DL (ref 6–20)
CALCIUM SERPL-MCNC: 10 MG/DL (ref 8.8–10.4)
CHLORIDE SERPL-SCNC: 95 MMOL/L (ref 98–107)
CREAT SERPL-MCNC: 0.83 MG/DL (ref 0.51–0.95)
CRP SERPL-MCNC: <3 MG/L
EGFRCR SERPLBLD CKD-EPI 2021: >90 ML/MIN/1.73M2
EOSINOPHIL # BLD AUTO: 0 10E3/UL (ref 0–0.7)
EOSINOPHIL NFR BLD AUTO: 0 %
ERYTHROCYTE [DISTWIDTH] IN BLOOD BY AUTOMATED COUNT: 11.8 % (ref 10–15)
ERYTHROCYTE [SEDIMENTATION RATE] IN BLOOD BY WESTERGREN METHOD: 13 MM/HR (ref 0–20)
GLUCOSE SERPL-MCNC: 85 MG/DL (ref 70–99)
HCO3 SERPL-SCNC: 22 MMOL/L (ref 22–29)
HCT VFR BLD AUTO: 40.5 % (ref 35–47)
HGB BLD-MCNC: 14.7 G/DL (ref 11.7–15.7)
IMM GRANULOCYTES # BLD: 0 10E3/UL
IMM GRANULOCYTES NFR BLD: 0 %
LIPASE SERPL-CCNC: 15 U/L (ref 13–60)
LYMPHOCYTES # BLD AUTO: 1 10E3/UL (ref 0.8–5.3)
LYMPHOCYTES NFR BLD AUTO: 13 %
MCH RBC QN AUTO: 31.8 PG (ref 26.5–33)
MCHC RBC AUTO-ENTMCNC: 36.3 G/DL (ref 31.5–36.5)
MCV RBC AUTO: 88 FL (ref 78–100)
MONOCYTES # BLD AUTO: 0.5 10E3/UL (ref 0–1.3)
MONOCYTES NFR BLD AUTO: 7 %
NEUTROPHILS # BLD AUTO: 5.9 10E3/UL (ref 1.6–8.3)
NEUTROPHILS NFR BLD AUTO: 80 %
NRBC # BLD AUTO: 0 10E3/UL
NRBC BLD AUTO-RTO: 0 /100
PLATELET # BLD AUTO: 307 10E3/UL (ref 150–450)
POTASSIUM SERPL-SCNC: 3.3 MMOL/L (ref 3.4–5.3)
PROT SERPL-MCNC: 8.1 G/DL (ref 6.4–8.3)
RBC # BLD AUTO: 4.62 10E6/UL (ref 3.8–5.2)
SODIUM SERPL-SCNC: 136 MMOL/L (ref 135–145)
WBC # BLD AUTO: 7.4 10E3/UL (ref 4–11)

## 2024-08-24 PROCEDURE — 258N000003 HC RX IP 258 OP 636: Performed by: EMERGENCY MEDICINE

## 2024-08-24 PROCEDURE — 99284 EMERGENCY DEPT VISIT MOD MDM: CPT | Performed by: EMERGENCY MEDICINE

## 2024-08-24 PROCEDURE — 85652 RBC SED RATE AUTOMATED: CPT | Performed by: EMERGENCY MEDICINE

## 2024-08-24 PROCEDURE — 99284 EMERGENCY DEPT VISIT MOD MDM: CPT | Mod: 25

## 2024-08-24 PROCEDURE — 250N000011 HC RX IP 250 OP 636: Performed by: EMERGENCY MEDICINE

## 2024-08-24 PROCEDURE — 86140 C-REACTIVE PROTEIN: CPT | Performed by: EMERGENCY MEDICINE

## 2024-08-24 PROCEDURE — 36415 COLL VENOUS BLD VENIPUNCTURE: CPT | Performed by: EMERGENCY MEDICINE

## 2024-08-24 PROCEDURE — 80053 COMPREHEN METABOLIC PANEL: CPT | Performed by: EMERGENCY MEDICINE

## 2024-08-24 PROCEDURE — 96375 TX/PRO/DX INJ NEW DRUG ADDON: CPT

## 2024-08-24 PROCEDURE — 96374 THER/PROPH/DIAG INJ IV PUSH: CPT

## 2024-08-24 PROCEDURE — 250N000013 HC RX MED GY IP 250 OP 250 PS 637: Performed by: EMERGENCY MEDICINE

## 2024-08-24 PROCEDURE — 85025 COMPLETE CBC W/AUTO DIFF WBC: CPT | Performed by: EMERGENCY MEDICINE

## 2024-08-24 PROCEDURE — 96361 HYDRATE IV INFUSION ADD-ON: CPT

## 2024-08-24 PROCEDURE — 83690 ASSAY OF LIPASE: CPT | Performed by: EMERGENCY MEDICINE

## 2024-08-24 RX ORDER — ONDANSETRON 2 MG/ML
4 INJECTION INTRAMUSCULAR; INTRAVENOUS ONCE
Status: COMPLETED | OUTPATIENT
Start: 2024-08-24 | End: 2024-08-24

## 2024-08-24 RX ORDER — DROPERIDOL 2.5 MG/ML
2.5 INJECTION, SOLUTION INTRAMUSCULAR; INTRAVENOUS ONCE
Status: COMPLETED | OUTPATIENT
Start: 2024-08-24 | End: 2024-08-24

## 2024-08-24 RX ORDER — KETOROLAC TROMETHAMINE 15 MG/ML
15 INJECTION, SOLUTION INTRAMUSCULAR; INTRAVENOUS ONCE
Status: COMPLETED | OUTPATIENT
Start: 2024-08-24 | End: 2024-08-24

## 2024-08-24 RX ORDER — CAPSAICIN 0.025 %
CREAM (GRAM) TOPICAL 3 TIMES DAILY PRN
Status: DISCONTINUED | OUTPATIENT
Start: 2024-08-24 | End: 2024-08-24 | Stop reason: HOSPADM

## 2024-08-24 RX ORDER — OXYCODONE HYDROCHLORIDE 5 MG/1
5 TABLET ORAL EVERY 6 HOURS PRN
Qty: 10 TABLET | Refills: 0 | Status: SHIPPED | OUTPATIENT
Start: 2024-08-24 | End: 2024-09-13

## 2024-08-24 RX ORDER — SODIUM CHLORIDE 9 MG/ML
1000 INJECTION, SOLUTION INTRAVENOUS CONTINUOUS
Status: DISCONTINUED | OUTPATIENT
Start: 2024-08-24 | End: 2024-08-24 | Stop reason: HOSPADM

## 2024-08-24 RX ORDER — PROMETHAZINE HYDROCHLORIDE 25 MG/1
25 TABLET ORAL EVERY 6 HOURS PRN
Qty: 24 TABLET | Refills: 1 | Status: SHIPPED | OUTPATIENT
Start: 2024-08-24 | End: 2024-09-16

## 2024-08-24 RX ADMIN — SODIUM CHLORIDE 500 ML: 9 INJECTION, SOLUTION INTRAVENOUS at 13:14

## 2024-08-24 RX ADMIN — DROPERIDOL 2.5 MG: 2.5 INJECTION, SOLUTION INTRAMUSCULAR; INTRAVENOUS at 11:40

## 2024-08-24 RX ADMIN — SODIUM CHLORIDE 1000 ML: 9 INJECTION, SOLUTION INTRAVENOUS at 10:38

## 2024-08-24 RX ADMIN — KETOROLAC TROMETHAMINE 15 MG: 15 INJECTION, SOLUTION INTRAMUSCULAR; INTRAVENOUS at 13:14

## 2024-08-24 RX ADMIN — CAPSAICIN: 0.25 CREAM TOPICAL at 11:39

## 2024-08-24 RX ADMIN — ONDANSETRON 4 MG: 2 INJECTION INTRAMUSCULAR; INTRAVENOUS at 10:39

## 2024-08-24 RX ADMIN — SODIUM CHLORIDE 1000 ML: 9 INJECTION, SOLUTION INTRAVENOUS at 11:42

## 2024-08-24 ASSESSMENT — ACTIVITIES OF DAILY LIVING (ADL)
ADLS_ACUITY_SCORE: 37

## 2024-08-24 ASSESSMENT — COLUMBIA-SUICIDE SEVERITY RATING SCALE - C-SSRS
2. HAVE YOU ACTUALLY HAD ANY THOUGHTS OF KILLING YOURSELF IN THE PAST MONTH?: NO
6. HAVE YOU EVER DONE ANYTHING, STARTED TO DO ANYTHING, OR PREPARED TO DO ANYTHING TO END YOUR LIFE?: NO
1. IN THE PAST MONTH, HAVE YOU WISHED YOU WERE DEAD OR WISHED YOU COULD GO TO SLEEP AND NOT WAKE UP?: NO

## 2024-08-24 NOTE — ED PROVIDER NOTES
History     Chief Complaint   Patient presents with    Abdominal Pain     HPI  History per patient, review of [facility] EMR and Care Everywhere EMR,     Madelyn Quintana is a 29 year old female with recent CT diagnosis of colitis involving the ascending and proximal transverse colon 2 days ago, 8/22/2024 who presents emergency department for persistent stable poorly localized cramping sharp severe abdominal pain, and primarily due to nausea and vomiting refractory to Zofran prescribed in the [facility] clinic 2 days ago.  When she was seen there 2 days ago she had an extensive laboratory evaluation which was remarkable for mildly decreased potassium at 3.2, increased anion gap 16 and increased creatinine 0.96 with normal GFR, and UA with ketones greater than 80.  A CT was performed due to severe poor localized abdominal pain which showed the ascending and proximal transverse colon colitis.  Only 1 episode of diarrhea since yesterday, without blood or mucus.  No fever or chills.  Emesis is without signs or symptoms of GI bleeding.  She has had no known recent infectious exposures, suspicious bad food ingestion, recent travel or antibiotic use.  She has no history or known family history of inflammatory bowel disease. She was able to provide a stool specimen earlier today and stool evaluation ordered in the [facility] clinic 2 days ago is now in process, results pending,  eval: enteric bacterial and viral panel, C. difficile and fecal calprotectin.  In the [facility] clinic 2 days ago she was given IV fluids, IV Zofran, IV potassium and discharged with prescriptions for Zofran and Budesonide (? for possible inflammatory bowel disease).  A pregnancy test was negative and her LMP has been within normal limits.    CT ABDOMEN AND PELVIS WITH CONTRAST 8/22/2024 3:08 PM     CLINICAL HISTORY: Abdominal pain, epigastric. Right upper quadrant  abdominal pain. Nausea and vomiting, unspecified vomiting type.     COMPARISON:  None.     FINDINGS:    BOWEL: No bowel obstruction. Wall thickening and mucosal enhancement  is seen through the ascending and proximal transverse colon without  adjacent inflammatory change. This indicates underlying colitis. The  distal colon and rectum are unremarkable. The appendix is not  definitively visualized, but there is no evidence to suggest  appendicitis.                                                   IMPRESSION:   1.  Colitis involving the ascending and proximal transverse colon.  2.  15 mm cyst along the perineum which may represent a vaginal wall cyst.       Allergies:  Allergies   Allergen Reactions    Lactose Diarrhea     Intolerance  Other reaction(s): GI intolerance  Intolerance  Intolerance       Problem List:    Patient Active Problem List    Diagnosis Date Noted    VICTORIA (generalized anxiety disorder) 04/24/2024     Priority: Medium    Moderate episode of recurrent major depressive disorder (H) 11/08/2023     Priority: Medium    Eczema 11/08/2023     Priority: Medium    Insomnia 11/08/2023     Priority: Medium    Reactive airway disease 11/08/2023     Priority: Medium    ASCUS of cervix with negative high risk HPV 04/23/2021     Priority: Medium     2017 NIL Pap  4/23/21 ASCUS pap, Neg HPV. Plan cotest in 3 years. (abstracted)  Formatting of this note might be different from the original.   04/23/2021 ASCUS/HPV Negative.  Plan: Pap due 04/2024      Vitamin D deficiency 12/03/2015     Priority: Medium    Panic disorder with agoraphobia 12/01/2015     Priority: Medium    Generalized anxiety disorder 11/16/2015     Priority: Medium    Labial infection 08/01/2014     Priority: Medium     Probable HSV with cellulitis  Valtrex  Silvadene and lidocaine cream  clindamycin          Past Medical History:    Past Medical History:   Diagnosis Date    Abnormal Pap smear of cervix 04/23/2021    CARDIOVASCULAR SCREENING; LDL GOAL LESS THAN 160 08/01/2014       Past Surgical History:    History reviewed. No  "pertinent surgical history.    Family History:    Family History   Problem Relation Age of Onset    Unknown/Adopted Paternal Grandmother     Unknown/Adopted Paternal Grandfather     Unknown/Adopted Maternal Grandfather        Social History:  Marital Status:  Single [1]  Social History     Tobacco Use    Smoking status: Former     Types: Vaping Device     Passive exposure: Past    Smokeless tobacco: Never    Tobacco comments:     5 cigarettes a day    Vaping Use    Vaping status: Former    Substances: Nicotine, Flavoring    Devices: Disposable   Substance Use Topics    Alcohol use: Yes     Comment: social    Drug use: No        Medications:    oxyCODONE (ROXICODONE) 5 MG tablet  promethazine (PHENERGAN) 25 MG tablet  albuterol (VENTOLIN HFA) 108 (90 Base) MCG/ACT inhaler  augmented betamethasone dipropionate (DIPROLENE AF) 0.05 % external cream  budesonide (ENTOCORT EC) 3 MG EC capsule  fluocinonide (LIDEX) 0.05 % external cream  hydrOXYzine (ATARAX) 25 MG tablet  IBUPROFEN PO  LORazepam (ATIVAN) 0.5 MG tablet  omeprazole (PRILOSEC) 20 MG DR capsule  ondansetron (ZOFRAN ODT) 4 MG ODT tab  ondansetron (ZOFRAN ODT) 4 MG ODT tab  Prenatal Vit-Fe Fumarate-FA (PRENATAL MULTIVITAMIN W/IRON) 27-0.8 MG tablet  Semaglutide-Weight Management (WEGOVY) 1 MG/0.5ML pen  Semaglutide-Weight Management (WEGOVY) 1.7 MG/0.75ML pen  sertraline (ZOLOFT) 100 MG tablet  traZODone (DESYREL) 100 MG tablet      Review of Systems  As mentioned in the HPI, in addition focused review of systems was negative.     Physical Exam   BP: (!) 141/86  Pulse: 76  Temp: 98.2  F (36.8  C)  Resp: 18  Height: 157.5 cm (5' 2\")  Weight: 61.2 kg (135 lb)  SpO2: 100 %      Physical Exam  Vitals and nursing note reviewed.   Constitutional:       General: She is in acute distress.      Appearance: Normal appearance. She is well-developed. She is ill-appearing. She is not diaphoretic.   HENT:      Mouth/Throat:      Mouth: Mucous membranes are dry.   Eyes:      " General: No scleral icterus.     Extraocular Movements: Extraocular movements intact.      Conjunctiva/sclera: Conjunctivae normal.   Neck:      Trachea: No tracheal deviation.   Cardiovascular:      Rate and Rhythm: Normal rate and regular rhythm.      Heart sounds: Normal heart sounds. No murmur heard.     No friction rub. No gallop.   Pulmonary:      Effort: Pulmonary effort is normal. No respiratory distress.      Breath sounds: Normal breath sounds. No wheezing, rhonchi or rales.   Abdominal:      General: Bowel sounds are normal. There is no distension or abdominal bruit.      Palpations: Abdomen is soft. There is no mass or pulsatile mass.      Tenderness: There is abdominal tenderness (Poorly localized diffuse upper mid abdominal pain as diagrammed). There is no guarding or rebound. Negative signs include Barreto's sign and McBurney's sign.      Hernia: No hernia is present.       Musculoskeletal:         General: Normal range of motion.      Cervical back: Normal range of motion and neck supple.      Right lower leg: No edema.      Left lower leg: No edema.   Skin:     General: Skin is warm and dry.      Coloration: Skin is not pale.      Findings: No erythema or rash.   Neurological:      Mental Status: She is alert.   Psychiatric:         Mood and Affect: Mood normal.         Behavior: Behavior normal.         ED Course        Procedures                Results for orders placed or performed during the hospital encounter of 08/24/24 (from the past 24 hour(s))   CBC with Platelets & Differential    Narrative    The following orders were created for panel order CBC with Platelets & Differential.  Procedure                               Abnormality         Status                     ---------                               -----------         ------                     CBC with platelets and d...[[id number]]                      Final result                 Please view results for these tests on the individual  orders.   Comprehensive metabolic panel   Result Value Ref Range    Sodium 136 135 - 145 mmol/L    Potassium 3.3 (L) 3.4 - 5.3 mmol/L    Carbon Dioxide (CO2) 22 22 - 29 mmol/L    Anion Gap 19 (H) 7 - 15 mmol/L    Urea Nitrogen 10.1 6.0 - 20.0 mg/dL    Creatinine 0.83 0.51 - 0.95 mg/dL    GFR Estimate >90 >60 mL/min/1.73m2    Calcium 10.0 8.8 - 10.4 mg/dL    Chloride 95 (L) 98 - 107 mmol/L    Glucose 85 70 - 99 mg/dL    Alkaline Phosphatase 61 40 - 150 U/L    AST 25 0 - 45 U/L    ALT 18 0 - 50 U/L    Protein Total 8.1 6.4 - 8.3 g/dL    Albumin 4.6 3.5 - 5.2 g/dL    Bilirubin Total 0.5 <=1.2 mg/dL   Lipase   Result Value Ref Range    Lipase 15 13 - 60 U/L   CBC with platelets and differential   Result Value Ref Range    WBC Count 7.4 4.0 - 11.0 10e3/uL    RBC Count 4.62 3.80 - 5.20 10e6/uL    Hemoglobin 14.7 11.7 - 15.7 g/dL    Hematocrit 40.5 35.0 - 47.0 %    MCV 88 78 - 100 fL    MCH 31.8 26.5 - 33.0 pg    MCHC 36.3 31.5 - 36.5 g/dL    RDW 11.8 10.0 - 15.0 %    Platelet Count 307 150 - 450 10e3/uL    % Neutrophils 80 %    % Lymphocytes 13 %    % Monocytes 7 %    % Eosinophils 0 %    % Basophils 0 %    % Immature Granulocytes 0 %    NRBCs per 100 WBC 0 <1 /100    Absolute Neutrophils 5.9 1.6 - 8.3 10e3/uL    Absolute Lymphocytes 1.0 0.8 - 5.3 10e3/uL    Absolute Monocytes 0.5 0.0 - 1.3 10e3/uL    Absolute Eosinophils 0.0 0.0 - 0.7 10e3/uL    Absolute Basophils 0.0 0.0 - 0.2 10e3/uL    Absolute Immature Granulocytes 0.0 <=0.4 10e3/uL    Absolute NRBCs 0.0 10e3/uL   CRP inflammation   Result Value Ref Range    CRP Inflammation <3.00 <5.00 mg/L   Erythrocyte sedimentation rate auto   Result Value Ref Range    Erythrocyte Sedimentation Rate 13 0 - 20 mm/hr       Medications   ondansetron (ZOFRAN) injection 4 mg (4 mg Intravenous $Given 8/24/24 1039)   sodium chloride 0.9% BOLUS 1,000 mL (0 mLs Intravenous Stopped 8/24/24 1142)   droPERidol (INAPSINE) injection 2.5 mg (2.5 mg Intravenous $Given 8/24/24 1140)   sodium  chloride 0.9% BOLUS 500 mL (0 mLs Intravenous Stopped 8/24/24 1400)   ketorolac (TORADOL) injection 15 mg (15 mg Intravenous $Given 8/24/24 1314)       Assessments & Plan (with Medical Decision Making)   29 year old female with recent CT diagnosis of colitis involving the ascending and proximal transverse colon 2 days ago, 8/22/2024 who presents emergency department for persistent stable poorly localized cramping sharp severe abdominal pain, and primarily due to nausea and vomiting refractory to Zofran prescribed in the [facility] Diagnostic Services clinic 2 days ago.  When she was seen there 2 days ago she had an extensive laboratory evaluation which was remarkable for mildly decreased potassium at 3.2, increased anion gap 16 and increased creatinine 0.96 with normal GFR, and UA with ketones greater than 80.  A CT was performed due to severe poor localized abdominal pain which showed the ascending and proximal transverse colon colitis.  Only 1 episode of diarrhea since yesterday, without blood or mucus.  No fever or chills.  Emesis is without signs or symptoms of GI bleeding  Benign abdomen and afebrile with normal WBC.  Stool evaluation is still pending.  Probable viral colitis without evidence of GI bleeding or dysentery.  Much improved after meds and IV fluids, comfortable discharge home and able to take p.o. fluids without emesis.  No indication for repeat imaging or CT evaluation today, risk of radiation exposure based benefits.  Doubt bowel obstruction, ischemic bowel or ischemic colitis, or other emergent disease process.  Will add Phenergan to Zofran to use as needed nausea and she was discharged with instructions for supportive care.  Will await stool evaluation results and have her follow-up in primary care clinic for reassessment and follow-up of her stool results.   She was provided instructions for supportive care and will return as needed for worsened condition or worsening symptoms, or new problems  or concerns.      I have reviewed the nursing notes.    I have reviewed the findings, diagnosis, plan and need for follow up with the patient.    Medical Decision Making: High complexity  Hospitalization for IV fluid and antiemetic therapy therapy, and observation was considered and deferred. Currently appears stable and appropriate for outpatient management with close f/u.    Discharge Medication List as of 8/24/2024  2:00 PM        START taking these medications    Details   oxyCODONE (ROXICODONE) 5 MG tablet Take 1 tablet (5 mg) by mouth every 6 hours as needed for severe pain., Disp-10 tablet, R-0, E-Prescribe      promethazine (PHENERGAN) 25 MG tablet Take 1 tablet (25 mg) by mouth every 6 hours as needed for nausea or vomiting., Disp-24 tablet, R-1, E-Prescribe             Final diagnoses:   Nausea vomiting and diarrhea   Dehydration   Colitis       8/24/2024   [facility] EMERGENCY DEPT       Selvin Sifuentes MD  08/24/24 [id number]

## 2024-08-24 NOTE — ED TRIAGE NOTES
N/v/d abdominal pain x 6 days.  Pt recently seen in clinic and brought stool in this am.  Pt reports vomiting prior to coming to ed     Triage Assessment (Adult)       Row Name 08/24/24 1028          Triage Assessment    Airway WDL WDL        Respiratory WDL    Respiratory WDL WDL

## 2024-08-24 NOTE — TELEPHONE ENCOUNTER
Nurse Triage SBAR    Situation: Abdominal pain.     Background: Patient calling. Pt was seen on Thursday. Dx with colitis. She was told to go back into the ED if symptoms worsen. Zofran and steroids prescribed.     Assessment: Vomiting. Gagging. Dry heaving. Abdominal pain - 8/10 - constant. Abdominal pain is mostly in her upper abdomen but it is all over. Clammy and cold feeling. Able to stand and walk. Patient denied sweat dripping off or her face now but states 10 minutes ago she had sweat on her face.     Protocol Recommended Disposition: Call 911    Recommendation: According to the protocol, Patient should Call 911. Advised Patient that the patient needs to Call 911. Care advice given. Patient verbalizes understanding but stated she is not going to call 911 and instead she is going to have someone bring her into the ED.     Keshia Starkey RN Nursing Advisor 8/24/2024 9:50 AM     Reason for Disposition   Sounds like a life-threatening emergency to the triager   Visible sweat on face or sweat dripping down face    Additional Information   Negative: SEVERE difficulty breathing (e.g., struggling for each breath, speaks in single words)   Negative: Shock suspected (e.g., cold/pale/clammy skin, too weak to stand, low BP, rapid pulse)   Negative: Difficult to awaken or acting confused (e.g., disoriented, slurred speech)   Negative: Passed out (i.e., lost consciousness, collapsed and was not responding)    Protocols used: Abdominal Pain - Upper-A-

## 2024-08-26 ENCOUNTER — PATIENT OUTREACH (OUTPATIENT)
Dept: FAMILY MEDICINE | Facility: CLINIC | Age: 29
End: 2024-08-26
Payer: COMMERCIAL

## 2024-08-26 NOTE — TELEPHONE ENCOUNTER
ED / Discharge Outreach Protocol    Patient Contact    Attempt # 1    Was call answered?  No.  Unable to leave message. Has follow up scheduled

## 2024-09-13 ENCOUNTER — OFFICE VISIT (OUTPATIENT)
Dept: FAMILY MEDICINE | Facility: CLINIC | Age: 29
End: 2024-09-13
Payer: COMMERCIAL

## 2024-09-13 VITALS
TEMPERATURE: 98.2 F | HEART RATE: 73 BPM | HEIGHT: 62 IN | WEIGHT: 129 LBS | SYSTOLIC BLOOD PRESSURE: 105 MMHG | OXYGEN SATURATION: 99 % | BODY MASS INDEX: 23.74 KG/M2 | DIASTOLIC BLOOD PRESSURE: 64 MMHG | RESPIRATION RATE: 18 BRPM

## 2024-09-13 DIAGNOSIS — F40.01 PANIC DISORDER WITH AGORAPHOBIA: ICD-10-CM

## 2024-09-13 DIAGNOSIS — F41.1 GAD (GENERALIZED ANXIETY DISORDER): ICD-10-CM

## 2024-09-13 DIAGNOSIS — Z00.00 ROUTINE GENERAL MEDICAL EXAMINATION AT A HEALTH CARE FACILITY: Primary | ICD-10-CM

## 2024-09-13 DIAGNOSIS — Z12.4 CERVICAL CANCER SCREENING: ICD-10-CM

## 2024-09-13 DIAGNOSIS — F33.1 MODERATE EPISODE OF RECURRENT MAJOR DEPRESSIVE DISORDER (H): ICD-10-CM

## 2024-09-13 PROCEDURE — 96127 BRIEF EMOTIONAL/BEHAV ASSMT: CPT | Performed by: NURSE PRACTITIONER

## 2024-09-13 PROCEDURE — 90656 IIV3 VACC NO PRSV 0.5 ML IM: CPT | Performed by: NURSE PRACTITIONER

## 2024-09-13 PROCEDURE — 99395 PREV VISIT EST AGE 18-39: CPT | Mod: 25 | Performed by: NURSE PRACTITIONER

## 2024-09-13 PROCEDURE — 99213 OFFICE O/P EST LOW 20 MIN: CPT | Mod: 25 | Performed by: NURSE PRACTITIONER

## 2024-09-13 PROCEDURE — 90471 IMMUNIZATION ADMIN: CPT | Performed by: NURSE PRACTITIONER

## 2024-09-13 PROCEDURE — 87624 HPV HI-RISK TYP POOLED RSLT: CPT | Performed by: NURSE PRACTITIONER

## 2024-09-13 PROCEDURE — G0145 SCR C/V CYTO,THINLAYER,RESCR: HCPCS | Performed by: NURSE PRACTITIONER

## 2024-09-13 ASSESSMENT — ASTHMA QUESTIONNAIRES
ACT_TOTALSCORE: 21
QUESTION_4 LAST FOUR WEEKS HOW OFTEN HAVE YOU USED YOUR RESCUE INHALER OR NEBULIZER MEDICATION (SUCH AS ALBUTEROL): TWO OR THREE TIMES PER WEEK
QUESTION_5 LAST FOUR WEEKS HOW WOULD YOU RATE YOUR ASTHMA CONTROL: WELL CONTROLLED
ACT_TOTALSCORE: 21
QUESTION_3 LAST FOUR WEEKS HOW OFTEN DID YOUR ASTHMA SYMPTOMS (WHEEZING, COUGHING, SHORTNESS OF BREATH, CHEST TIGHTNESS OR PAIN) WAKE YOU UP AT NIGHT OR EARLIER THAN USUAL IN THE MORNING: NOT AT ALL
QUESTION_1 LAST FOUR WEEKS HOW MUCH OF THE TIME DID YOUR ASTHMA KEEP YOU FROM GETTING AS MUCH DONE AT WORK, SCHOOL OR AT HOME: NONE OF THE TIME
QUESTION_2 LAST FOUR WEEKS HOW OFTEN HAVE YOU HAD SHORTNESS OF BREATH: ONCE OR TWICE A WEEK

## 2024-09-13 ASSESSMENT — PAIN SCALES - GENERAL: PAINLEVEL: MILD PAIN (2)

## 2024-09-13 NOTE — PROGRESS NOTES
Preventive Care Visit  [facility]  Gorna Handy DNP,, NATHANAEL CNP, Family Medicine  Sep 13, 2024      Assessment & Plan     Routine general medical examination at a health care facility  Pleasant, healthy 29-year-old female in for annual preventative examination.    Cervical cancer screening  - Pap Screen Reflex to HPV if ASCUS - Recommended Age 25 - 29 Years  - HPV Hold (Lab Only)  - HPV High Risk Types DNA Cervical    Moderate episode of recurrent major depressive disorder (H)  VICTORIA (generalized anxiety disorder)  Panic disorder with agoraphobia  Chronic, managed by psychiatry.  Was seeing psychiatry through [facility], however provider is leaving and needs to establish care.  Will place new referral for patient to schedule.  Advised in the interim, me to continue to see a partner of previous provider in the interim to continue to manage care.  - Adult Mental Health  Referral; Future      Patient has been advised of split billing requirements and indicates understanding: Yes        Counseling  Appropriate preventive services were addressed with this patient via screening, questionnaire, or discussion as appropriate for fall prevention, nutrition, physical activity, Tobacco-use cessation, social engagement, weight loss and cognition.  Checklist reviewing preventive services available has been given to the patient.  Reviewed patient's diet, addressing concerns and/or questions.   She is at risk for lack of exercise and has been provided with information to increase physical activity for the benefit of her well-being.   Patient is at risk for social isolation and has been provided with information about the benefit of social connection.   The patient was instructed to see the dentist every 6 months.       See Patient Instructions    Kyra Joshi is a 29 year old, presenting for the following:  Physical and weight management     [facility]'s psychiatry at [facility]           9/13/2024     4:17 PM   Additional Questions   Roomed by spencer wells cma        Health Care Directive  Patient does not have a Health Care Directive or Living Will: Discussed advance care planning with patient; however, patient declined at this time.    HPI    Wt Readings from Last 3 Encounters:   09/13/24 58.5 kg (129 lb)   08/24/24 61.2 kg (135 lb)   08/22/24 67.6 kg (149 lb)              9/13/2024   General Health   How would you rate your overall physical health? (!) FAIR   Feel stress (tense, anxious, or unable to sleep) Very much      (!) STRESS CONCERN      9/13/2024   Nutrition   Three or more servings of calcium each day? (!) NO   Diet: Carbohydrate counting    Breakfast skipped   How many servings of fruit and vegetables per day? 4 or more   How many sweetened beverages each day? 0-1       Multiple values from one day are sorted in reverse-chronological order         9/13/2024   Exercise   Days per week of moderate/strenous exercise 2 days   Average minutes spent exercising at this level 30 min      (!) EXERCISE CONCERN      9/13/2024   Social Factors   Frequency of gathering with friends or relatives Never   Worry food won't last until get money to buy more No   Food not last or not have enough money for food? No   Do you have housing? (Housing is defined as stable permanent housing and does not include staying ouside in a car, in a tent, in an abandoned building, in an overnight shelter, or couch-surfing.) Yes   Are you worried about losing your housing? No   Lack of transportation? No   Unable to get utilities (heat,electricity)? No      (!) SOCIAL CONNECTIONS CONCERN      9/13/2024   Dental   Dentist two times every year? (!) NO            9/13/2024   TB Screening   Were you born outside of the [place]? No            Today's PHQ-9 Score:       4/24/2024     8:14 AM   PHQ-9 SCORE   PHQ-9 Total Score MyChart 5 (Mild depression)   PHQ-9 Total Score 5           9/13/2024   Substance Use   Alcohol more than 3/day  "or more than 7/wk No   Do you use any other substances recreationally? (!) DECLINE        Social History     Tobacco Use    Smoking status: Former     Types: Vaping Device     Passive exposure: Past    Smokeless tobacco: Never    Tobacco comments:     5 cigarettes a day    Vaping Use    Vaping status: Former    Substances: Nicotine, Flavoring    Devices: Disposable   Substance Use Topics    Alcohol use: Yes     Comment: social    Drug use: No          Mammogram Screening - Patient under 40 years of age: Routine Mammogram Screening not recommended.         9/13/2024   STI Screening   New sexual partner(s) since last STI/HIV test? No        History of abnormal Pap smear: YES - reflected in Problem List and Health Maintenance accordingly        4/23/2021    11:20 AM 8/15/2017     8:04 AM   PAP / HPV   PAP (Historical)  NIL    PAP-ABSTRACT See Scanned Document            This result is from an external source.           9/13/2024   Contraception/Family Planning   Questions about contraception or family planning No           Reviewed and updated as needed this visit by Provider                    Past Medical History:   Diagnosis Date    Abnormal Pap smear of cervix 04/23/2021    CARDIOVASCULAR SCREENING; LDL GOAL LESS THAN 160 08/01/2014     History reviewed. No pertinent surgical history.      Review of Systems  Constitutional, neuro, ENT, endocrine, pulmonary, cardiac, gastrointestinal, genitourinary, musculoskeletal, integument and psychiatric systems are negative, except as otherwise noted.     Objective    Exam  /64 (BP Location: Right arm, Patient Position: Sitting, Cuff Size: Adult Regular)   Pulse 73   Temp 98.2  F (36.8  C)   Resp 18   Ht 1.575 m (5' 2.01\")   Wt 58.5 kg (129 lb)   LMP 08/18/2024 (Exact Date)   SpO2 99%   BMI 23.59 kg/m     Estimated body mass index is 23.59 kg/m  as calculated from the following:    Height as of this encounter: 1.575 m (5' 2.01\").    Weight as of this encounter: " 58.5 kg (129 lb).    Physical Exam  GENERAL: alert and no distress  EYES: Eyes grossly normal to inspection, PERRL and conjunctivae and sclerae normal  HENT: ear canals and TM's normal, nose and mouth without ulcers or lesions  NECK: no adenopathy, no asymmetry, masses, or scars  RESP: lungs clear to auscultation - no rales, rhonchi or wheezes  BREAST: normal without masses, tenderness or nipple discharge and no palpable axillary masses or adenopathy  CV: regular rate and rhythm, normal S1 S2, no S3 or S4, no murmur, click or rub, no peripheral edema  ABDOMEN: soft, nontender, no hepatosplenomegaly, no masses and bowel sounds normal   (female) w/bimanual: normal female external genitalia, normal urethral meatus, normal vaginal mucosa, and normal cervix/adnexa/uterus without masses or discharge  MS: no gross musculoskeletal defects noted, no edema  SKIN: no suspicious lesions or rashes  NEURO: Normal strength and tone, mentation intact and speech normal  PSYCH: mentation appears normal, affect normal/bright        Signed Electronically by: Goran Handy DNP,, NATHANAEL CNP      Chart documentation with Dragon Voice recognition Software. Although reviewed after completion, some words and grammatical errors may remain.

## 2024-09-13 NOTE — NURSING NOTE
"Chief Complaint   Patient presents with    Physical    weight management        Initial Pulse 73   Temp 98.2  F (36.8  C)   Resp 18   Ht 1.575 m (5' 2.01\")   Wt 58.5 kg (129 lb)   LMP 08/18/2024 (Exact Date)   SpO2 99%   BMI 23.59 kg/m   Estimated body mass index is 23.59 kg/m  as calculated from the following:    Height as of this encounter: 1.575 m (5' 2.01\").    Weight as of this encounter: 58.5 kg (129 lb).    Patient presents to the clinic using No DME    Is there anyone who you would like to be able to receive your results? No  If yes have patient fill out LORETTA      "

## 2024-09-16 ENCOUNTER — OFFICE VISIT (OUTPATIENT)
Dept: GASTROENTEROLOGY | Facility: CLINIC | Age: 29
End: 2024-09-16
Attending: FAMILY MEDICINE
Payer: COMMERCIAL

## 2024-09-16 ENCOUNTER — TELEPHONE (OUTPATIENT)
Dept: GASTROENTEROLOGY | Facility: CLINIC | Age: 29
End: 2024-09-16

## 2024-09-16 VITALS
DIASTOLIC BLOOD PRESSURE: 68 MMHG | HEART RATE: 76 BPM | BODY MASS INDEX: 23.74 KG/M2 | HEIGHT: 62 IN | SYSTOLIC BLOOD PRESSURE: 112 MMHG | WEIGHT: 129 LBS

## 2024-09-16 DIAGNOSIS — R93.5 ABNORMAL CT OF THE ABDOMEN: Primary | ICD-10-CM

## 2024-09-16 DIAGNOSIS — K52.9 ACUTE COLITIS: ICD-10-CM

## 2024-09-16 DIAGNOSIS — R11.2 NAUSEA AND VOMITING, UNSPECIFIED VOMITING TYPE: ICD-10-CM

## 2024-09-16 DIAGNOSIS — R19.7 DIARRHEA, UNSPECIFIED TYPE: ICD-10-CM

## 2024-09-16 DIAGNOSIS — K52.9 NON-SPECIFIC COLITIS: ICD-10-CM

## 2024-09-16 DIAGNOSIS — R89.9 ABNORMAL LABORATORY TEST: ICD-10-CM

## 2024-09-16 LAB
CA-I BLD-MCNC: 5.2 MG/DL (ref 4.4–5.2)
TSH SERPL DL<=0.005 MIU/L-ACNC: 0.58 UIU/ML (ref 0.3–4.2)

## 2024-09-16 PROCEDURE — 82330 ASSAY OF CALCIUM: CPT | Performed by: INTERNAL MEDICINE

## 2024-09-16 PROCEDURE — 84443 ASSAY THYROID STIM HORMONE: CPT | Performed by: INTERNAL MEDICINE

## 2024-09-16 PROCEDURE — 99203 OFFICE O/P NEW LOW 30 MIN: CPT | Performed by: INTERNAL MEDICINE

## 2024-09-16 PROCEDURE — 36415 COLL VENOUS BLD VENIPUNCTURE: CPT | Performed by: INTERNAL MEDICINE

## 2024-09-16 RX ORDER — PANTOPRAZOLE SODIUM 40 MG/1
40 TABLET, DELAYED RELEASE ORAL DAILY
Qty: 90 TABLET | Refills: 3 | Status: SHIPPED | OUTPATIENT
Start: 2024-09-16

## 2024-09-16 ASSESSMENT — PAIN SCALES - GENERAL: PAINLEVEL: NO PAIN (0)

## 2024-09-16 NOTE — PROGRESS NOTES
Gastroenterology    29-year-old accompanied by family.  Here to review nausea vomiting patient notes this is monthly and may correlate with her menstrual cycle symptoms have been present for 2 years duration.  Also associated with intestinal distress at the upper tract.    Patient notes events have not occurred after dental work.  No clear association with sleep deprivation or illness.  No personal or family history of vascular headaches.  There is THC.  Patient has stopped the GL P1 agonist end of August.  CT scan on 22 August revealed colitis but this was during a norovirus infection.  Patient notes she has had other episodes without CT imaging she was treated with budesonide for concerns of Crohn's disease.    Upper abdominal stress may occur for a week or 2 intermittently in waves described as a burning.  No clear aggravating alleviating or associated features.  No alarm features no bleeding.    Stools are Humphreys type VI typically 2 to 3/day.  Zofran was less effective.  Phenergan has been more helpful.    Past medical history reviewed on epic    Medications reviewed on epic    Allergies reviewed on epic    Upon exam.  Blood pressure 112 x 6 a pulse 76 afebrile BMI 23.59, head and neck unremarkable, cardiac S1-S2 without murmur lungs clear throughout abdomen soft nontender without organomegaly no lower extremity edema skin without rash.    Impression: Upper tract symptoms episodic nausea and vomiting.  Differential diagnosis includes CVS, CHS, abdominal migraine, other.  Gastroparesis seems less likely.  Elements of postinfectious IBS doubt IBD.  Would not continue Entocort at this time.  No clear plans for taper discussed with patient.    Plan: 1.  Upper endoscopy for evaluation for nausea and vomiting.  Patient prefers this closer to home at [facility], 2.  Defer to GYN regarding association with menstrual cycle but this is not a typical association in GI, 3.  Wash out of THC this would require 3 to 6 months  overall, 4.  Protonix 40 mg preevening meal to help in the morning prescription sent, 5.  Phenergan refills as needed, 6.  Calcium and TSH for completeness, 8.  Patient does not have 21 tablets of Entocort will send enough to taper.  Twice daily for 1 week once daily for a week and then off.  Follow-up as arranged.  Further recommendations to follow.  Consider TCA in the future.    Return as scheduled

## 2024-09-16 NOTE — PATIENT INSTRUCTIONS
Discussed    Will check some labs    Need to taper off the Entocort.  Check the pill count.  2 pills for 1 week and then 1 pill for 1 week and then none.  Need 21 in all    Start Protonix daily.  New Rx sent      Return to clinic 1-2 months

## 2024-09-16 NOTE — H&P (VIEW-ONLY)
Gastroenterology    29-year-old accompanied by family.  Here to review nausea vomiting patient notes this is monthly and may correlate with her menstrual cycle symptoms have been present for 2 years duration.  Also associated with intestinal distress at the upper tract.    Patient notes events have not occurred after dental work.  No clear association with sleep deprivation or illness.  No personal or family history of vascular headaches.  There is THC.  Patient has stopped the GL P1 agonist end of August.  CT scan on 22 August revealed colitis but this was during a norovirus infection.  Patient notes she has had other episodes without CT imaging she was treated with budesonide for concerns of Crohn's disease.    Upper abdominal stress may occur for a week or 2 intermittently in waves described as a burning.  No clear aggravating alleviating or associated features.  No alarm features no bleeding.    Stools are Laramie type VI typically 2 to 3/day.  Zofran was less effective.  Phenergan has been more helpful.    Past medical history reviewed on epic    Medications reviewed on epic    Allergies reviewed on epic    Upon exam.  Blood pressure 112 x 6 a pulse 76 afebrile BMI 23.59, head and neck unremarkable, cardiac S1-S2 without murmur lungs clear throughout abdomen soft nontender without organomegaly no lower extremity edema skin without rash.    Impression: Upper tract symptoms episodic nausea and vomiting.  Differential diagnosis includes CVS, CHS, abdominal migraine, other.  Gastroparesis seems less likely.  Elements of postinfectious IBS doubt IBD.  Would not continue Entocort at this time.  No clear plans for taper discussed with patient.    Plan: 1.  Upper endoscopy for evaluation for nausea and vomiting.  Patient prefers this closer to home at [facility], 2.  Defer to GYN regarding association with menstrual cycle but this is not a typical association in GI, 3.  Wash out of THC this would require 3 to 6 months  overall, 4.  Protonix 40 mg preevening meal to help in the morning prescription sent, 5.  Phenergan refills as needed, 6.  Calcium and TSH for completeness, 8.  Patient does not have 21 tablets of Entocort will send enough to taper.  Twice daily for 1 week once daily for a week and then off.  Follow-up as arranged.  Further recommendations to follow.  Consider TCA in the future.    Return as scheduled

## 2024-09-16 NOTE — LETTER
September 16, 2024      Madelyn Quintana  [address] [address] [address]        To Whom It May Concern:    Madelyn Quintana  was seen today at my clinic.  Please excuse her from work today due to an appointment.        Sincerely,        Spencer Hernandez MD

## 2024-09-16 NOTE — LETTER
9/16/2024      Madelyn Quintana  [address] [place] [address]      Dear Colleague,    Thank you for referring your patient, Madelyn Quintana, to the [facility]. Please see a copy of my visit note below.    Gastroenterology    29-year-old accompanied by family.  Here to review nausea vomiting patient notes this is monthly and may correlate with her menstrual cycle symptoms have been present for 2 years duration.  Also associated with intestinal distress at the upper tract.    Patient notes events have not occurred after dental work.  No clear association with sleep deprivation or illness.  No personal or family history of vascular headaches.  There is THC.  Patient has stopped the GL P1 agonist end of August.  CT scan on 22 August revealed colitis but this was during a norovirus infection.  Patient notes she has had other episodes without CT imaging she was treated with budesonide for concerns of Crohn's disease.    Upper abdominal stress may occur for a week or 2 intermittently in waves described as a burning.  No clear aggravating alleviating or associated features.  No alarm features no bleeding.    Stools are Sterling type VI typically 2 to 3/day.  Zofran was less effective.  Phenergan has been more helpful.    Past medical history reviewed on epic    Medications reviewed on epic    Allergies reviewed on epic    Upon exam.  Blood pressure 112 x 6 a pulse 76 afebrile BMI 23.59, head and neck unremarkable, cardiac S1-S2 without murmur lungs clear throughout abdomen soft nontender without organomegaly no lower extremity edema skin without rash.    Impression: Upper tract symptoms episodic nausea and vomiting.  Differential diagnosis includes CVS, CHS, abdominal migraine, other.  Gastroparesis seems less likely.  Elements of postinfectious IBS doubt IBD.  Would not continue Entocort at this time.  No clear plans for taper discussed with patient.    Plan: 1.  Upper endoscopy for evaluation for  nausea and vomiting.  Patient prefers this closer to home at [facility], 2.  Defer to GYN regarding association with menstrual cycle but this is not a typical association in GI, 3.  Wash out of THC this would require 3 to 6 months overall, 4.  Protonix 40 mg preevening meal to help in the morning prescription sent, 5.  Phenergan refills as needed, 6.  Calcium and TSH for completeness, 8.  Patient does not have 21 tablets of Entocort will send enough to taper.  Twice daily for 1 week once daily for a week and then off.  Follow-up as arranged.  Further recommendations to follow.  Consider TCA in the future.    Return as scheduled    Again, thank you for allowing me to participate in the care of your patient.        Sincerely,        Spencer Hernandez MD

## 2024-09-16 NOTE — TELEPHONE ENCOUNTER
"Endoscopy Scheduling Screen      What insurance is in the chart?  Other:  [employer]    Ordering/Referring Provider: Spencer Hernandez MD    (If ordering provider performs procedure, schedule with ordering provider unless otherwise instructed. )    BMI: There is no height or weight on file to calculate BMI.     Sedation Ordered  general anesthesia.   BMI<= 45 45 < BMI <= 48 48 < BMI < = 50  BMI > 50   No Restrictions No [facility]  No [facility]  [facility] with exceptions Hospital Only OR Only       Do you have a history of malignant hyperthermia?  No    (Females) Are you currently pregnant?   No     Have you been diagnosed or told you have pulmonary hypertension?   No    Do you have any heart conditions?  No     Do you have an LVAD?  No    Have you been told you have moderate to severe sleep apnea?  No.    Have you been told you have COPD, asthma, or any other lung disease?  Yes     What breathing problems do you have?  Asthma and Reactive air    Do you use home oxygen?  No    Have your breathing problems required an ED visit or hospitalization in the last year?  No.    Have you ever had or are you waiting for an organ transplant?  No. Continue scheduling, no site restrictions.    Have you had a stroke or transient ischemic attack (TIA aka \"mini stroke\" in the last 6 months?   No    Have you been diagnosed with or been told you have cirrhosis of the liver?   No.    Are you currently on dialysis?   No    Do you need assistance transferring?   No    BMI: There is no height or weight on file to calculate BMI.     Is patients BMI > 50?  No    BMI > 40?  No    Do you have a diagnosis of diabetes?  No    Do you take an injectable medication for weight loss or diabetes (excluding insulin)?  No    Do you take the medication Naltrexone?  No    Do you take blood thinners?  No     Prep   Are you currently on dialysis or do you have chronic kidney disease?  No    Do you have a diagnosis of cystic fibrosis (CF)?  No    On a regular basis do " you go 3 -5 days between bowel movements?  No    Preferred Pharmacy:  No Pharmacies Listed    Final Scheduling Details     Procedure scheduled  Upper endoscopy (EGD)    Surgeon:  Tyrell      Date of procedure:  9/24/24     Location  [place] - Per order.    What is your communication preference for Instructions and/or Bowel Prep?   [employer]    Patient Reminders:    You will receive a call from a Nurse to review instructions and health history.  This assessment must be completed prior to your procedure.  Failure to complete the Nurse assessment may result in the procedure being cancelled.       On the day of your procedure, please designate an adult(s) who can drive you home stay with you for the next 24 hours. The medicines used in the exam will make you sleepy. You will not be able to drive.       You cannot take public transportation, ride share services, or non-medical taxi service without a responsible caregiver.  Medical transport services are allowed with the requirement that a responsible caregiver will receive you at your destination.  We require that drivers and caregivers are confirmed prior to your procedure.

## 2024-09-16 NOTE — LETTER
September 17, 2024      Madelyn Quintana  [address] [place] [address]        Dear ,    We are writing to inform you of your test results.    Your test results fall within the expected range(s) or remain unchanged from previous results.  Please continue with current treatment plan.    Resulted Orders   Ionized Calcium   Result Value Ref Range    Calcium Ionized Whole Blood 5.2 4.4 - 5.2 mg/dL   TSH   Result Value Ref Range    TSH 0.58 0.30 - 4.20 uIU/mL       If you have any questions or concerns, please call the clinic at the number listed above.       Sincerely,      Spencer Hernandez MD

## 2024-09-18 ENCOUNTER — ANESTHESIA EVENT (OUTPATIENT)
Dept: GASTROENTEROLOGY | Facility: CLINIC | Age: 29
End: 2024-09-18
Payer: COMMERCIAL

## 2024-09-18 LAB
BKR LAB AP GYN ADEQUACY: NORMAL
BKR LAB AP GYN INTERPRETATION: NORMAL
BKR LAB AP HPV REFLEX: NORMAL
BKR LAB AP LMP: NORMAL
BKR LAB AP PREVIOUS ABNORMAL: NORMAL
HPV HR 12 DNA CVX QL NAA+PROBE: NEGATIVE
HPV16 DNA CVX QL NAA+PROBE: NEGATIVE
HPV18 DNA CVX QL NAA+PROBE: NEGATIVE
HUMAN PAPILLOMA VIRUS FINAL DIAGNOSIS: NORMAL
PATH REPORT.COMMENTS IMP SPEC: NORMAL
PATH REPORT.COMMENTS IMP SPEC: NORMAL
PATH REPORT.RELEVANT HX SPEC: NORMAL

## 2024-09-18 ASSESSMENT — LIFESTYLE VARIABLES: TOBACCO_USE: 1

## 2024-09-18 NOTE — PATIENT INSTRUCTIONS
Moderate episode of recurrent major depressive disorder (H)  VICTORIA (generalized anxiety disorder)  Panic disorder with agoraphobia  Chronic, managed by psychiatry.  Was seeing psychiatry through [facility], however provider is leaving and needs to establish care.  Will place new referral for patient to schedule.  Advised in the interim, me to continue to see a partner of previous provider in the interim to continue to manage care.  - Adult Mental Health  Referral; Future      Patient Education   Preventive Care Advice   This is general advice given by our system to help you stay healthy. However, your care team may have specific advice just for you. Please talk to your care team about your preventive care needs.  Nutrition  Eat 5 or more servings of fruits and vegetables each day.  Try wheat bread, brown rice and whole grain pasta (instead of white bread, rice, and pasta).  Get enough calcium and vitamin D. Check the label on foods and aim for 100% of the RDA (recommended daily allowance).  Lifestyle  Exercise at least 150 minutes each week  (30 minutes a day, 5 days a week).  Do muscle strengthening activities 2 days a week. These help control your weight and prevent disease.  No smoking.  Wear sunscreen to prevent skin cancer.  Have a dental exam and cleaning every 6 months.  Yearly exams  See your health care team every year to talk about:  Any changes in your health.  Any medicines your care team has prescribed.  Preventive care, family planning, and ways to prevent chronic diseases.  Shots (vaccines)   HPV shots (up to age 26), if you've never had them before.  Hepatitis B shots (up to age 59), if you've never had them before.  COVID-19 shot: Get this shot when it's due.  Flu shot: Get a flu shot every year.  Tetanus shot: Get a tetanus shot every 10 years.  Pneumococcal, hepatitis A, and RSV shots: Ask your care team if you need these based on your risk.  Shingles shot (for age 50 and up)  General  health tests  Diabetes screening:  Starting at age 35, Get screened for diabetes at least every 3 years.  If you are younger than age 35, ask your care team if you should be screened for diabetes.  Cholesterol test: At age 39, start having a cholesterol test every 5 years, or more often if advised.  Bone density scan (DEXA): At age 50, ask your care team if you should have this scan for osteoporosis (brittle bones).  Hepatitis C: Get tested at least once in your life.  STIs (sexually transmitted infections)  Before age 24: Ask your care team if you should be screened for STIs.  After age 24: Get screened for STIs if you're at risk. You are at risk for STIs (including HIV) if:  You are sexually active with more than one person.  You don't use condoms every time.  You or a partner was diagnosed with a sexually transmitted infection.  If you are at risk for HIV, ask about PrEP medicine to prevent HIV.  Get tested for HIV at least once in your life, whether you are at risk for HIV or not.  Cancer screening tests  Cervical cancer screening: If you have a cervix, begin getting regular cervical cancer screening tests starting at age 21.  Breast cancer scan (mammogram): If you've ever had breasts, begin having regular mammograms starting at age 40. This is a scan to check for breast cancer.  Colon cancer screening: It is important to start screening for colon cancer at age 45.  Have a colonoscopy test every 10 years (or more often if you're at risk) Or, ask your provider about stool tests like a FIT test every year or Cologuard test every 3 years.  To learn more about your testing options, visit:   .  For help making a decision, visit:   https://bit.ly/il74729.  Prostate cancer screening test: If you have a prostate, ask your care team if a prostate cancer screening test (PSA) at age 55 is right for you.  Lung cancer screening: If you are a current or former smoker ages 50 to 80, ask your care team if ongoing lung cancer  screenings are right for you.  For informational purposes only. Not to replace the advice of your health care provider. Copyright   2023 [facility]. All rights reserved. Clinically reviewed by the [facility] Transitions Program. [employer] 164727 - REV 01/24.  Learning About Stress  What is stress?     Stress is your body's response to a hard situation. Your body can have a physical, emotional, or mental response. Stress is a fact of life for most people, and it affects everyone differently. What causes stress for you may not be stressful for someone else.  A lot of things can cause stress. You may feel stress when you go on a job interview, take a test, or run a race. This kind of short-term stress is normal and even useful. It can help you if you need to work hard or react quickly. For example, stress can help you finish an important job on time.  Long-term stress is caused by ongoing stressful situations or events. Examples of long-term stress include long-term health problems, ongoing problems at work, or conflicts in your family. Long-term stress can harm your health.  How does stress affect your health?  When you are stressed, your body responds as though you are in danger. It makes hormones that speed up your heart, make you breathe faster, and give you a burst of energy. This is called the fight-or-flight stress response. If the stress is over quickly, your body goes back to normal and no harm is done.  But if stress happens too often or lasts too long, it can have bad effects. Long-term stress can make you more likely to get sick, and it can make symptoms of some diseases worse. If you tense up when you are stressed, you may develop neck, shoulder, or low back pain. Stress is linked to high blood pressure and heart disease.  Stress also harms your emotional health. It can make you rueda, tense, or depressed. Your relationships may suffer, and you may not do well at work or school.  What  can you do to manage stress?  You can try these things to help manage stress:   Do something active. Exercise or activity can help reduce stress. Walking is a great way to get started. Even everyday activities such as housecleaning or yard work can help.  Try yoga or rebecca chi. These techniques combine exercise and meditation. You may need some training at first to learn them.  Do something you enjoy. For example, listen to music or go to a movie. Practice your hobby or do volunteer work.  Meditate. This can help you relax, because you are not worrying about what happened before or what may happen in the future.  Do guided imagery. Imagine yourself in any setting that helps you feel calm. You can use online videos, books, or a teacher to guide you.  Do breathing exercises. For example:  From a standing position, bend forward from the waist with your knees slightly bent. Let your arms dangle close to the floor.  Breathe in slowly and deeply as you return to a standing position. Roll up slowly and lift your head last.  Hold your breath for just a few seconds in the standing position.  Breathe out slowly and bend forward from the waist.  Let your feelings out. Talk, laugh, cry, and express anger when you need to. Talking with supportive friends or family, a counselor, or a oren leader about your feelings is a healthy way to relieve stress. Avoid discussing your feelings with people who make you feel worse.  Write. It may help to write about things that are bothering you. This helps you find out how much stress you feel and what is causing it. When you know this, you can find better ways to cope.  What can you do to prevent stress?  You might try some of these things to help prevent stress:  Manage your time. This helps you find time to do the things you want and need to do.  Get enough sleep. Your body recovers from the stresses of the day while you are sleeping.  Get support. Your family, friends, and community can  "make a difference in how you experience stress.  Limit your news feed. Avoid or limit time on social media or news that may make you feel stressed.  Do something active. Exercise or activity can help reduce stress. Walking is a great way to get started.  Where can you learn more?  Go to https://www.[employer].net/patiented  Enter N032 in the search box to learn more about \"Learning About Stress.\"  Current as of: October 24, 2023               Content Version: 14.0    7893-1963 [employer].   Care instructions adapted under license by your healthcare professional. If you have questions about a medical condition or this instruction, always ask your healthcare professional. [employer] disclaims any warranty or liability for your use of this information.         "

## 2024-09-19 NOTE — ANESTHESIA PREPROCEDURE EVALUATION
Anesthesia Pre-Procedure Evaluation    Patient: Madelyn Quintana   MRN: [id number] : 1995        Procedure : Procedure(s):  Esophagoscopy, gastroscopy, duodenoscopy (EGD), combined          Past Medical History:   Diagnosis Date    Abnormal Pap smear of cervix 2021    CARDIOVASCULAR SCREENING; LDL GOAL LESS THAN 160 2014      History reviewed. No pertinent surgical history.   Allergies   Allergen Reactions    Lactose Diarrhea     Intolerance  Other reaction(s): GI intolerance  Intolerance  Intolerance      Social History     Tobacco Use    Smoking status: Former     Types: Vaping Device     Passive exposure: Past    Smokeless tobacco: Never    Tobacco comments:     5 cigarettes a day    Substance Use Topics    Alcohol use: Yes     Comment: social      Wt Readings from Last 1 Encounters:   24 58.5 kg (129 lb)        Anesthesia Evaluation            ROS/MED HX  ENT/Pulmonary:     (+)                tobacco use, Past use,     asthma  Treatment: Inhaler prn,                 Neurologic:       Cardiovascular:     (+)  - -   -  - -                                 Previous cardiac testing   Echo: Date: Results:    Stress Test:  Date: Results:    ECG Reviewed:  Date:  Results:  Sinus Rhythm   WITHIN NORMAL LIMITS  Cath:  Date: Results:      METS/Exercise Tolerance:     Hematologic:       Musculoskeletal:       GI/Hepatic: Comment: N/V      Renal/Genitourinary:       Endo:       Psychiatric/Substance Use:     (+) psychiatric history anxiety, depression and other (comment)       Infectious Disease:       Malignancy:       Other:          Physical Exam    Airway        Mallampati: I   TM distance: > 3 FB   Neck ROM: full   Mouth opening: > 3 cm    Respiratory Devices and Support         Dental       (+) Completely normal teeth      Cardiovascular   cardiovascular exam normal       Rhythm and rate: regular and normal     Pulmonary   pulmonary exam normal        breath sounds clear to auscultation  "          OUTSIDE LABS:  CBC:   Lab Results   Component Value Date    WBC 7.4 08/24/2024    WBC 9.4 08/22/2024    HGB 14.7 08/24/2024    HGB 14.3 08/22/2024    HCT 40.5 08/24/2024    HCT 42.2 08/22/2024     08/24/2024     08/22/2024     BMP:   Lab Results   Component Value Date     08/24/2024     08/22/2024    POTASSIUM 3.3 (L) 08/24/2024    POTASSIUM 3.2 (L) 08/22/2024    CHLORIDE 95 (L) 08/24/2024    CHLORIDE 97 (L) 08/22/2024    CO2 22 08/24/2024    CO2 25 08/22/2024    BUN 10.1 08/24/2024    BUN 14.3 08/22/2024    CR 0.83 08/24/2024    CR 0.96 (H) 08/22/2024    GLC 85 08/24/2024    GLC 85 08/22/2024     COAGS: No results found for: \"PTT\", \"INR\", \"FIBR\"  POC:   Lab Results   Component Value Date    HCG Negative 08/22/2024     HEPATIC:   Lab Results   Component Value Date    ALBUMIN 4.6 08/24/2024    PROTTOTAL 8.1 08/24/2024    ALT 18 08/24/2024    AST 25 08/24/2024    ALKPHOS 61 08/24/2024    BILITOTAL 0.5 08/24/2024     OTHER:   Lab Results   Component Value Date    A1C 5.3 03/01/2024    CHANELLE 10.0 08/24/2024    MAG 1.6 (L) 01/01/2024    LIPASE 15 08/24/2024    TSH 0.58 09/16/2024    SED 13 08/24/2024       Anesthesia Plan    ASA Status:  2    NPO Status:  NPO Appropriate    Anesthesia Type: General.     - Airway: Native airway   Induction: Intravenous, Propofol.   Maintenance: TIVA.        Consents    Anesthesia Plan(s) and associated risks, benefits, and realistic alternatives discussed. Questions answered and patient/representative(s) expressed understanding.     - Discussed: Risks, Benefits and Alternatives for BOTH SEDATION and the PROCEDURE were discussed     - Discussed with:  Patient            Postoperative Care       PONV prophylaxis: Background Propofol Infusion     Comments:               NATHANAEL Lira CRNA    I have reviewed the pertinent notes and labs in the chart from the past 30 days and (re)examined the patient.  Any updates or changes from those notes are " reflected in this note.    # Hypokalemia: Lowest K = 3.2 mmol/L in last 30 days, will replace as needed      # Anion Gap Metabolic Acidosis: Highest Anion Gap = 19 mmol/L in last 30 days, will monitor and treat as appropriate

## 2024-09-24 ENCOUNTER — HOSPITAL ENCOUNTER (OUTPATIENT)
Facility: CLINIC | Age: 29
Discharge: HOME | End: 2024-09-24
Attending: STUDENT IN AN ORGANIZED HEALTH CARE EDUCATION/TRAINING PROGRAM | Admitting: STUDENT IN AN ORGANIZED HEALTH CARE EDUCATION/TRAINING PROGRAM
Payer: COMMERCIAL

## 2024-09-24 ENCOUNTER — ANESTHESIA (OUTPATIENT)
Dept: GASTROENTEROLOGY | Facility: CLINIC | Age: 29
End: 2024-09-24
Payer: COMMERCIAL

## 2024-09-24 VITALS
TEMPERATURE: 99 F | DIASTOLIC BLOOD PRESSURE: 83 MMHG | RESPIRATION RATE: 16 BRPM | OXYGEN SATURATION: 99 % | SYSTOLIC BLOOD PRESSURE: 128 MMHG | HEART RATE: 70 BPM

## 2024-09-24 LAB — UPPER GI ENDOSCOPY: NORMAL

## 2024-09-24 PROCEDURE — 250N000011 HC RX IP 250 OP 636

## 2024-09-24 PROCEDURE — 88305 TISSUE EXAM BY PATHOLOGIST: CPT | Mod: TC | Performed by: STUDENT IN AN ORGANIZED HEALTH CARE EDUCATION/TRAINING PROGRAM

## 2024-09-24 PROCEDURE — 258N000003 HC RX IP 258 OP 636

## 2024-09-24 PROCEDURE — 250N000009 HC RX 250

## 2024-09-24 PROCEDURE — 258N000003 HC RX IP 258 OP 636: Performed by: PHYSICIAN ASSISTANT

## 2024-09-24 PROCEDURE — 43239 EGD BIOPSY SINGLE/MULTIPLE: CPT | Performed by: STUDENT IN AN ORGANIZED HEALTH CARE EDUCATION/TRAINING PROGRAM

## 2024-09-24 PROCEDURE — 88305 TISSUE EXAM BY PATHOLOGIST: CPT | Mod: 26 | Performed by: PATHOLOGY

## 2024-09-24 PROCEDURE — 370N000017 HC ANESTHESIA TECHNICAL FEE, PER MIN: Performed by: STUDENT IN AN ORGANIZED HEALTH CARE EDUCATION/TRAINING PROGRAM

## 2024-09-24 PROCEDURE — 250N000009 HC RX 250: Performed by: PHYSICIAN ASSISTANT

## 2024-09-24 RX ORDER — PROPOFOL 10 MG/ML
INJECTION, EMULSION INTRAVENOUS PRN
Status: DISCONTINUED | OUTPATIENT
Start: 2024-09-24 | End: 2024-09-24

## 2024-09-24 RX ORDER — ALBUTEROL SULFATE 0.83 MG/ML
2.5 SOLUTION RESPIRATORY (INHALATION) EVERY 4 HOURS PRN
Status: DISCONTINUED | OUTPATIENT
Start: 2024-09-24 | End: 2024-09-24 | Stop reason: HOSPADM

## 2024-09-24 RX ORDER — NALOXONE HYDROCHLORIDE 0.4 MG/ML
0.1 INJECTION, SOLUTION INTRAMUSCULAR; INTRAVENOUS; SUBCUTANEOUS
Status: DISCONTINUED | OUTPATIENT
Start: 2024-09-24 | End: 2024-09-24 | Stop reason: HOSPADM

## 2024-09-24 RX ORDER — SODIUM CHLORIDE, SODIUM LACTATE, POTASSIUM CHLORIDE, CALCIUM CHLORIDE 600; 310; 30; 20 MG/100ML; MG/100ML; MG/100ML; MG/100ML
INJECTION, SOLUTION INTRAVENOUS CONTINUOUS PRN
Status: DISCONTINUED | OUTPATIENT
Start: 2024-09-24 | End: 2024-09-24

## 2024-09-24 RX ORDER — NALOXONE HYDROCHLORIDE 0.4 MG/ML
0.2 INJECTION, SOLUTION INTRAMUSCULAR; INTRAVENOUS; SUBCUTANEOUS
Status: DISCONTINUED | OUTPATIENT
Start: 2024-09-24 | End: 2024-09-24 | Stop reason: HOSPADM

## 2024-09-24 RX ORDER — LIDOCAINE HYDROCHLORIDE 20 MG/ML
INJECTION, SOLUTION INFILTRATION; PERINEURAL PRN
Status: DISCONTINUED | OUTPATIENT
Start: 2024-09-24 | End: 2024-09-24

## 2024-09-24 RX ORDER — ONDANSETRON 2 MG/ML
4 INJECTION INTRAMUSCULAR; INTRAVENOUS EVERY 30 MIN PRN
Status: DISCONTINUED | OUTPATIENT
Start: 2024-09-24 | End: 2024-09-24 | Stop reason: HOSPADM

## 2024-09-24 RX ORDER — ONDANSETRON 4 MG/1
4 TABLET, ORALLY DISINTEGRATING ORAL EVERY 30 MIN PRN
Status: DISCONTINUED | OUTPATIENT
Start: 2024-09-24 | End: 2024-09-24 | Stop reason: HOSPADM

## 2024-09-24 RX ORDER — SODIUM CHLORIDE, SODIUM LACTATE, POTASSIUM CHLORIDE, CALCIUM CHLORIDE 600; 310; 30; 20 MG/100ML; MG/100ML; MG/100ML; MG/100ML
INJECTION, SOLUTION INTRAVENOUS CONTINUOUS
Status: DISCONTINUED | OUTPATIENT
Start: 2024-09-24 | End: 2024-09-24 | Stop reason: HOSPADM

## 2024-09-24 RX ORDER — ALBUTEROL SULFATE 0.83 MG/ML
2.5 SOLUTION RESPIRATORY (INHALATION) ONCE
Status: DISCONTINUED | OUTPATIENT
Start: 2024-09-24 | End: 2024-09-24 | Stop reason: HOSPADM

## 2024-09-24 RX ORDER — FLUMAZENIL 0.1 MG/ML
0.2 INJECTION, SOLUTION INTRAVENOUS
Status: DISCONTINUED | OUTPATIENT
Start: 2024-09-24 | End: 2024-09-24 | Stop reason: HOSPADM

## 2024-09-24 RX ORDER — NALOXONE HYDROCHLORIDE 0.4 MG/ML
0.4 INJECTION, SOLUTION INTRAMUSCULAR; INTRAVENOUS; SUBCUTANEOUS
Status: DISCONTINUED | OUTPATIENT
Start: 2024-09-24 | End: 2024-09-24 | Stop reason: HOSPADM

## 2024-09-24 RX ORDER — LIDOCAINE 40 MG/G
CREAM TOPICAL
Status: DISCONTINUED | OUTPATIENT
Start: 2024-09-24 | End: 2024-09-24 | Stop reason: HOSPADM

## 2024-09-24 RX ADMIN — PROPOFOL 50 MG: 10 INJECTION, EMULSION INTRAVENOUS at 12:55

## 2024-09-24 RX ADMIN — SODIUM CHLORIDE, POTASSIUM CHLORIDE, SODIUM LACTATE AND CALCIUM CHLORIDE: 600; 310; 30; 20 INJECTION, SOLUTION INTRAVENOUS at 12:45

## 2024-09-24 RX ADMIN — LIDOCAINE HYDROCHLORIDE 100 MG: 20 INJECTION, SOLUTION INFILTRATION; PERINEURAL at 12:46

## 2024-09-24 RX ADMIN — PROPOFOL 50 MG: 10 INJECTION, EMULSION INTRAVENOUS at 12:52

## 2024-09-24 RX ADMIN — LIDOCAINE HYDROCHLORIDE 0.1 ML: 10 INJECTION, SOLUTION EPIDURAL; INFILTRATION; INTRACAUDAL; PERINEURAL at 12:39

## 2024-09-24 RX ADMIN — SODIUM CHLORIDE, POTASSIUM CHLORIDE, SODIUM LACTATE AND CALCIUM CHLORIDE: 600; 310; 30; 20 INJECTION, SOLUTION INTRAVENOUS at 12:39

## 2024-09-24 RX ADMIN — PROPOFOL 50 MG: 10 INJECTION, EMULSION INTRAVENOUS at 12:54

## 2024-09-24 RX ADMIN — PROPOFOL 150 MG: 10 INJECTION, EMULSION INTRAVENOUS at 12:49

## 2024-09-24 ASSESSMENT — ACTIVITIES OF DAILY LIVING (ADL)
ADLS_ACUITY_SCORE: 37
ADLS_ACUITY_SCORE: 37

## 2024-09-24 NOTE — ANESTHESIA CARE TRANSFER NOTE
Patient: Madelyn Quintana    Procedure: Procedure(s):  ESOPHAGOGASTRODUODENOSCOPY, WITH BIOPSY       Diagnosis: Nausea and vomiting, unspecified vomiting type [R11.2]  Diagnosis Additional Information: No value filed.    Anesthesia Type:   General     Note:    Oropharynx: oropharynx clear of all foreign objects and spontaneously breathing  Level of Consciousness: drowsy  Oxygen Supplementation: room air    Independent Airway: airway patency satisfactory and stable  Dentition: dentition unchanged  Vital Signs Stable: post-procedure vital signs reviewed and stable  Report to RN Given: handoff report given  Patient transferred to: Phase II    Handoff Report: Identifed the Patient, Identified the Reponsible Provider, Reviewed the pertinent medical history, Discussed the surgical course, Reviewed Intra-OP anesthesia mangement and issues during anesthesia, Set expectations for post-procedure period and Allowed opportunity for questions and acknowledgement of understanding      Vitals:  Vitals Value Taken Time   /73 09/24/24 1300   Temp     Pulse 59 09/24/24 1300   Resp     SpO2 99 % 09/24/24 1303   Vitals shown include unfiled device data.    Electronically Signed By: NATHANAEL Hansen CRNA  September 24, 2024  1:04 PM

## 2024-09-24 NOTE — ANESTHESIA POSTPROCEDURE EVALUATION
Patient: Madelyn Quintana    Procedure: Procedure(s):  ESOPHAGOGASTRODUODENOSCOPY, WITH BIOPSY       Anesthesia Type:  General    Note:  Disposition: Outpatient   Postop Pain Control: Uneventful            Sign Out: Well controlled pain   PONV: No   Neuro/Psych: Uneventful            Sign Out: Acceptable/Baseline neuro status   Airway/Respiratory: Uneventful            Sign Out: Acceptable/Baseline resp. status   CV/Hemodynamics: Uneventful            Sign Out: Acceptable CV status; No obvious hypovolemia; No obvious fluid overload   Other NRE: NONE   DID A NON-ROUTINE EVENT OCCUR? No           Last vitals:  Vitals:    09/24/24 1212   BP: 102/58   Pulse: 78   Resp: 18   SpO2: 100%       Electronically Signed By: NATHANAEL Hansen CRNA  September 24, 2024  1:04 PM

## 2024-09-24 NOTE — LETTER
Madelyn Quintana  [address] [place] [address]  September 27, 2024    Dear Madelyn,   This letter is to inform you of the results of your pathology report on your upper endoscopy (EGD).    Your pathology report was:  Showed findings consistent with a mildly inflamed stomach. If you continue to have symptoms please follow up with your primary care doctor or with myself.    If you have any questions or concerns please do not hesitate to call my office at [phone].  Sincerely,     Norman Santillan MD  [facility]        Resulted Orders   Surgical Pathology Exam   Result Value Ref Range    Case Report       Surgical Pathology Report                         Case: [id number]                                  Authorizing Provider:  Norman Santillan MD       Collected:           09/24/2024 12:56 PM          Ordering Location:     [facility]   Received:            09/24/2024 01:14 PM                                 [place]                                                                      Pathologist:           Trip Hussein MD                                                          Specimen:    Stomach, Antrum, rule out H. Pylori                                                        Final Diagnosis       A. Stomach, antrum, biopsy  - Antral type gastric mucosa with mild chronic inflammation.  - Negative for H. Pylori organisms on routine stains.  - Negative for intestinal metaplasia.   -Negative for dysplasia or malignancy        Clinical Information       Procedure:  ESOPHAGOGASTRODUODENOSCOPY, WITH BIOPSY  Pre-op Diagnosis: Nausea and vomiting, unspecified vomiting type [R11.2]  Post-op Diagnosis: R11.2 - Nausea and vomiting, unspecified vomiting type [ICD-10-CM]      Gross Description       A(1). Stomach, Antrum, rule out H. Pylori:  The specimen is received in formalin, labeled with the patient's name, medical record number and other identifying information and designated   stomach, antrum . It consists of 2 tan soft tissue fragments ranging from 0.2-0.3 cm. Entirely submitted in one cassette.   (ANITA Harman (ASCP) 9/25/2024 8:16 AM       Microscopic Description       Microscopic examination was performed.        Performing Labs       The technical component of this testing was completed at [facility].    Stain controls for all stains resulted within this report have been reviewed and show appropriate reactivity.       Case Images

## 2024-09-26 LAB
PATH REPORT.COMMENTS IMP SPEC: NORMAL
PATH REPORT.COMMENTS IMP SPEC: NORMAL
PATH REPORT.FINAL DX SPEC: NORMAL
PATH REPORT.GROSS SPEC: NORMAL
PATH REPORT.MICROSCOPIC SPEC OTHER STN: NORMAL
PATH REPORT.RELEVANT HX SPEC: NORMAL
PHOTO IMAGE: NORMAL

## 2024-11-13 ENCOUNTER — VIRTUAL VISIT (OUTPATIENT)
Dept: FAMILY MEDICINE | Facility: CLINIC | Age: 29
End: 2024-11-13
Payer: COMMERCIAL

## 2024-11-13 DIAGNOSIS — E66.811 CLASS 1 OBESITY DUE TO EXCESS CALORIES WITH SERIOUS COMORBIDITY AND BODY MASS INDEX (BMI) OF 32.0 TO 32.9 IN ADULT: Primary | ICD-10-CM

## 2024-11-13 DIAGNOSIS — F33.1 MODERATE EPISODE OF RECURRENT MAJOR DEPRESSIVE DISORDER (H): ICD-10-CM

## 2024-11-13 DIAGNOSIS — E66.09 CLASS 1 OBESITY DUE TO EXCESS CALORIES WITH SERIOUS COMORBIDITY AND BODY MASS INDEX (BMI) OF 32.0 TO 32.9 IN ADULT: Primary | ICD-10-CM

## 2024-11-13 PROCEDURE — 99214 OFFICE O/P EST MOD 30 MIN: CPT | Mod: 95 | Performed by: PHYSICIAN ASSISTANT

## 2024-11-13 RX ORDER — SEMAGLUTIDE 0.5 MG/.5ML
0.5 INJECTION, SOLUTION SUBCUTANEOUS WEEKLY
Qty: 2 ML | Refills: 0 | Status: SHIPPED | OUTPATIENT
Start: 2024-12-11 | End: 2025-01-08

## 2024-11-13 RX ORDER — SEMAGLUTIDE 1 MG/.5ML
1 INJECTION, SOLUTION SUBCUTANEOUS WEEKLY
Qty: 6 ML | Refills: 3 | Status: SHIPPED | OUTPATIENT
Start: 2025-01-12

## 2024-11-13 RX ORDER — SEMAGLUTIDE 0.25 MG/.5ML
0.25 INJECTION, SOLUTION SUBCUTANEOUS WEEKLY
Qty: 2 ML | Refills: 0 | Status: SHIPPED | OUTPATIENT
Start: 2024-11-13 | End: 2024-12-11

## 2024-11-13 ASSESSMENT — ASTHMA QUESTIONNAIRES: ACT_TOTALSCORE: 23

## 2024-11-13 ASSESSMENT — PATIENT HEALTH QUESTIONNAIRE - PHQ9
SUM OF ALL RESPONSES TO PHQ QUESTIONS 1-9: 5
SUM OF ALL RESPONSES TO PHQ QUESTIONS 1-9: 5
10. IF YOU CHECKED OFF ANY PROBLEMS, HOW DIFFICULT HAVE THESE PROBLEMS MADE IT FOR YOU TO DO YOUR WORK, TAKE CARE OF THINGS AT HOME, OR GET ALONG WITH OTHER PEOPLE: SOMEWHAT DIFFICULT

## 2024-11-13 NOTE — PROGRESS NOTES
Madelyn is a 29 year old who is being evaluated via a billable video visit.    How would you like to obtain your AVS? MyChart  If the video visit is dropped, the invitation should be resent by: Text to cell phone: [phone]  Will anyone else be joining your video visit? No      Assessment & Plan   Class 1 obesity due to excess calories with serious comorbidity and body mass index (BMI) of 32.0 to 32.9 in adult  Moderate episode of recurrent major depressive disorder (H)  Initially had great weight loss with Wegovy, but this was stopped for the last six months due to some GI symptoms, but is is unclear if it was a side effect of Wegovy or not. She would like to restart this as she has regained about 15-20 lbs already being off of it. Will monitor closely as she restarts this. Will follow-up in 3 months for recheck, sooner if needed.   - Semaglutide-Weight Management (WEGOVY) 0.25 MG/0.5ML pen; Inject 0.25 mg subcutaneously once a week for 28 days.  - Semaglutide-Weight Management (WEGOVY) 0.5 MG/0.5ML pen; Inject 0.5 mg subcutaneously once a week for 28 days.  - Semaglutide-Weight Management (WEGOVY) 1 MG/0.5ML pen; Inject 1 mg subcutaneously once a week.    The longitudinal plan of care for the diagnosis(es)/condition(s) as documented were addressed during this visit. Due to the added complexity in care, I will continue to support Madelyn in the subsequent management and with ongoing continuity of care.     Subjective   Madelyn is a 29 year old, presenting for the following health issues:  Weight Loss        11/13/2024     7:08 AM   Additional Questions   Roomed by Feli AGUILA   Accompanied by none         11/13/2024     7:08 AM   Patient Reported Additional Medications   Patient reports taking the following new medications none     History of Present Illness       Reason for visit:  Weight    She eats 4 or more servings of fruits and vegetables daily.She consumes 0 sweetened beverage(s) daily.She exercises with  "enough effort to increase her heart rate 30 to 60 minutes per day.  She exercises with enough effort to increase her heart rate 5 days per week.   She is taking medications regularly.     Review of Systems  See HPI       Objective    Vitals - Patient Reported  Weight (Patient Reported): 64.4 kg (142 lb)  Height (Patient Reported): 157.5 cm (5' 2\")  BMI (Based on Pt Reported Ht/Wt): 25.97  Pain Score: No Pain (0)      Vitals:  No vitals were obtained today due to virtual visit.    Physical Exam   GENERAL: alert and no distress  EYES: Eyes grossly normal to inspection.  No discharge or erythema, or obvious scleral/conjunctival abnormalities.  RESP: No audible wheeze, cough, or visible cyanosis.    SKIN: Visible skin clear. No significant rash, abnormal pigmentation or lesions.  NEURO: Cranial nerves grossly intact.  Mentation and speech appropriate for age.  PSYCH: Appropriate affect, tone, and pace of words        Video-Visit Details    Type of service:  Video Visit   Originating Location (pt. Location): Home    Distant Location (provider location):  On-site  Platform used for Video Visit: Ana  Signed Electronically by: Roman Luevano PA-C    "

## 2024-12-11 ENCOUNTER — MYC MEDICAL ADVICE (OUTPATIENT)
Dept: FAMILY MEDICINE | Facility: CLINIC | Age: 29
End: 2024-12-11
Payer: COMMERCIAL

## 2024-12-11 DIAGNOSIS — E66.09 CLASS 1 OBESITY DUE TO EXCESS CALORIES WITH SERIOUS COMORBIDITY AND BODY MASS INDEX (BMI) OF 32.0 TO 32.9 IN ADULT: ICD-10-CM

## 2024-12-11 DIAGNOSIS — F33.1 MODERATE EPISODE OF RECURRENT MAJOR DEPRESSIVE DISORDER (H): ICD-10-CM

## 2024-12-11 DIAGNOSIS — E66.811 CLASS 1 OBESITY DUE TO EXCESS CALORIES WITH SERIOUS COMORBIDITY AND BODY MASS INDEX (BMI) OF 32.0 TO 32.9 IN ADULT: ICD-10-CM

## 2024-12-11 RX ORDER — SEMAGLUTIDE 0.25 MG/.5ML
INJECTION, SOLUTION SUBCUTANEOUS
Qty: 2 ML | Refills: 0 | OUTPATIENT
Start: 2024-12-11

## 2024-12-11 NOTE — TELEPHONE ENCOUNTER
Routing to provider for med refill  See Livan.    Sofi SUE RN  [facility]

## 2025-02-05 ENCOUNTER — OFFICE VISIT (OUTPATIENT)
Dept: FAMILY MEDICINE | Facility: CLINIC | Age: 30
End: 2025-02-05
Payer: COMMERCIAL

## 2025-02-05 VITALS
WEIGHT: 132 LBS | TEMPERATURE: 98.6 F | HEIGHT: 63 IN | SYSTOLIC BLOOD PRESSURE: 112 MMHG | BODY MASS INDEX: 23.39 KG/M2 | DIASTOLIC BLOOD PRESSURE: 68 MMHG | OXYGEN SATURATION: 99 % | HEART RATE: 74 BPM

## 2025-02-05 DIAGNOSIS — F40.01 PANIC DISORDER WITH AGORAPHOBIA: ICD-10-CM

## 2025-02-05 DIAGNOSIS — F33.1 MODERATE EPISODE OF RECURRENT MAJOR DEPRESSIVE DISORDER (H): ICD-10-CM

## 2025-02-05 DIAGNOSIS — E66.811 CLASS 1 OBESITY DUE TO EXCESS CALORIES WITH SERIOUS COMORBIDITY AND BODY MASS INDEX (BMI) OF 32.0 TO 32.9 IN ADULT: Primary | ICD-10-CM

## 2025-02-05 DIAGNOSIS — H61.23 BILATERAL IMPACTED CERUMEN: ICD-10-CM

## 2025-02-05 DIAGNOSIS — E66.09 CLASS 1 OBESITY DUE TO EXCESS CALORIES WITH SERIOUS COMORBIDITY AND BODY MASS INDEX (BMI) OF 32.0 TO 32.9 IN ADULT: Primary | ICD-10-CM

## 2025-02-05 PROCEDURE — 99214 OFFICE O/P EST MOD 30 MIN: CPT | Mod: 25 | Performed by: PHYSICIAN ASSISTANT

## 2025-02-05 PROCEDURE — 69209 REMOVE IMPACTED EAR WAX UNI: CPT | Mod: 50 | Performed by: PHYSICIAN ASSISTANT

## 2025-02-05 ASSESSMENT — PAIN SCALES - GENERAL: PAINLEVEL_OUTOF10: NO PAIN (0)

## 2025-02-05 NOTE — PROGRESS NOTES
Assessment & Plan   Class 1 obesity due to excess calories with serious comorbidity and body mass index (BMI) of 32.0 to 32.9 in adult  Doing well on Wegovy. Associated with depression which would improve with weight loss. Encouraged healthy lifestyle changes.     Bilateral impacted cerumen  Bilateral impaction. After lavage, canals are clear without debris.   - REMOVE IMPACTED CERUMEN    Moderate episode of recurrent major depressive disorder (H)  Panic disorder with agoraphobia  Has been seeing Psychiatry for years. On stable regimen of Zoloft, Trazodone and a few tablets of Ativan per year. I am comfortable managing this regimen and the patient can obtain refills from me when needed.     The longitudinal plan of care for the diagnosis(es)/condition(s) as documented were addressed during this visit. Due to the added complexity in care, I will continue to support Madelyn in the subsequent management and with ongoing continuity of care    Subjective   Madelyn is a 29 year old, presenting for the following health issues:  Weight Loss, Recheck Medication, and Ear Problem (Patient has concerns with bilateral Ears. Patient reports an hour after she showers it is bothersome feeling full and ringing. )        2/5/2025     3:19 PM   Additional Questions   Roomed by Jen Orozco CMA   Accompanied by Self     History of Present Illness       Reason for visit:  Weight management    She eats 4 or more servings of fruits and vegetables daily.She consumes 0 sweetened beverage(s) daily.She exercises with enough effort to increase her heart rate 30 to 60 minutes per day.  She exercises with enough effort to increase her heart rate 4 days per week.   She is taking medications regularly.     Concern -  Bilateral Ear concerns   Onset: months   Description: Patient reports fullness in bilateral ears after showers only   Intensity: mild  Progression of Symptoms:  same  Accompanying Signs & Symptoms:  bilateral Ears fullness and  "ringing   Previous history of similar problem: yes   Precipitating factors:        Worsened by: showers   Alleviating factors:        Improved by: Patient reports better after a few hours after a shower   Therapies tried and outcome: None    ROS:  See HPI       Objective    /68 (BP Location: Right arm, Patient Position: Sitting, Cuff Size: Adult Regular)   Pulse 74   Temp 98.6  F (37  C) (Tympanic)   Ht 1.595 m (5' 2.8\")   Wt 59.9 kg (132 lb)   LMP 01/25/2025   SpO2 99%   Breastfeeding No   BMI 23.54 kg/m    Body mass index is 23.54 kg/m .  Physical Exam   Constitutional: healthy, alert, and no distress  Head: Normocephalic. Atraumatic  Eyes: No conjunctival injection, sclera anicteric  Ears: Occlusion of the R ear due to cerumen. Left ear canal is partially occluded with cerumen. After lavage, canals are clear without debris.   Respiratory: No resp distress.  Musculoskeletal: extremities normal- no gross deformities noted, and normal muscle tone  Neurologic: Gait normal. CN 2-12 grossly intact  Psychiatric: mentation appears normal and affect normal/bright           Signed Electronically by: Roman Luevano PA-C    "

## 2025-02-11 RX ORDER — SEMAGLUTIDE 0.5 MG/.5ML
INJECTION, SOLUTION SUBCUTANEOUS
Qty: 2 ML | Refills: 0 | OUTPATIENT
Start: 2025-02-11

## 2025-03-20 DIAGNOSIS — J45.20 MILD INTERMITTENT REACTIVE AIRWAY DISEASE WITHOUT COMPLICATION: ICD-10-CM

## 2025-03-20 RX ORDER — ALBUTEROL SULFATE 90 UG/1
INHALANT RESPIRATORY (INHALATION)
Qty: 18 G | Refills: 1 | Status: SHIPPED | OUTPATIENT
Start: 2025-03-20

## 2025-03-20 NOTE — TELEPHONE ENCOUNTER
Pending Prescriptions:                       Disp   Refills    albuterol (VENTOLIN HFA) 108 (90 Base) MC*18 g   1            Sig: INHALE 2 PUFFS INTO THE LUNGS EVERY 6 HOURS AS           NEEDED FOR SHORTNESS OF BREATH, DIFFICULTY           BREATHING OR WHEEZING.    Last office visit: 2/5/2025 ; last virtual visit: 11/13/2024 with prescribing provider:        Ziggy btut

## 2025-08-14 ENCOUNTER — PATIENT OUTREACH (OUTPATIENT)
Dept: CARE COORDINATION | Facility: CLINIC | Age: 30
End: 2025-08-14

## (undated) DEVICE — ENDO FORCEP ENDOJAW BIOPSY 2.8MMX230CM FB-220U

## (undated) RX ORDER — LIDOCAINE HYDROCHLORIDE 10 MG/ML
INJECTION, SOLUTION EPIDURAL; INFILTRATION; INTRACAUDAL; PERINEURAL
Status: DISPENSED
Start: 2024-09-24